# Patient Record
Sex: FEMALE | Race: WHITE | Employment: FULL TIME | URBAN - METROPOLITAN AREA
[De-identification: names, ages, dates, MRNs, and addresses within clinical notes are randomized per-mention and may not be internally consistent; named-entity substitution may affect disease eponyms.]

---
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
## 2021-06-08 ENCOUNTER — HOSPITAL ENCOUNTER (EMERGENCY)
Age: 21
Discharge: HOME | End: 2021-06-08
Attending: STUDENT IN AN ORGANIZED HEALTH CARE EDUCATION/TRAINING PROGRAM

## 2021-06-08 VITALS
SYSTOLIC BLOOD PRESSURE: 99 MMHG | OXYGEN SATURATION: 98 % | RESPIRATION RATE: 16 BRPM | WEIGHT: 165 LBS | HEIGHT: 69 IN | BODY MASS INDEX: 24.44 KG/M2 | HEART RATE: 70 BPM | TEMPERATURE: 98.4 F | DIASTOLIC BLOOD PRESSURE: 66 MMHG

## 2021-06-08 DIAGNOSIS — N76.0 BACTERIAL VAGINOSIS: Primary | ICD-10-CM

## 2021-06-08 DIAGNOSIS — B96.89 BACTERIAL VAGINOSIS: Primary | ICD-10-CM

## 2021-06-08 LAB
BILIRUBIN URINE: NEGATIVE
BLOOD, URINE: NEGATIVE
CLARITY: CLEAR
CLUE CELLS: ABNORMAL
COLOR: YELLOW
GLUCOSE URINE: NEGATIVE MG/DL
HCG, URINE, POC: NEGATIVE
KETONES, URINE: ABNORMAL MG/DL
LEUKOCYTE ESTERASE, URINE: NEGATIVE
Lab: NORMAL
NEGATIVE QC PASS/FAIL: NORMAL
NITRITE, URINE: NEGATIVE
PH UA: 6 (ref 5–9)
POSITIVE QC PASS/FAIL: NORMAL
PROTEIN UA: NEGATIVE MG/DL
SPECIFIC GRAVITY UA: 1.03 (ref 1–1.03)
TRICHOMONAS PREP: ABNORMAL
TRICHOMONAS VAGINALIS SCREEN: NEGATIVE
URINE REFLEX TO CULTURE: ABNORMAL
UROBILINOGEN, URINE: 1 E.U./DL
YEAST WET PREP: ABNORMAL

## 2021-06-08 PROCEDURE — 87591 N.GONORRHOEAE DNA AMP PROB: CPT

## 2021-06-08 PROCEDURE — 6370000000 HC RX 637 (ALT 250 FOR IP): Performed by: STUDENT IN AN ORGANIZED HEALTH CARE EDUCATION/TRAINING PROGRAM

## 2021-06-08 PROCEDURE — 87808 TRICHOMONAS ASSAY W/OPTIC: CPT

## 2021-06-08 PROCEDURE — 99284 EMERGENCY DEPT VISIT MOD MDM: CPT

## 2021-06-08 PROCEDURE — 81003 URINALYSIS AUTO W/O SCOPE: CPT

## 2021-06-08 PROCEDURE — 87491 CHLMYD TRACH DNA AMP PROBE: CPT

## 2021-06-08 PROCEDURE — 87210 SMEAR WET MOUNT SALINE/INK: CPT

## 2021-06-08 RX ORDER — NAPROXEN 500 MG/1
500 TABLET ORAL 2 TIMES DAILY
Qty: 20 TABLET | Refills: 0 | Status: SHIPPED | OUTPATIENT
Start: 2021-06-08 | End: 2021-06-18

## 2021-06-08 RX ORDER — DICYCLOMINE HYDROCHLORIDE 10 MG/1
10 CAPSULE ORAL EVERY 6 HOURS PRN
Qty: 12 CAPSULE | Refills: 0 | Status: SHIPPED | OUTPATIENT
Start: 2021-06-08 | End: 2021-06-11

## 2021-06-08 RX ORDER — METRONIDAZOLE 500 MG/1
2000 TABLET ORAL ONCE
Status: COMPLETED | OUTPATIENT
Start: 2021-06-08 | End: 2021-06-08

## 2021-06-08 RX ADMIN — METRONIDAZOLE 2000 MG: 500 TABLET ORAL at 11:50

## 2021-06-08 ASSESSMENT — ENCOUNTER SYMPTOMS
CHEST TIGHTNESS: 0
TROUBLE SWALLOWING: 0
SHORTNESS OF BREATH: 0
VOMITING: 0
BACK PAIN: 0
SINUS PRESSURE: 0
ABDOMINAL PAIN: 1
DIARRHEA: 0
COUGH: 0

## 2021-06-08 ASSESSMENT — PAIN SCALES - GENERAL: PAINLEVEL_OUTOF10: 6

## 2021-06-08 ASSESSMENT — PAIN DESCRIPTION - LOCATION: LOCATION: ABDOMEN

## 2021-06-08 NOTE — ED TRIAGE NOTES
Pt comes to er with  C/o lower  Abdominal pain x 4 days. Denies  N/v/d.  Pt states she  Has no pain with urination but  She is urinating less

## 2021-06-08 NOTE — ED PROVIDER NOTES
[address] [facility] ED  eMERGENCY dEPARTMENT eNCOUnter      Pt Name: Candelaria Membreno  MRN: [id number]  [place] 2000  Date of evaluation: 6/8/2021  Provider: Mar Gama, [address] [place] [address]       Chief Complaint   Patient presents with    Abdominal Pain     x 4 days. denies n/v/d         HISTORY OF PRESENT ILLNESS   (Location/Symptom, Timing/Onset,Context/Setting, Quality, Duration, Modifying Factors, Severity)  Note limiting factors. Candelaria Membreno is a 23 y.o. female who presents to the emergency department with c/o prepubic abdominal pain. Patient is worried that she may have a urinary tract infection. Patient denies any nausea, vomiting or diarrhea. Patient denies any vaginal discharge or vaginal bleeding. Patient's male fiancé accompanies the patient. I have her permission to interview, examined her, discussed her care with him present. The history is provided by the patient and a significant other. NursingNotes were reviewed. REVIEW OF SYSTEMS    (2-9 systems for level 4, 10 or more for level 5)     Review of Systems   Constitutional: Negative for activity change, appetite change, chills, fever and unexpected weight change. HENT: Negative for drooling, ear pain, nosebleeds, sinus pressure and trouble swallowing. Respiratory: Negative for cough, chest tightness and shortness of breath. Cardiovascular: Negative for chest pain and leg swelling. Gastrointestinal: Positive for abdominal pain. Negative for diarrhea and vomiting. Endocrine: Negative for polydipsia and polyphagia. Genitourinary: Negative for dysuria, flank pain and frequency. Musculoskeletal: Negative for back pain and myalgias. Skin: Negative for pallor and rash. Neurological: Negative for syncope, weakness and headaches. Hematological: Does not bruise/bleed easily. All other systems reviewed and are negative.       Except as noted above the remainder of the review of systems was reviewed and negative. PAST MEDICAL HISTORY   History reviewed. No pertinent past medical history. SURGICALHISTORY     History reviewed. No pertinent surgical history. CURRENT MEDICATIONS       Discharge Medication List as of 6/8/2021 11:22 AM          ALLERGIES     Patient has no known allergies. FAMILY HISTORY     History reviewed. No pertinent family history. SOCIAL HISTORY       Social History     Socioeconomic History    Marital status: Single     Spouse name: None    Number of children: None    Years of education: None    Highest education level: None   Occupational History    None   Tobacco Use    Smoking status: Never Smoker    Smokeless tobacco: Never Used   Substance and Sexual Activity    Alcohol use: No    Drug use: No    Sexual activity: None   Other Topics Concern    None   Social History Narrative    None     Social Determinants of Health     Financial Resource Strain:     Difficulty of Paying Living Expenses:    Food Insecurity:     Worried About Running Out of Food in the Last Year:     Ran Out of Food in the Last Year:    Transportation Needs:     Lack of Transportation (Medical):      Lack of Transportation (Non-Medical):    Physical Activity:     Days of Exercise per Week:     Minutes of Exercise per Session:    Stress:     Feeling of Stress :    Social Connections:     Frequency of Communication with Friends and Family:     Frequency of Social Gatherings with Friends and Family:     Attends Anabaptism Services:     Active Member of Clubs or Organizations:     Attends Club or Organization Meetings:     Marital Status:    Intimate Partner Violence:     Fear of Current or Ex-Partner:     Emotionally Abused:     Physically Abused:     Sexually Abused:        SCREENINGS    [place] Coma Scale  Eye Opening: Spontaneous  Best Verbal Response: Oriented  Best Motor Response: Obeys commands  Christofer Coma Scale Score: 15 @FLOW([id number])@      PHYSICAL EXAM    (up to 7 for level 4, 8 or more for level 5)     ED Triage Vitals [06/08/21 0818]   BP Temp Temp Source Pulse Resp SpO2 Height Weight   115/70 98.4 °F (36.9 °C) Oral 73 16 99 % 5' 9\" (1.753 m) 165 lb (74.8 kg)       Physical Exam  Vitals and nursing note reviewed. Exam conducted with a chaperone present. Constitutional:       General: She is awake. She is not in acute distress. Appearance: Normal appearance. She is well-developed and normal weight. She is not ill-appearing, toxic-appearing or diaphoretic. Comments: No photophobia. No phonophobia. HENT:      Head: Normocephalic and atraumatic. No Henriquez's sign. Right Ear: External ear normal.      Left Ear: External ear normal.      Nose: Nose normal. No congestion or rhinorrhea. Mouth/Throat:      Mouth: Mucous membranes are moist.      Pharynx: Oropharynx is clear. No oropharyngeal exudate or posterior oropharyngeal erythema. Eyes:      General: No scleral icterus. Right eye: No foreign body or discharge. Left eye: No discharge. Extraocular Movements: Extraocular movements intact. Conjunctiva/sclera: Conjunctivae normal.      Left eye: No exudate. Pupils: Pupils are equal, round, and reactive to light. Neck:      Vascular: No JVD. Trachea: No tracheal deviation. Comments: No meningismus. Cardiovascular:      Rate and Rhythm: Normal rate and regular rhythm. Heart sounds: Normal heart sounds. Heart sounds not distant. No murmur heard. No friction rub. No gallop. Pulmonary:      Effort: Pulmonary effort is normal. No respiratory distress. Breath sounds: Normal breath sounds. No stridor. No wheezing, rhonchi or rales. Chest:      Chest wall: No tenderness. Abdominal:      General: Abdomen is flat. Bowel sounds are normal. There is no distension or abdominal bruit. There are no signs of injury. Palpations: Abdomen is soft.  There is no shifting dullness, fluid wave, hepatomegaly, splenomegaly, mass or pulsatile mass. Tenderness: There is abdominal tenderness in the suprapubic area. There is no right CVA tenderness, left CVA tenderness, guarding or rebound. Negative signs include Rucker's sign, Rovsing's sign and McBurney's sign. Hernia: No hernia is present. Genitourinary:     Exam position: Knee-chest position. Vagina: Normal.      Cervix: Discharge present. No erythema or cervical bleeding. Uterus: Normal.       Adnexa: Right adnexa normal and left adnexa normal.   Musculoskeletal:         General: No swelling, tenderness, deformity or signs of injury. Normal range of motion. Cervical back: Normal range of motion and neck supple. No rigidity. Lymphadenopathy:      Head:      Right side of head: No submental adenopathy. Left side of head: No submental adenopathy. Skin:     General: Skin is warm and dry. Capillary Refill: Capillary refill takes less than 2 seconds. Coloration: Skin is not jaundiced or pale. Findings: No bruising, erythema, lesion or rash. Neurological:      General: No focal deficit present. Mental Status: She is alert and oriented to person, place, and time. Mental status is at baseline. Cranial Nerves: No cranial nerve deficit. Sensory: No sensory deficit. Motor: No weakness. Coordination: Coordination normal.      Deep Tendon Reflexes: Reflexes are normal and symmetric. Psychiatric:         Mood and Affect: Mood normal.         Behavior: Behavior normal. Behavior is cooperative. Thought Content:  Thought content normal.         Judgment: Judgment normal.         DIAGNOSTIC RESULTS     EKG: All EKG's are interpreted by the Emergency Department Physician who either signs or Co-signsthis chart in the absence of a cardiologist.        RADIOLOGY:   Non-plain filmimages such as CT, Ultrasound and MRI are read by the radiologist. Plain radiographic images are visualized and preliminarily interpreted by the emergency physician with the below findings:        Interpretation per the Radiologist below, if available at the time ofthis note:    No orders to display         ED BEDSIDE ULTRASOUND:   Performed by ED Physician - none    LABS:  Labs Reviewed   WET PREP, GENITAL - Abnormal; Notable for the following components:       Result Value    Clue Cells, Wet Prep 1+ (*)     All other components within normal limits   URINE RT REFLEX TO CULTURE - Abnormal; Notable for the following components:    Ketones, Urine TRACE (*)     All other components within normal limits   C.TRACHOMATIS N.GONORRHOEAE DNA, URINE   TRICHOMONAS BY EIA   POC PREGNANCY UR-QUAL       All other labs were within normal range or not returned as of this dictation. EMERGENCY DEPARTMENT COURSE and DIFFERENTIAL DIAGNOSIS/MDM:   Vitals:    Vitals:    06/08/21 0818 06/08/21 1154   BP: 115/70 99/66   Pulse: 73 70   Resp: 16 16   Temp: 98.4 °F (36.9 °C)    TempSrc: Oral    SpO2: 99% 98%   Weight: 165 lb (74.8 kg)    Height: 5' 9\" (1.753 m)            MDM  Patient has suprapubic abdominal pain. Urinalysis shows no urinary tract infection. Pelvic exam was done which shows vaginal discharge. Wet prep was positive for bacterial vaginosis. Patient states she follows regularly with OB/GYN. Patient was treated with 2 g of Flagyl. Patient informed that the Flagyl may neutralize her birth control and the need to use another protective method such as condoms in order to avoid pregnancy for the next 1 month. The patient verbalized understanding of care discussed with her she has no further questions. The findings were discussed with the patient. The patient was invited to return  to the ER if worse symptoms. The patient verbalized understanding of the care and they have no further questions. CONSULTS:  None    PROCEDURES:  Unless otherwise noted below, none     Procedures    FINAL IMPRESSION      1.  Bacterial vaginosis          DISPOSITION/PLAN   DISPOSITION Decision To Discharge 06/08/2021 12:03:32 PM      PATIENT REFERRED TO:  [facility]  [phone] [facility] [id number]  [phone]  Call in 1 day        DISCHARGE MEDICATIONS:  Discharge Medication List as of 6/8/2021 11:22 AM      START taking these medications    Details   naproxen (NAPROSYN) 500 MG tablet Take 1 tablet by mouth 2 times daily for 20 doses, Disp-20 tablet, R-0Print      dicyclomine (BENTYL) 10 MG capsule Take 1 capsule by mouth every 6 hours as needed (cramps), Disp-12 capsule, R-0Print                (Please note that portions of this note were completed with a voice recognition program.  Efforts were made to edit the dictations but occasionally words are mis-transcribed.)    Ирина Lopez DO (electronically signed)  Attending Emergency Physician          Ирина Lopez DO  06/08/21 1942

## 2021-06-11 LAB
C. TRACHOMATIS DNA ,URINE: NEGATIVE
N. GONORRHOEAE DNA, URINE: NEGATIVE

## 2025-01-15 ENCOUNTER — TRANSCRIBE ORDERS (OUTPATIENT)
Dept: ADMINISTRATIVE | Age: 25
End: 2025-01-15

## 2025-01-15 DIAGNOSIS — Z34.02 ENCOUNTER FOR SUPERVISION OF NORMAL FIRST PREGNANCY IN SECOND TRIMESTER: Primary | ICD-10-CM

## 2025-01-16 ENCOUNTER — HOSPITAL ENCOUNTER (OUTPATIENT)
Dept: ULTRASOUND IMAGING | Age: 25
Discharge: HOME | End: 2025-01-18
Payer: COMMERCIAL

## 2025-01-16 DIAGNOSIS — Z34.02 ENCOUNTER FOR SUPERVISION OF NORMAL FIRST PREGNANCY IN SECOND TRIMESTER: ICD-10-CM

## 2025-01-16 PROCEDURE — 76817 TRANSVAGINAL US OBSTETRIC: CPT

## 2025-01-16 PROCEDURE — 76805 OB US >/= 14 WKS SNGL FETUS: CPT

## 2025-01-22 ENCOUNTER — CLINICAL SUPPORT (OUTPATIENT)
Dept: GENETICS | Facility: CLINIC | Age: 25
End: 2025-01-22
Payer: COMMERCIAL

## 2025-01-22 VITALS
SYSTOLIC BLOOD PRESSURE: 123 MMHG | BODY MASS INDEX: 25.77 KG/M2 | DIASTOLIC BLOOD PRESSURE: 74 MMHG | HEIGHT: 69 IN | HEART RATE: 91 BPM | WEIGHT: 174 LBS

## 2025-01-22 DIAGNOSIS — O28.3 ABNORMAL FETAL ULTRASOUND: ICD-10-CM

## 2025-01-22 ASSESSMENT — PAIN SCALES - GENERAL: PAINLEVEL_OUTOF10: 0-NO PAIN

## 2025-01-24 ENCOUNTER — APPOINTMENT (OUTPATIENT)
Dept: GENETICS | Facility: CLINIC | Age: 25
End: 2025-01-24
Payer: COMMERCIAL

## 2025-01-24 ENCOUNTER — HOSPITAL ENCOUNTER (OUTPATIENT)
Dept: RADIOLOGY | Facility: HOSPITAL | Age: 25
Discharge: HOME | End: 2025-01-24
Payer: COMMERCIAL

## 2025-01-24 ENCOUNTER — HOSPITAL ENCOUNTER (OUTPATIENT)
Facility: HOSPITAL | Age: 25
Discharge: HOME | End: 2025-01-24
Attending: STUDENT IN AN ORGANIZED HEALTH CARE EDUCATION/TRAINING PROGRAM | Admitting: STUDENT IN AN ORGANIZED HEALTH CARE EDUCATION/TRAINING PROGRAM
Payer: COMMERCIAL

## 2025-01-24 ENCOUNTER — HOSPITAL ENCOUNTER (OUTPATIENT)
Facility: HOSPITAL | Age: 25
End: 2025-01-24
Attending: STUDENT IN AN ORGANIZED HEALTH CARE EDUCATION/TRAINING PROGRAM | Admitting: STUDENT IN AN ORGANIZED HEALTH CARE EDUCATION/TRAINING PROGRAM
Payer: COMMERCIAL

## 2025-01-24 VITALS
HEART RATE: 73 BPM | OXYGEN SATURATION: 98 % | DIASTOLIC BLOOD PRESSURE: 58 MMHG | TEMPERATURE: 97.3 F | SYSTOLIC BLOOD PRESSURE: 123 MMHG | RESPIRATION RATE: 18 BRPM

## 2025-01-24 DIAGNOSIS — O41.02X1: ICD-10-CM

## 2025-01-24 PROBLEM — O41.02X0: Status: ACTIVE | Noted: 2025-01-24

## 2025-01-24 LAB
ABO GROUP (TYPE) IN BLOOD: NORMAL
ALBUMIN SERPL BCP-MCNC: 3.6 G/DL (ref 3.4–5)
ALP SERPL-CCNC: 54 U/L (ref 33–110)
ALT SERPL W P-5'-P-CCNC: 12 U/L (ref 7–45)
ANION GAP SERPL CALC-SCNC: 13 MMOL/L (ref 10–20)
ANTIBODY SCREEN: NORMAL
AST SERPL W P-5'-P-CCNC: 16 U/L (ref 9–39)
BILIRUB SERPL-MCNC: 0.3 MG/DL (ref 0–1.2)
BUN SERPL-MCNC: 8 MG/DL (ref 6–23)
C TRACH RRNA SPEC QL NAA+PROBE: NEGATIVE
CALCIUM SERPL-MCNC: 8.5 MG/DL (ref 8.6–10.6)
CHLORIDE SERPL-SCNC: 106 MMOL/L (ref 98–107)
CLUE CELLS SPEC QL WET PREP: NORMAL
CO2 SERPL-SCNC: 22 MMOL/L (ref 21–32)
CREAT SERPL-MCNC: 0.45 MG/DL (ref 0.5–1.05)
EGFRCR SERPLBLD CKD-EPI 2021: >90 ML/MIN/1.73M*2
ERYTHROCYTE [DISTWIDTH] IN BLOOD BY AUTOMATED COUNT: 12.8 % (ref 11.5–14.5)
GLUCOSE SERPL-MCNC: 78 MG/DL (ref 74–99)
HBV SURFACE AG SERPL QL IA: NONREACTIVE
HCT VFR BLD AUTO: 32.5 % (ref 36–46)
HCV AB SER QL: NONREACTIVE
HGB BLD-MCNC: 11 G/DL (ref 12–16)
HIV 1+2 AB+HIV1 P24 AG SERPL QL IA: NONREACTIVE
MCH RBC QN AUTO: 30.3 PG (ref 26–34)
MCHC RBC AUTO-ENTMCNC: 33.8 G/DL (ref 32–36)
MCV RBC AUTO: 90 FL (ref 80–100)
N GONORRHOEA DNA SPEC QL PROBE+SIG AMP: NEGATIVE
NRBC BLD-RTO: 0 /100 WBCS (ref 0–0)
PLATELET # BLD AUTO: 196 X10*3/UL (ref 150–450)
POC APPEARANCE, URINE: CLEAR
POC BILIRUBIN, URINE: NEGATIVE
POC BLOOD, URINE: NEGATIVE
POC COLOR, URINE: YELLOW
POC GLUCOSE, URINE: NEGATIVE MG/DL
POC KETONES, URINE: NEGATIVE MG/DL
POC LEUKOCYTES, URINE: NEGATIVE
POC NITRITE,URINE: NEGATIVE
POC PH, URINE: 7 PH
POC PROTEIN, URINE: NEGATIVE MG/DL
POC SPECIFIC GRAVITY, URINE: 1.02
POC UROBILINOGEN, URINE: 0.2 EU/DL
POTASSIUM SERPL-SCNC: 3.9 MMOL/L (ref 3.5–5.3)
PROT SERPL-MCNC: 6.1 G/DL (ref 6.4–8.2)
RBC # BLD AUTO: 3.63 X10*6/UL (ref 4–5.2)
REFLEX ADDED, ANEMIA PANEL: NORMAL
RH FACTOR (ANTIGEN D): NORMAL
RUBV IGG SERPL IA-ACNC: 2.4 IA
RUBV IGG SERPL QL IA: POSITIVE
SODIUM SERPL-SCNC: 137 MMOL/L (ref 136–145)
T VAGINALIS RRNA SPEC QL NAA+PROBE: NEGATIVE
T VAGINALIS SPEC QL WET PREP: NORMAL
TREPONEMA PALLIDUM IGG+IGM AB [PRESENCE] IN SERUM OR PLASMA BY IMMUNOASSAY: NONREACTIVE
WBC # BLD AUTO: 10 X10*3/UL (ref 4.4–11.3)
WBC VAG QL WET PREP: >50
YEAST VAG QL WET PREP: NORMAL

## 2025-01-24 PROCEDURE — 87491 CHLMYD TRACH DNA AMP PROBE: CPT | Performed by: NURSE PRACTITIONER

## 2025-01-24 PROCEDURE — 76811 OB US DETAILED SNGL FETUS: CPT

## 2025-01-24 PROCEDURE — 87661 TRICHOMONAS VAGINALIS AMPLIF: CPT | Performed by: NURSE PRACTITIONER

## 2025-01-24 PROCEDURE — 36415 COLL VENOUS BLD VENIPUNCTURE: CPT

## 2025-01-24 PROCEDURE — 80053 COMPREHEN METABOLIC PANEL: CPT | Performed by: NURSE PRACTITIONER

## 2025-01-24 PROCEDURE — 85027 COMPLETE CBC AUTOMATED: CPT | Performed by: NURSE PRACTITIONER

## 2025-01-24 PROCEDURE — 87210 SMEAR WET MOUNT SALINE/INK: CPT | Performed by: NURSE PRACTITIONER

## 2025-01-24 PROCEDURE — 86803 HEPATITIS C AB TEST: CPT | Performed by: NURSE PRACTITIONER

## 2025-01-24 PROCEDURE — 87340 HEPATITIS B SURFACE AG IA: CPT | Performed by: NURSE PRACTITIONER

## 2025-01-24 PROCEDURE — 86780 TREPONEMA PALLIDUM: CPT | Performed by: NURSE PRACTITIONER

## 2025-01-24 PROCEDURE — 86901 BLOOD TYPING SEROLOGIC RH(D): CPT | Performed by: NURSE PRACTITIONER

## 2025-01-24 PROCEDURE — 86317 IMMUNOASSAY INFECTIOUS AGENT: CPT | Performed by: NURSE PRACTITIONER

## 2025-01-24 PROCEDURE — 99214 OFFICE O/P EST MOD 30 MIN: CPT | Mod: 25

## 2025-01-24 PROCEDURE — 87389 HIV-1 AG W/HIV-1&-2 AB AG IA: CPT | Performed by: NURSE PRACTITIONER

## 2025-01-24 PROCEDURE — 99213 OFFICE O/P EST LOW 20 MIN: CPT | Performed by: NURSE PRACTITIONER

## 2025-01-24 PROCEDURE — 36415 COLL VENOUS BLD VENIPUNCTURE: CPT | Performed by: NURSE PRACTITIONER

## 2025-01-24 PROCEDURE — 81002 URINALYSIS NONAUTO W/O SCOPE: CPT | Performed by: NURSE PRACTITIONER

## 2025-01-24 RX ORDER — VITAMIN A, VITAMIN C, VITAMIN D, VITAMIN E, THIAMINE, RIBOFLAVIN, NIACIN, VITAMIN B6, FOLIC ACID, VITAMIN B12, CALCIUM, IRON, ZINC, COPPER 4000; 120; 400; 22; 1.84; 3; 20; 10; 1; 12; 200; 27; 25; 2 [IU]/1; MG/1; [IU]/1; [IU]/1; MG/1; MG/1; MG/1; MG/1; MG/1; UG/1; MG/1; MG/1; MG/1; MG/1
TABLET ORAL
COMMUNITY
Start: 2024-12-31

## 2025-01-24 RX ORDER — ONDANSETRON HYDROCHLORIDE 2 MG/ML
4 INJECTION, SOLUTION INTRAVENOUS EVERY 6 HOURS PRN
Status: DISCONTINUED | OUTPATIENT
Start: 2025-01-24 | End: 2025-01-24 | Stop reason: HOSPADM

## 2025-01-24 RX ORDER — ONDANSETRON 4 MG/1
4 TABLET, FILM COATED ORAL EVERY 6 HOURS PRN
Status: DISCONTINUED | OUTPATIENT
Start: 2025-01-24 | End: 2025-01-24 | Stop reason: HOSPADM

## 2025-01-24 RX ORDER — LIDOCAINE HYDROCHLORIDE 10 MG/ML
0.5 INJECTION, SOLUTION INFILTRATION; PERINEURAL ONCE AS NEEDED
Status: DISCONTINUED | OUTPATIENT
Start: 2025-01-24 | End: 2025-01-24 | Stop reason: HOSPADM

## 2025-01-24 SDOH — SOCIAL STABILITY: SOCIAL INSECURITY: DO YOU FEEL ANYONE HAS EXPLOITED OR TAKEN ADVANTAGE OF YOU FINANCIALLY OR OF YOUR PERSONAL PROPERTY?: NO

## 2025-01-24 SDOH — HEALTH STABILITY: MENTAL HEALTH: SUICIDAL BEHAVIOR (LIFETIME): NO

## 2025-01-24 SDOH — ECONOMIC STABILITY: HOUSING INSECURITY: DO YOU FEEL UNSAFE GOING BACK TO THE PLACE WHERE YOU ARE LIVING?: NO

## 2025-01-24 SDOH — HEALTH STABILITY: MENTAL HEALTH: HAVE YOU USED ANY PRESCRIPTION DRUGS OTHER THAN PRESCRIBED IN THE PAST 12 MONTHS?: NO

## 2025-01-24 SDOH — HEALTH STABILITY: MENTAL HEALTH: HAVE YOU USED ANY SUBSTANCES (CANABIS, COCAINE, HEROIN, HALLUCINOGENS, INHALANTS, ETC.) IN THE PAST 12 MONTHS?: NO

## 2025-01-24 SDOH — HEALTH STABILITY: MENTAL HEALTH: NON-SPECIFIC ACTIVE SUICIDAL THOUGHTS (PAST 1 MONTH): NO

## 2025-01-24 SDOH — SOCIAL STABILITY: SOCIAL INSECURITY: DOES ANYONE TRY TO KEEP YOU FROM HAVING/CONTACTING OTHER FRIENDS OR DOING THINGS OUTSIDE YOUR HOME?: NO

## 2025-01-24 SDOH — SOCIAL STABILITY: SOCIAL INSECURITY: ARE YOU OR HAVE YOU BEEN THREATENED OR ABUSED PHYSICALLY, EMOTIONALLY, OR SEXUALLY BY ANYONE?: NO

## 2025-01-24 SDOH — SOCIAL STABILITY: SOCIAL INSECURITY: ARE THERE ANY APPARENT SIGNS OF INJURIES/BEHAVIORS THAT COULD BE RELATED TO ABUSE/NEGLECT?: NO

## 2025-01-24 SDOH — SOCIAL STABILITY: SOCIAL INSECURITY: HAVE YOU HAD THOUGHTS OF HARMING ANYONE ELSE?: NO

## 2025-01-24 SDOH — SOCIAL STABILITY: SOCIAL INSECURITY: VERBAL ABUSE: DENIES

## 2025-01-24 SDOH — SOCIAL STABILITY: SOCIAL INSECURITY: HAS ANYONE EVER THREATENED TO HURT YOUR FAMILY OR YOUR PETS?: NO

## 2025-01-24 SDOH — SOCIAL STABILITY: SOCIAL INSECURITY: PHYSICAL ABUSE: DENIES

## 2025-01-24 SDOH — HEALTH STABILITY: MENTAL HEALTH: WISH TO BE DEAD (PAST 1 MONTH): NO

## 2025-01-24 SDOH — HEALTH STABILITY: MENTAL HEALTH: WERE YOU ABLE TO COMPLETE ALL THE BEHAVIORAL HEALTH SCREENINGS?: YES

## 2025-01-24 SDOH — SOCIAL STABILITY: SOCIAL INSECURITY: ABUSE SCREEN: ADULT

## 2025-01-24 ASSESSMENT — PATIENT HEALTH QUESTIONNAIRE - PHQ9
1. LITTLE INTEREST OR PLEASURE IN DOING THINGS: NOT AT ALL
SUM OF ALL RESPONSES TO PHQ9 QUESTIONS 1 & 2: 0
2. FEELING DOWN, DEPRESSED OR HOPELESS: NOT AT ALL

## 2025-01-24 ASSESSMENT — PAIN SCALES - GENERAL
PAINLEVEL_OUTOF10: 0 - NO PAIN
PAINLEVEL_OUTOF10: 0 - NO PAIN

## 2025-01-24 ASSESSMENT — LIFESTYLE VARIABLES
AUDIT-C TOTAL SCORE: 0
SKIP TO QUESTIONS 9-10: 1
HOW MANY STANDARD DRINKS CONTAINING ALCOHOL DO YOU HAVE ON A TYPICAL DAY: PATIENT DOES NOT DRINK
HOW OFTEN DO YOU HAVE A DRINK CONTAINING ALCOHOL: NEVER
HOW OFTEN DO YOU HAVE 6 OR MORE DRINKS ON ONE OCCASION: NEVER
AUDIT-C TOTAL SCORE: 0

## 2025-01-24 NOTE — H&P
Triage H&P    Tatianna Mojica is a 24 y.o. year old at Unknown by 19.3 wk US, who presents to triage for:   Chief Complaint   Patient presents with    From [facility] 1200 - r/o PPROM        Assessment/Plan:    Oligohydramnios  - JIM 4 cm today  - SSE neg pooling, neg nitrazine, neg ferning; mod thin gray discharge, + odor  - SVE closed/long  - GC/CT/trich, wet prep sent, will notify if abnormal  - prenatal labs sent  - low concern for PTL/PPROM at this time  - discussed PTL, FMCs, warning signs, when to return for eval, pt verb understanding    Maternal Well Being  - No acute distress  - Vitals stable and WNL  - urine dip neg    Fetal Well-Being  -   - good FM    Dispo  - Safe and stable for d/c to home  - Follow-up with OB provider as scheduled. Pt to re-schedule genetics appt within one week.  Office to call pt to schedule follow up ultrasound in 1-2 wks.     Staffed with Dr. Aviles-Eron Royal, MSN, APRN, WHNP-BC    Medical Problems       Subjective   Tatianna Mojica is a 24 y.o. year old at Unknown who presents to triage from [facility] 1200 with noted oligohydramnios on ultrasound. Pt reports two separate episodes of feeling a trickle of fluid after urinating, but no gush or continued leaking of fluid. Denies vaginal bleeding, cramping, vaginal itching, irritation, odor, concern for STIs. Last intercourse 3 days ago. Reports + fluttering.      OB History          1    Para        Term                AB        Living             SAB        IAB        Ectopic        Multiple        Live Births                      No past surgical history on file.     Social History     Socioeconomic History    Marital status: Single     Spouse name: Not on file    Number of children: Not on file    Years of education: Not on file    Highest education level: Not on file   Occupational History    Not on file   Tobacco Use    Smoking status: Not on file    Smokeless tobacco: Not on file   Substance and Sexual  Activity    Alcohol use: Not on file    Drug use: Not on file    Sexual activity: Not on file   Other Topics Concern    Not on file   Social History Narrative    Not on file     Social Drivers of Health     Financial Resource Strain: Not on file   Food Insecurity: Not on file   Transportation Needs: Not on file   Physical Activity: Not on file   Stress: Not on file   Social Connections: Not on file   Intimate Partner Violence: Not on file        No Known Allergies     Medications Prior to Admission   Medication Sig Dispense Refill Last Dose/Taking    M- Plus 27 mg iron- 1 mg tablet    2025        Objective     Visit Vitals  /62   Pulse 73   Temp 36.7 °C (98.1 °F) (Temporal)   Resp 18      Physical Exam  Chaperone Present: Yes.  Chaperone Name/Title: Avelina Garcia RN  Examination Chaperoned: Gynecological Exam     Physical Exam  Constitutional:       Appearance: Normal appearance.   HENT:      Head: Normocephalic and atraumatic.      Mouth/Throat:      Mouth: Mucous membranes are dry.   Cardiovascular:      Rate and Rhythm: Normal rate.   Pulmonary:      Effort: Pulmonary effort is normal.   Abdominal:      Palpations: Abdomen is soft.      Tenderness: There is no abdominal tenderness.   Genitourinary:     Comments: SSE neg pooling, neg nitrazine, neg ferning; mod thin gray discharge, + odor  SVE closed/long    [place] 140  Musculoskeletal:         General: Normal range of motion.      Cervical back: Normal range of motion.   Skin:     General: Skin is warm and dry.   Neurological:      Mental Status: She is alert and oriented to person, place, and time.   Psychiatric:         Behavior: Behavior normal.        Labs  Labs in chart were reviewed.  Admission on 2025   Component Date Value Ref Range Status    POC Color, Urine 2025 Yellow  Straw, Yellow, Light-Yellow Final    POC Appearance, Urine 2025 Clear  Clear Final    POC Glucose, Urine 2025 NEGATIVE  NEGATIVE mg/dl Final     POC Bilirubin, Urine 01/24/2025 NEGATIVE  NEGATIVE Final    POC Ketones, Urine 01/24/2025 NEGATIVE  NEGATIVE mg/dl Final    POC Specific Gravity, Urine 01/24/2025 1.020  1.005 - 1.035 Final    POC Blood, Urine 01/24/2025 NEGATIVE  NEGATIVE Final    POC PH, Urine 01/24/2025 7.0  No Reference Range Established PH Final    POC Protein, Urine 01/24/2025 NEGATIVE  NEGATIVE mg/dl Final    POC Urobilinogen, Urine 01/24/2025 0.2  0.2, 1.0 EU/DL Final    Poc Nitrite, Urine 01/24/2025 NEGATIVE  NEGATIVE Final    POC Leukocytes, Urine 01/24/2025 NEGATIVE  NEGATIVE Final    WBC 01/24/2025 10.0  4.4 - 11.3 x10*3/uL Final    nRBC 01/24/2025 0.0  0.0 - 0.0 /100 WBCs Final    RBC 01/24/2025 3.63 (L)  4.00 - 5.20 x10*6/uL Final    Hemoglobin 01/24/2025 11.0 (L)  12.0 - 16.0 g/dL Final    Hematocrit 01/24/2025 32.5 (L)  36.0 - 46.0 % Final    MCV 01/24/2025 90  80 - 100 fL Final    MCH 01/24/2025 30.3  26.0 - 34.0 pg Final    MCHC 01/24/2025 33.8  32.0 - 36.0 g/dL Final    RDW 01/24/2025 12.8  11.5 - 14.5 % Final    Platelets 01/24/2025 196  150 - 450 x10*3/uL Final

## 2025-01-27 ENCOUNTER — TELEMEDICINE CLINICAL SUPPORT (OUTPATIENT)
Dept: GENETICS | Facility: CLINIC | Age: 25
End: 2025-01-27
Payer: COMMERCIAL

## 2025-01-27 DIAGNOSIS — O41.00X0: ICD-10-CM

## 2025-01-27 PROCEDURE — 96041 GENETIC COUNSELING SVC EA 30: CPT | Performed by: GENETIC COUNSELOR, MS

## 2025-01-28 DIAGNOSIS — O41.02X0: Primary | ICD-10-CM

## 2025-01-28 NOTE — PROGRESS NOTES
"Thank you for the referral of Tatianna Mojica.  She is a 24 year old, , female who was 21 3/7 weeks pregnant at the time of our appointment with an EDC of 2025.  She was seen for genetic counseling with her partner, Ignacio, due to oligohydramnios. Consultation provided virtually.         PAST HISTORY:  Patient had ultrasound at outside institution at 11 6/7 weeks gestation and at 19 6/7 weeks gestation. Due to concern for decreased amniotic fluid on her prior ultrasound at 19 6/7 weeks gestation (JIM 3.05cm) she was referred to [facility] for further evaluation. Ultrasound performed in our institution at 21 weeks gestation was incomplete in regard to fetal anatomy assessment, and also demonstrated oligohydramnios. Fluid was see in the stomach and bladder, kidneys visualized, with no obvious fetal anomalies noted. Evaluation for premature rupture of membranes was negative, and genetic counseling was recommended. Patient believes she has a maternal Quad screen pending through her provider's office.     FAMILY HISTORY  Medical and family histories were reviewed and the following concerns were reported:    Patient   -born prematurely (3#; uncertain gestation), heart murmur, episodes as an infant where here \"heart would stop\" and she would turn \"Blue\", sent home from hospital with a \"monitor\"  -maternal half brother has a son who had a section of his intestines removed after birth  -mother  at 44 from complications following an exploratory surgery and sepsis; history of drug use  -maternal aunt had a son who was \"Delivered early due to low fluid\"; he has a daughter with autism       Patient's reproductive partner  -Age 21; very limited family history known  -one paternal half brother with autism    The remainder of the family history was negative for birth defects, intellectual disability, recurrent pregnancy loss, or recognized inherited conditions.  Consanguinity denied.          SELF " REPORTED RACE/ANCESTRY:  Patient:  (Faroese, Tajik)  Patient's partner: Black/     COUNSELING:    The following information was discussed with your patient:    1. Oligohydramnios is associated with many conditions/complications of pregnancy and can be characterized as being associated with maternal, fetal, placental, and idiopathic factors. Maternal associations include chronic hypertension, vascular disease, thrombophilia, preeclampsia, diabetes and use of certain drugs (angiotensin-converting enzyme inhibitors, NSAIDs, and cocaine). Fetal assocoations include premature rupture of membranes (37% of cases diagnosed in the second and third trimesters), genitourinary tract abnormalities (renal agenesis, obstructive nephropathy), fetal growth restriction (5% of the second trimester and 20.5% of third-trimester diagnoses), chromosomal abnormalities (10% of second trimester cases), and fetal demise. Placental causes of oligohydramnios include abruption (8.6% of all oligohydramnios cases) and twin-twin transfusion syndrome. The majority of oligohydramnios cases, 50.7% diagnosed in the third trimester, are of unexplained etiology and are typically associated with better outcomes.    2. At this time, premature rupture of membranes and urinary tract abnormality appear to be less likely causes of the oligohydramnios; however these possibilities cannot entirely be ruled out at this time. We discussed that genetic screening/testing may be pursued to evaluate the pregnancy for possible genetic associations (chromosomal and/or single gene disorders) of low amniotic fluid. Frequent follow up ultrasounds will likely be recommended to monitor fetal growth and amniotic fluid volume.     3. We discussed that the Quad screen (which patient reports is pending) is a hormone based screening test designed to assess risk for the pregnancy to have two specific genetic conditions, Down syndrome and Trisomy 18, as well  as neural tube defects. There is more comprehensive, sensitive and specific chromosomal testing that is available via maternal cell free DNA screening for chromosome conditions.     4. The availability, benefits and limitations of non-invasive prenatal screening.  We discussed the methodology for this testing, which includes using cell-free DNA obtained from a mother's blood to screen for the presence of common chromosomal abnormalities (trisomies 21, 13, 18, and sex chromosome aneuploidies). Depending on the laboratory used, there is a >99% sensitivity for Down syndrome, at least 97.4% sensitivity for trisomy 18, and at least 87.5% sensitivity for trisomy 13.  Specificity for these trisomies is >99%. Although sensitivity and specificity rates are high, not all positive results are confirmed to be true positives. False negative results are rare. In addition, anticoagulants, maternal chromosome abnormality, fibroids or malignancy may impact results and/or be associated with inconclusive or other abnormal findings. Therefore, in the event of an abnormal result, prenatal diagnosis through amniocentesis should be considered to confirm the findings.  Results take approximately 7-10 days.      5. The availability, benefits, and limitations of the MaterniT GENOME non invasive prenatal test. This test also utilizes cell free DNA obtained from a pregnant woman's blood to screen for any chromosome abnormality, including deletions and duplications, that are >/= 7 Mb in size, with at least 95.9% sensitivity and 99.9% specificity. It also includes screening for select microdeletions including 22q11 deletion (associated with DiGeorge syndrome), 15q11 deletion (associated with Prader-Willi/Angelman syndromes), 11q23 deletion (associated with Yovani syndrome), 8q24 deletion (associated with Camilo-Giedion syndrome), 5p15 deletion (associated with Cri-du-Chat syndrome), 4p16 deletion (associated with Daley-Hirschhorn syndrome), and  1p36 deletion (associated with 1p36 deletion syndrome). This is the most comprehensive fetal chromosomal test currently clinically available noninvasively. As with standard cfDNA, false positives and false negatives are possible. As there is currently no published data regarding positive predictive value of the test, prenatal diagnosis through amniocentesis should be considered to confirm any abnormal findings.     6. The methods, benefits, and limitations of the Vistara non invasive prenatal testing. We discussed that this test utilizes cell free DNA obtained from a pregnant woman's blood to screen 30 genes (testing for approximately 25 genetic conditions) that are associated with a high new mutation rate.  Common indications for this screening may included advanced paternal age and/or ultrasound abnormalities potentially suggestive of one of the disorders included in this screening panel.     7. The availability of diagnostic fetal genetic testing via amniocentesis. The methods, benefits, limitations and risks of amniocentesis and a 1 in 400 risk of complications, including a 1 in 800 risk of miscarriage.    8. If prenatal diagnostic testing is not elected, we also discussed the option of expanded carrier screening in the context of trying to identify an etiology for the abnormal ultrasound. Current expanded carrier screening can test the couple for up to 600 primarily autosomal recessive, conditions; although many of these conditions may not be associated with the current ultrasound findings. We discussed the pros and cons of expanded carrier screening including the higher likelihood of being identified as a carrier for at least one condition on a larger panel. Approximately 4% of couples are found to be at risk to have a child with a genetic disorder based on this screening. In rare cases, expanded carrier screening results may have health implications for the tested individual.      9. Additional family history  risk assessment:   Due to family history of autism we discussed that most cases of autism are thought to be associated with multifactorial (combination of genetic and environmental) causes; however up to 30-40% of cases of autism may be associated with a specific genetic etiology. Genetic causes are more commonly identified in individuals with more severe cases of autism in which a component of intellectual disability may be present. Genetic evaluation may be considered for the affected individuals to determine if a genetic etiology may be identified which would assist with recurrence risk estimation for this family.                  DISPOSITION:  The patient stated that she understood the above information and elected to proceed with:  -cell free DNA screening via MaterniTGenome screen; to be drawn at [facility] on Wed 11/29 at 11am  -patient declines other non invasive screening options (Vistara screen, carrier screening) which may have a lower yield based on the ultrasound finding  -patient declines invasive diagnostic testing  -follow up ultrasound and MFM consultation recommended in ~2 weeks            Total time Chanel Kirk MS, [facility] spent on day of encounter: 55 minutes (50 minutes with patient, 5 minutes on pre/post patient care activities, including documentation).    Thank you for allowing us to participate in the care of your patient.  Should you or your patient have any questions, please do not hesitate to contact our office at [phone].      Sincerely,      Chanel Kirk MS  Licensed Genetic Counselor

## 2025-01-29 ENCOUNTER — APPOINTMENT (OUTPATIENT)
Dept: MATERNAL FETAL MEDICINE | Facility: CLINIC | Age: 25
End: 2025-01-29
Payer: COMMERCIAL

## 2025-01-29 NOTE — PROGRESS NOTES
Per the Physician at [facility] (Dr. Carolina Bentley), the patient needed an ultrasound prior to genetic counseling.  The patient was provided the option of having partial genetic counseling today (for family history risk assessment) with completion of genetic counseling following her ultrasound (for discussion of genetic screening and diagnostic testing options).  The patient declined genetic counseling today and elected to have full genetic counseling following her ultrasound on 1/24/25.    Poornima Suarez MS  Licensed Genetic Counselor    Reviewed by:

## 2025-01-30 ENCOUNTER — APPOINTMENT (OUTPATIENT)
Dept: MATERNAL FETAL MEDICINE | Facility: CLINIC | Age: 25
End: 2025-01-30
Payer: COMMERCIAL

## 2025-02-04 ENCOUNTER — APPOINTMENT (OUTPATIENT)
Dept: MATERNAL FETAL MEDICINE | Facility: CLINIC | Age: 25
End: 2025-02-04
Payer: COMMERCIAL

## 2025-02-04 ENCOUNTER — HOSPITAL ENCOUNTER (OUTPATIENT)
Age: 25
Discharge: HOME | End: 2025-02-05
Attending: OBSTETRICS & GYNECOLOGY | Admitting: OBSTETRICS & GYNECOLOGY
Payer: COMMERCIAL

## 2025-02-04 PROBLEM — O42.119 PRETERM PREMATURE RUPTURE OF MEMBRANES WITH ONSET OF LABOR MORE THAN 24 HOURS FOLLOWING RUPTURE: Status: ACTIVE | Noted: 2025-02-04

## 2025-02-04 LAB
AMPHET UR QL SCN: ABNORMAL
BARBITURATES UR QL SCN: ABNORMAL
BENZODIAZ UR QL SCN: ABNORMAL
BILIRUB UR QL STRIP: NEGATIVE
CANNABINOIDS UR QL SCN: POSITIVE
CLARITY UR: ABNORMAL
COCAINE UR QL SCN: ABNORMAL
COLOR UR: YELLOW
DRUG SCREEN COMMENT UR-IMP: ABNORMAL
FENTANYL SCREEN, URINE: ABNORMAL
GLUCOSE UR STRIP-MCNC: NEGATIVE MG/DL
HGB UR QL STRIP: NEGATIVE
KETONES UR STRIP-MCNC: NEGATIVE MG/DL
LEUKOCYTE ESTERASE UR QL STRIP: NEGATIVE
METHADONE UR QL SCN: ABNORMAL
NITRITE UR QL STRIP: NEGATIVE
OPIATES UR QL SCN: ABNORMAL
OXYCODONE UR QL SCN: ABNORMAL
PCP UR QL SCN: ABNORMAL
PH UR STRIP: 7 [PH] (ref 5–9)
PLACENTA ALPHA MICROGLOBULIN-1: POSITIVE
PROPOXYPH UR QL SCN: ABNORMAL
PROT UR STRIP-MCNC: NEGATIVE MG/DL
SP GR UR STRIP: 1.02 (ref 1–1.03)
URINE REFLEX TO CULTURE: ABNORMAL
UROBILINOGEN UR STRIP-ACNC: 0.2 E.U./DL

## 2025-02-04 PROCEDURE — 84112 EVAL AMNIOTIC FLUID PROTEIN: CPT

## 2025-02-04 PROCEDURE — 80307 DRUG TEST PRSMV CHEM ANLYZR: CPT

## 2025-02-04 PROCEDURE — 59025 FETAL NON-STRESS TEST: CPT | Performed by: OBSTETRICS & GYNECOLOGY

## 2025-02-04 PROCEDURE — 99283 EMERGENCY DEPT VISIT LOW MDM: CPT | Performed by: OBSTETRICS & GYNECOLOGY

## 2025-02-04 PROCEDURE — 81003 URINALYSIS AUTO W/O SCOPE: CPT

## 2025-02-05 ENCOUNTER — HOSPITAL ENCOUNTER (INPATIENT)
Facility: HOSPITAL | Age: 25
End: 2025-02-05
Attending: STUDENT IN AN ORGANIZED HEALTH CARE EDUCATION/TRAINING PROGRAM | Admitting: OBSTETRICS & GYNECOLOGY
Payer: COMMERCIAL

## 2025-02-05 ENCOUNTER — APPOINTMENT (OUTPATIENT)
Dept: RADIOLOGY | Facility: HOSPITAL | Age: 25
End: 2025-02-05
Payer: COMMERCIAL

## 2025-02-05 VITALS
TEMPERATURE: 98.3 F | OXYGEN SATURATION: 99 % | BODY MASS INDEX: 26.52 KG/M2 | HEIGHT: 68 IN | WEIGHT: 175 LBS | SYSTOLIC BLOOD PRESSURE: 134 MMHG | HEART RATE: 87 BPM | RESPIRATION RATE: 16 BRPM | DIASTOLIC BLOOD PRESSURE: 81 MMHG

## 2025-02-05 DIAGNOSIS — D62 ABLA (ACUTE BLOOD LOSS ANEMIA): ICD-10-CM

## 2025-02-05 PROBLEM — O41.02X0: Status: ACTIVE | Noted: 2025-02-05

## 2025-02-05 LAB
ABO GROUP (TYPE) IN BLOOD: NORMAL
ANTIBODY SCREEN: NORMAL
BILIRUBIN, POC: NEGATIVE
BLOOD URINE, POC: NEGATIVE
C TRACH RRNA SPEC QL NAA+PROBE: NEGATIVE
CLARITY, POC: CLEAR
CLUE CELLS SPEC QL WET PREP: NORMAL
COLOR, POC: YELLOW
GLUCOSE URINE, POC: NEGATIVE
KETONES, POC: NEGATIVE
LEUKOCYTE EST, POC: NEGATIVE
N GONORRHOEA DNA SPEC QL PROBE+SIG AMP: NEGATIVE
NITRITE, POC: NEGATIVE
PH, POC: 7.5
POC APPEARANCE OF BODY FLUID: NORMAL
RH FACTOR (ANTIGEN D): NORMAL
SPECIFIC GRAVITY, POC: 1.02
T VAGINALIS SPEC QL WET PREP: NORMAL
URINE PROTEIN, POC: NEGATIVE
UROBILINOGEN, POC: 0.2
WBC VAG QL WET PREP: NORMAL
YEAST VAG QL WET PREP: NORMAL

## 2025-02-05 PROCEDURE — 76816 OB US FOLLOW-UP PER FETUS: CPT

## 2025-02-05 PROCEDURE — 36415 COLL VENOUS BLD VENIPUNCTURE: CPT

## 2025-02-05 PROCEDURE — 99254 IP/OBS CNSLTJ NEW/EST MOD 60: CPT | Performed by: PEDIATRICS

## 2025-02-05 PROCEDURE — 76816 OB US FOLLOW-UP PER FETUS: CPT | Performed by: OBSTETRICS & GYNECOLOGY

## 2025-02-05 PROCEDURE — 99223 1ST HOSP IP/OBS HIGH 75: CPT

## 2025-02-05 PROCEDURE — 87210 SMEAR WET MOUNT SALINE/INK: CPT

## 2025-02-05 PROCEDURE — 87591 N.GONORRHOEAE DNA AMP PROB: CPT | Performed by: STUDENT IN AN ORGANIZED HEALTH CARE EDUCATION/TRAINING PROGRAM

## 2025-02-05 PROCEDURE — 99285 EMERGENCY DEPT VISIT HI MDM: CPT

## 2025-02-05 PROCEDURE — 99222 1ST HOSP IP/OBS MODERATE 55: CPT

## 2025-02-05 PROCEDURE — 99214 OFFICE O/P EST MOD 30 MIN: CPT

## 2025-02-05 PROCEDURE — 2500000004 HC RX 250 GENERAL PHARMACY W/ HCPCS (ALT 636 FOR OP/ED)

## 2025-02-05 PROCEDURE — 86901 BLOOD TYPING SEROLOGIC RH(D): CPT

## 2025-02-05 PROCEDURE — 2500000001 HC RX 250 WO HCPCS SELF ADMINISTERED DRUGS (ALT 637 FOR MEDICARE OP)

## 2025-02-05 PROCEDURE — 87081 CULTURE SCREEN ONLY: CPT

## 2025-02-05 PROCEDURE — 1220000001 HC OB SEMI-PRIVATE ROOM DAILY

## 2025-02-05 RX ORDER — NIFEDIPINE 10 MG/1
10 CAPSULE ORAL ONCE AS NEEDED
Status: DISCONTINUED | OUTPATIENT
Start: 2025-02-05 | End: 2025-04-23

## 2025-02-05 RX ORDER — POLYETHYLENE GLYCOL 3350 17 G/17G
17 POWDER, FOR SOLUTION ORAL 2 TIMES DAILY PRN
Status: DISCONTINUED | OUTPATIENT
Start: 2025-02-05 | End: 2025-04-23

## 2025-02-05 RX ORDER — HYDRALAZINE HYDROCHLORIDE 20 MG/ML
5 INJECTION INTRAMUSCULAR; INTRAVENOUS ONCE AS NEEDED
Status: DISCONTINUED | OUTPATIENT
Start: 2025-02-05 | End: 2025-04-23

## 2025-02-05 RX ORDER — SIMETHICONE 80 MG
80 TABLET,CHEWABLE ORAL 4 TIMES DAILY PRN
Status: DISCONTINUED | OUTPATIENT
Start: 2025-02-05 | End: 2025-04-23

## 2025-02-05 RX ORDER — ADHESIVE BANDAGE
10 BANDAGE TOPICAL
Status: DISCONTINUED | OUTPATIENT
Start: 2025-02-05 | End: 2025-04-23

## 2025-02-05 RX ORDER — BETAMETHASONE SODIUM PHOSPHATE AND BETAMETHASONE ACETATE 3; 3 MG/ML; MG/ML
12 INJECTION, SUSPENSION INTRA-ARTICULAR; INTRALESIONAL; INTRAMUSCULAR; SOFT TISSUE EVERY 24 HOURS
Status: COMPLETED | OUTPATIENT
Start: 2025-02-05 | End: 2025-02-06

## 2025-02-05 RX ORDER — AZITHROMYCIN 500 MG/1
1000 TABLET, FILM COATED ORAL ONCE
Status: COMPLETED | OUTPATIENT
Start: 2025-02-05 | End: 2025-02-05

## 2025-02-05 RX ORDER — SODIUM CHLORIDE, SODIUM LACTATE, POTASSIUM CHLORIDE, CALCIUM CHLORIDE 600; 310; 30; 20 MG/100ML; MG/100ML; MG/100ML; MG/100ML
75 INJECTION, SOLUTION INTRAVENOUS CONTINUOUS
Status: ACTIVE | OUTPATIENT
Start: 2025-02-05 | End: 2025-02-06

## 2025-02-05 RX ORDER — ONDANSETRON HYDROCHLORIDE 2 MG/ML
4 INJECTION, SOLUTION INTRAVENOUS EVERY 6 HOURS PRN
Status: DISCONTINUED | OUTPATIENT
Start: 2025-02-05 | End: 2025-04-23

## 2025-02-05 RX ORDER — LABETALOL HYDROCHLORIDE 5 MG/ML
20 INJECTION, SOLUTION INTRAVENOUS ONCE AS NEEDED
Status: DISCONTINUED | OUTPATIENT
Start: 2025-02-05 | End: 2025-04-23

## 2025-02-05 RX ORDER — AMOXICILLIN 500 MG/1
500 CAPSULE ORAL EVERY 8 HOURS
Status: COMPLETED | OUTPATIENT
Start: 2025-02-07 | End: 2025-02-12

## 2025-02-05 RX ORDER — ONDANSETRON 4 MG/1
4 TABLET, FILM COATED ORAL EVERY 6 HOURS PRN
Status: DISCONTINUED | OUTPATIENT
Start: 2025-02-05 | End: 2025-04-23

## 2025-02-05 RX ORDER — LIDOCAINE HYDROCHLORIDE 10 MG/ML
0.5 INJECTION, SOLUTION INFILTRATION; PERINEURAL ONCE AS NEEDED
Status: DISCONTINUED | OUTPATIENT
Start: 2025-02-05 | End: 2025-04-23

## 2025-02-05 RX ADMIN — PRENATAL VIT W/ FE FUMARATE-FA TAB 27-0.8 MG 1 TABLET: 27-0.8 TAB at 10:44

## 2025-02-05 RX ADMIN — AZITHROMYCIN DIHYDRATE 1000 MG: 500 TABLET ORAL at 16:37

## 2025-02-05 RX ADMIN — AMPICILLIN SODIUM 2 G: 2 INJECTION, POWDER, FOR SOLUTION INTRAMUSCULAR; INTRAVENOUS at 16:38

## 2025-02-05 RX ADMIN — AMPICILLIN SODIUM 2 G: 2 INJECTION, POWDER, FOR SOLUTION INTRAMUSCULAR; INTRAVENOUS at 22:29

## 2025-02-05 RX ADMIN — BETAMETHASONE SODIUM PHOSPHATE AND BETAMETHASONE ACETATE 12 MG: 3; 3 INJECTION, SUSPENSION INTRA-ARTICULAR; INTRALESIONAL; INTRAMUSCULAR at 16:38

## 2025-02-05 SDOH — SOCIAL STABILITY: SOCIAL INSECURITY: WITHIN THE LAST YEAR, HAVE YOU BEEN AFRAID OF YOUR PARTNER OR EX-PARTNER?: NO

## 2025-02-05 SDOH — SOCIAL STABILITY: SOCIAL INSECURITY: DOES ANYONE TRY TO KEEP YOU FROM HAVING/CONTACTING OTHER FRIENDS OR DOING THINGS OUTSIDE YOUR HOME?: NO

## 2025-02-05 SDOH — HEALTH STABILITY: MENTAL HEALTH: SUICIDAL BEHAVIOR (LIFETIME): NO

## 2025-02-05 SDOH — SOCIAL STABILITY: SOCIAL INSECURITY: WITHIN THE LAST YEAR, HAVE YOU BEEN HUMILIATED OR EMOTIONALLY ABUSED IN OTHER WAYS BY YOUR PARTNER OR EX-PARTNER?: NO

## 2025-02-05 SDOH — SOCIAL STABILITY: SOCIAL INSECURITY
WITHIN THE LAST YEAR, HAVE YOU BEEN KICKED, HIT, SLAPPED, OR OTHERWISE PHYSICALLY HURT BY YOUR PARTNER OR EX-PARTNER?: NO

## 2025-02-05 SDOH — SOCIAL STABILITY: SOCIAL INSECURITY
WITHIN THE LAST YEAR, HAVE YOU BEEN RAPED OR FORCED TO HAVE ANY KIND OF SEXUAL ACTIVITY BY YOUR PARTNER OR EX-PARTNER?: NO

## 2025-02-05 SDOH — SOCIAL STABILITY: SOCIAL INSECURITY: HAVE YOU HAD THOUGHTS OF HARMING ANYONE ELSE?: NO

## 2025-02-05 SDOH — SOCIAL STABILITY: SOCIAL INSECURITY: DO YOU FEEL ANYONE HAS EXPLOITED OR TAKEN ADVANTAGE OF YOU FINANCIALLY OR OF YOUR PERSONAL PROPERTY?: NO

## 2025-02-05 SDOH — SOCIAL STABILITY: SOCIAL INSECURITY: ARE YOU OR HAVE YOU BEEN THREATENED OR ABUSED PHYSICALLY, EMOTIONALLY, OR SEXUALLY BY ANYONE?: NO

## 2025-02-05 SDOH — ECONOMIC STABILITY: FOOD INSECURITY
WITHIN THE PAST 12 MONTHS, YOU WORRIED THAT YOUR FOOD WOULD RUN OUT BEFORE YOU GOT THE MONEY TO BUY MORE.: SOMETIMES TRUE

## 2025-02-05 SDOH — SOCIAL STABILITY: SOCIAL INSECURITY: ARE THERE ANY APPARENT SIGNS OF INJURIES/BEHAVIORS THAT COULD BE RELATED TO ABUSE/NEGLECT?: NO

## 2025-02-05 SDOH — HEALTH STABILITY: MENTAL HEALTH: WISH TO BE DEAD (PAST 1 MONTH): NO

## 2025-02-05 SDOH — SOCIAL STABILITY: SOCIAL INSECURITY: ABUSE SCREEN: ADULT

## 2025-02-05 SDOH — SOCIAL STABILITY: SOCIAL INSECURITY: HAS ANYONE EVER THREATENED TO HURT YOUR FAMILY OR YOUR PETS?: NO

## 2025-02-05 SDOH — HEALTH STABILITY: MENTAL HEALTH: NON-SPECIFIC ACTIVE SUICIDAL THOUGHTS (PAST 1 MONTH): NO

## 2025-02-05 SDOH — ECONOMIC STABILITY: FOOD INSECURITY: WITHIN THE PAST 12 MONTHS, THE FOOD YOU BOUGHT JUST DIDN'T LAST AND YOU DIDN'T HAVE MONEY TO GET MORE.: NEVER TRUE

## 2025-02-05 SDOH — ECONOMIC STABILITY: TRANSPORTATION INSECURITY: IN THE PAST 12 MONTHS, HAS LACK OF TRANSPORTATION KEPT YOU FROM MEDICAL APPOINTMENTS OR FROM GETTING MEDICATIONS?: NO

## 2025-02-05 SDOH — ECONOMIC STABILITY: FOOD INSECURITY: HOW HARD IS IT FOR YOU TO PAY FOR THE VERY BASICS LIKE FOOD, HOUSING, MEDICAL CARE, AND HEATING?: HARD

## 2025-02-05 SDOH — SOCIAL STABILITY: SOCIAL INSECURITY: PHYSICAL ABUSE: DENIES

## 2025-02-05 SDOH — ECONOMIC STABILITY: HOUSING INSECURITY: DO YOU FEEL UNSAFE GOING BACK TO THE PLACE WHERE YOU ARE LIVING?: NO

## 2025-02-05 SDOH — HEALTH STABILITY: MENTAL HEALTH: HAVE YOU USED ANY PRESCRIPTION DRUGS OTHER THAN PRESCRIBED IN THE PAST 12 MONTHS?: NO

## 2025-02-05 SDOH — SOCIAL STABILITY: SOCIAL INSECURITY: VERBAL ABUSE: DENIES

## 2025-02-05 SDOH — SOCIAL STABILITY: SOCIAL INSECURITY: HAVE YOU HAD ANY THOUGHTS OF HARMING ANYONE ELSE?: NO

## 2025-02-05 SDOH — HEALTH STABILITY: MENTAL HEALTH: HAVE YOU USED ANY SUBSTANCES (CANABIS, COCAINE, HEROIN, HALLUCINOGENS, INHALANTS, ETC.) IN THE PAST 12 MONTHS?: NO

## 2025-02-05 SDOH — HEALTH STABILITY: MENTAL HEALTH: WERE YOU ABLE TO COMPLETE ALL THE BEHAVIORAL HEALTH SCREENINGS?: YES

## 2025-02-05 ASSESSMENT — LIFESTYLE VARIABLES
HOW OFTEN DO YOU HAVE 6 OR MORE DRINKS ON ONE OCCASION: NEVER
AUDIT-C TOTAL SCORE: 0
AUDIT-C TOTAL SCORE: 0
SKIP TO QUESTIONS 9-10: 1
HOW OFTEN DO YOU HAVE A DRINK CONTAINING ALCOHOL: NEVER
HOW MANY STANDARD DRINKS CONTAINING ALCOHOL DO YOU HAVE ON A TYPICAL DAY: PATIENT DOES NOT DRINK

## 2025-02-05 ASSESSMENT — ACTIVITIES OF DAILY LIVING (ADL)
LACK_OF_TRANSPORTATION: NO
LACK_OF_TRANSPORTATION: NO

## 2025-02-05 ASSESSMENT — PAIN SCALES - GENERAL
PAINLEVEL_OUTOF10: 0 - NO PAIN

## 2025-02-05 ASSESSMENT — PAIN - FUNCTIONAL ASSESSMENT
PAIN_FUNCTIONAL_ASSESSMENT: 0-10

## 2025-02-05 ASSESSMENT — PATIENT HEALTH QUESTIONNAIRE - PHQ9
2. FEELING DOWN, DEPRESSED OR HOPELESS: NOT AT ALL
1. LITTLE INTEREST OR PLEASURE IN DOING THINGS: NOT AT ALL
SUM OF ALL RESPONSES TO PHQ9 QUESTIONS 1 & 2: 0

## 2025-02-05 NOTE — H&P
" OB Admission H&P    Assessment/Plan    25yo G1 @ 22w2d by 19.3wk US p/f LOF.    Oligohydramnios, r/o PPROM  - Pt with hx of oligo since first formal US on , continues to have oligo on BSUS today with JIM 3.4 and no 2x2cm pocket present  - Now with episode of LOF today however no ongoing LOF over last few hours since transfer from OSH  - At OSH, amniosure positive and pooling negative. Here, speculum exam neg x3. GC/CT, wet prep ordered.  - Unclear whether ROM is present vs oligohydramnios from different source  - Admit to [facility] for further workup. Mac imaging US ordered.    Fetal Status  -Doptones wnl  -Presentation transverse based on ultrasound  -EFW 336g on US 25  -GBS collected, pending    Postpartum   -Contraception Plan:  to be discussed    D/w Dr. Tish Gallardo MD PGY-3      Subjective   25yo G1 @ 22w2d by 19.3wk US p/f LOF. States she noticed leaking of fluid in the afternoon on  while getting ready for work. Noticed ongoing drops of fluid but describes it as \"thicker than urine.\" No VB. No cramping or ctx. Presented to an OSH for evaluation. States the LOF was ongoing during the afternoon but has stopped over the last several hours.    Preg n/f:  - Oligohydramnios seen on first US (done at 19.3), JIM 3 at that time. L&D triage evaluation on  for oligo on US that day as well, neg x3 for ROM on exam. SVE c/l/h    OSH course: No pooling on exam. amniosure positive.    OB History    Para Term  AB Living   1 0 0 0 0 0   SAB IAB Ectopic Multiple Live Births   0 0 0 0 0      # Outcome Date GA Lbr Seamus/2nd Weight Sex Type Anes PTL Lv   1 Current                No past surgical history on file.    Social History     Tobacco Use    Smoking status: Not on file    Smokeless tobacco: Not on file   Substance Use Topics    Alcohol use: Not on file       No Known Allergies    Medications Prior to Admission   Medication Sig Dispense Refill Last Dose/Taking    M-Nicky Plus 27 mg iron- " 1 mg tablet         Objective     Last Vitals  Temp Pulse Resp BP MAP O2 Sat   36.7 °C (98.1 °F) 85 17 111/63 82 98 %     Blood Pressures         2/5/2025  0339             BP: 111/63             Physical Exam  General: NAD, mood appropriate  Cardiopulmonary: warm and well perfused, breathing comfortably on room air  Abdomen: Gravid, non-tender  Extremities: Symmetric  Speculum Exam: no pooling of fluid seen, Nitrazine test is negative, Ferning test is negative, deferred. Cervix visually closed. Scant white discharge on cervix present.     Fetal Monitoring  Doptones wnl.    Bedside ultrasound: Yes. Transverse lie. JIM 3.4, no 2x2cm pocket    Labs in chart were reviewed.          Prenatal labs reviewed, not remarkable.

## 2025-02-05 NOTE — CARE PLAN
VSS t/o shift. No s/sx infection. No ctx, cramping, bleeding, or LOF. FHR WDL during spot checks.        Problem: Antepartum  Goal: Maintain pregnancy as long as maternal and/or fetal condition is stable  Outcome: Progressing  Goal: Avoid/minimize constipation  Outcome: Progressing  Goal: No decrease in circulation/VTE  Outcome: Progressing  Goal: FHR remains reassuring  Outcome: Progressing  Goal: Minimize anxiety/maximize coping  Outcome: Progressing     Problem: Infection  Goal: Fever/diaphoresis will improve to <38.0 C  Outcome: Progressing  Goal: Wound will have less exudate and warmth  Outcome: Progressing  Goal: Improvement in s/sx of infection  Outcome: Progressing     Problem: Pain - Adult  Goal: Verbalizes/displays adequate comfort level or baseline comfort level  Outcome: Progressing     Problem: Safety - Adult  Goal: Free from fall injury  Outcome: Progressing     Problem: Fall/Injury  Goal: Not fall by end of shift  Outcome: Progressing  Goal: Be free from injury by end of the shift  Outcome: Progressing  Goal: Verbalize understanding of personal risk factors for fall in the hospital  Outcome: Progressing  Goal: Verbalize understanding of risk factor reduction measures to prevent injury from fall in the home  Outcome: Progressing  Goal: Use assistive devices by end of the shift  Outcome: Progressing  Goal: Pace activities to prevent fatigue by end of the shift  Outcome: Progressing

## 2025-02-05 NOTE — CONSULTS
"NICU PRENATAL CONSULT NOTE   Tatianna Mojica is a 24 y.o.  with LOIDA 2025, by Ultrasound presenting with oligohydramnios (since ) and leakage of fluid with concern for PPROM.    Requesting Physician/Service:  Dr. Boswell/[facility]  Consulting Physician/Service: Dr. Waldrop, Dr. Krishna/ Neonatology    Reason for Consultation: Oligohydramnios, c/f PPROM at 22 weeks gestation    Pregnancy Complications: oligohydramnios of unclear etiology, c/f PPROM     Prenatal labs:   Lab Results   Component Value Date    ABO O 2025    LABRH POS 2025    ABSCRN NEG 2025    RUBIG Positive 2025     Toxicology:   No results found for: \"AMPHETAMINE\", \"MAMPHBLDS\", \"BARBITURATE\", \"BARBSCRNUR\", \"BENZODIAZ\", \"BENZO\", \"BUPRENBLDS\", \"CANNABBLDS\", \"CANNABINOID\", \"COCBLDS\", \"COCAI\", \"METHABLDS\", \"METH\", \"OXYBLDS\", \"OXYCODONE\", \"PCPBLDS\", \"PCP\", \"OPIATBLDS\", \"OPIATE\", \"FENTANYL\", \"DRBLDCOMM\"  Labs:  Lab Results   Component Value Date    HIV1X2 Nonreactive 2025    HEPBSAG Nonreactive 2025    HEPCAB Nonreactive 2025    NEISSGONOAMP Negative 2025    CHLAMTRACAMP Negative 2025    SYPHT Nonreactive 2025     Fetal Imaging:  No results found for this or any previous visit.    Medical History  She has no past medical history on file.     Family History  No family history on file.     Social History  She reports that she has never smoked. She has never used smokeless tobacco. She reports that she does not currently use alcohol. No history on file for drug use.      Assessment/Recommendations:  Resuscitation, ICN care plans and expectations for hospital course for a 22-23 GA infant were discussed including:    RDS/need for surfactant and intubation with mechanical ventilation at delivery; discussed Betamethasone and how it helps to mature the fetal lungs- mom states her OB team brought this up and she states she plans to receive it   Risk for IVH/Developmental outcomes- per NICHD calculator " there is a 5-11% risk of profound neurodevelopmental impairment at 22 weeks GA  OG feedings and use of breastmilk- mom plans to breast feed and is okay with donor breast milk if needed   Risk of infection in the setting of PPROM and need for IV antibiotics   Possibly blood transfusion for anemia and use of bili lights for hyperbilirubinemia, which are both common in  infants  apnea/monitoring and use of caffeine   limits of viability including the possibility of being unable to intubate the infant due to the size of the equipment available  Per NICD calculator, the average survival for 22 GA (female) infants receiving active treatment is 28%. Discussed with mom that this is simply an estimate and it is very difficult to predict outcomes. Informed mom that comfort care options exist, however will discuss further if this is what parents chose to pursue.    Patient made aware that Neonatology will be present for delivery and that we remain available for any questions/concerns that might arise. Thank you.     Electronically signed by:  Starr Waldrop DO  PGY-4 Neonatology Fellow

## 2025-02-05 NOTE — FLOWSHEET NOTE
Pt c/o leaking of fluid starting around 1515 today. Pt denies any cramping or contractions. Pt denies any vaginal bleeding. Pt states that her dr dx her with low amniotic fluid and she is awaiting to have genetic testing done on 2/7 through .

## 2025-02-05 NOTE — FLOWSHEET NOTE
Transport arranged. Pt to go to [facility]. Transport to arrive between [phone]

## 2025-02-05 NOTE — H&P
Department of Obstetrics and Gynecology   History & Physical    Pt Name: Kasia Bailey  MRN: [id number] Acct #: [id number]  YOB: 2000  Estimated Date of Delivery: 25      HPI: The patient is a 24 y.o.  22w4d female who presents to OB triage for LOF. PT states she was getting ready for work and she noticed a gush of fluid. PT states she was soaking her underwear. Pt previously seen by  for oligohydramnios with testing negative for ROM. PT denies any CTX, VB or pains.   +FM, -VB, +LOF, -CTX    Allergies:    Allergies as of 2025    (No Known Allergies)       Medications:  No current facility-administered medications for this encounter.    OB History:     Gyn History: Denies h/o abnormal pap smear, h/o STDs.     Past Medical History: History reviewed. No pertinent past medical history.    Past Surgical History: History reviewed. No pertinent surgical history.    Social History:   Social History     Tobacco Use   Smoking Status Never   Smokeless Tobacco Never        Family History: Noncontributory; Denies h/o cancer.     ROS:  Negative except as stated in HPI, denies nausea, vomiting, fever, chills, headache or dysuria.     PE:  Vitals:    25 2116   Temp: 98.3 °F (36.8 °C)       General: well nourished, well developed, in no acute distress  CV: Normal heart sounds  Resp: breathing unlabored  Abdomen: Nontender, no rebound, no guarding  FH: 24  Cx: LCP, no pooling or bleeding on exam    NST:  for threatened PML, PPROM  FHR 140s, moderate variability, +accels, -decels, appropriate for GA  Ctx: none  Cat 1, reactive  Time for NST: approx 20-40min, please see NST strip for additional information    Labs:   Amniosure positive    Assessment:   24 y.o.  22w4d female with PPROM    Plan:   Plan of care discussed with  and  agrees to transfer of care  Transportation arranged     Pat Ruggiero MD

## 2025-02-05 NOTE — FLOWSHEET NOTE
0215 Transport arrival  0230 Departed unit with [facility] no s/s of distress, no contractions

## 2025-02-05 NOTE — CONSULTS
"[facility] Consult    Reason for Consult: concern for previable PPROM     HPI: Patient presented as transfer of care from OSH given concern for PPROM. States she noticed leaking of fluid in the afternoon on  while getting ready for work. Noticed ongoing drops of fluid after urination but describes it as \"thicker than urine.\" Also reports subsequent leakage that resulted in her having to change her clothing. Denies VB, LOF and cramping or contractions. Presented to an OSH for evaluation. Per their sign out, exam found negative for pooling. Amniosure found to be positive. On presentation, patient reported cessation of LOF. This morning, she continues to deny VB and cramping/contractions. Reporting good fetal movement.     Preg n/f:  - Oligohydramnios seen at 19.3 wga, JIM 3 at that time. L&D triage evaluation on : BSUS notable for oligohydramnios that day as well, neg x3 for ROM on exam. SVE c/l/h    Obstetrical History   OB History          1    Para        Term                AB        Living             SAB        IAB        Ectopic        Multiple        Live Births                     Past Medical History  No past medical history on file.     Past Surgical History   No past surgical history on file.    Social History  Social History     Socioeconomic History    Marital status: Single     Spouse name: Not on file    Number of children: Not on file    Years of education: Not on file    Highest education level: Not on file   Occupational History    Not on file   Tobacco Use    Smoking status: Not on file    Smokeless tobacco: Not on file   Substance and Sexual Activity    Alcohol use: Not on file    Drug use: Not on file    Sexual activity: Not on file   Other Topics Concern    Not on file   Social History Narrative    Not on file     Social Drivers of Health     Financial Resource Strain: Not on file   Food Insecurity: Not on file   Transportation Needs: Not on file   Physical Activity: Not on file "   Stress: Not on file   Social Connections: Not on file   Intimate Partner Violence: Not on file       Allergies  No Known Allergies    Medications  Medications Prior to Admission   Medication Sig Dispense Refill Last Dose/Taking    M- Plus 27 mg iron- 1 mg tablet           OBJECTIVE:   /63   Pulse 76   Temp 37.1 °C (98.8 °F) (Temporal)   Resp 16   LMP 08/15/2024 (Approximate)   SpO2 98%    Temp  Min: 36.7 °C (98.1 °F)  Max: 37.1 °C (98.8 °F)  Pulse  Min: 76  Max: 85  BP  Min: 99/66  Max: 111/63    Physical exam:  General: AAOx3, No acute distress  Cardiovascular: Warm and well perfused  Respiratory: Normal respiratory effort on RA   Abdominal: Soft, gravid, non-tender to palpation    Labs:   Results for orders placed or performed during the hospital encounter of 25 (from the past 24 hours)   Wet prep, genital   Result Value Ref Range    Trichomonas None Seen None Seen    Clue Cells None Seen None Seen    Yeast None Seen None Seen    WBC 3-8    Type And Screen   Result Value Ref Range    ABO TYPE O     Rh TYPE POS     ANTIBODY SCREEN NEG        ASSESSMENT AND PLAN:     24 y.o.  at 22w6d by 11 week US presenting as transfer from OSH with leakage of fluid.      Oligohydramnios  Pt with hx of oligo since first formal US on , continues to have oligo on BSUS on presentation with JIM 3.4 and no 2x2cm pocket present. Now with new episode of LOF   - At OSH, amniosure positive and pooling negative. Here, speculum exam neg x3. GC/CT, wet prep ordered   - Etiology unclear at this time. Differential includes renal malformation vs PPROM vs genetic condition vs placental insuffiencey. Given gestational age and history, most concerning for PPROM   - SSUS today also notable for oligohydramnios. Offered amnio dye test for evaluation of PPROM, however deferred given fetal presentation to prevent injury to fetus. Will reassess if patient desires   - Discussed options for management. NICU consult to be  placed for further counseling    Fetal Status  - Doptones wnl on presentation  - Presentation transverse based on ultrasound  - EFW 336g on US 1/24/25  - GBS collected and pending     Dispo: pending workup for oligohydramnios     Pt seen and discussed with MFM Attending, Dr. Elbert Zuluaga MD, PGY-3  [facility], pager [phone]

## 2025-02-06 PROBLEM — Z3A.23 23 WEEKS GESTATION OF PREGNANCY (HHS-HCC): Status: ACTIVE | Noted: 2025-02-06

## 2025-02-06 PROBLEM — O41.02X0: Status: RESOLVED | Noted: 2025-01-24 | Resolved: 2025-02-06

## 2025-02-06 PROCEDURE — 1220000001 HC OB SEMI-PRIVATE ROOM DAILY

## 2025-02-06 PROCEDURE — 99232 SBSQ HOSP IP/OBS MODERATE 35: CPT

## 2025-02-06 PROCEDURE — 2500000004 HC RX 250 GENERAL PHARMACY W/ HCPCS (ALT 636 FOR OP/ED)

## 2025-02-06 PROCEDURE — 2500000001 HC RX 250 WO HCPCS SELF ADMINISTERED DRUGS (ALT 637 FOR MEDICARE OP)

## 2025-02-06 RX ADMIN — BETAMETHASONE SODIUM PHOSPHATE AND BETAMETHASONE ACETATE 12 MG: 3; 3 INJECTION, SUSPENSION INTRA-ARTICULAR; INTRALESIONAL; INTRAMUSCULAR at 16:18

## 2025-02-06 RX ADMIN — AMPICILLIN SODIUM 2 G: 2 INJECTION, POWDER, FOR SOLUTION INTRAMUSCULAR; INTRAVENOUS at 16:18

## 2025-02-06 RX ADMIN — PRENATAL VIT W/ FE FUMARATE-FA TAB 27-0.8 MG 1 TABLET: 27-0.8 TAB at 10:31

## 2025-02-06 RX ADMIN — AMPICILLIN SODIUM 2 G: 2 INJECTION, POWDER, FOR SOLUTION INTRAMUSCULAR; INTRAVENOUS at 10:31

## 2025-02-06 RX ADMIN — AMPICILLIN SODIUM 2 G: 2 INJECTION, POWDER, FOR SOLUTION INTRAMUSCULAR; INTRAVENOUS at 04:24

## 2025-02-06 RX ADMIN — AMPICILLIN SODIUM 2 G: 2 INJECTION, POWDER, FOR SOLUTION INTRAMUSCULAR; INTRAVENOUS at 22:25

## 2025-02-06 ASSESSMENT — PAIN SCALES - GENERAL
PAINLEVEL_OUTOF10: 0 - NO PAIN

## 2025-02-06 ASSESSMENT — PAIN - FUNCTIONAL ASSESSMENT
PAIN_FUNCTIONAL_ASSESSMENT: 0-10

## 2025-02-06 NOTE — CARE PLAN
Problem: Antepartum  Goal: Maintain pregnancy as long as maternal and/or fetal condition is stable  Outcome: Progressing  Goal: Avoid/minimize constipation  Outcome: Progressing  Goal: No decrease in circulation/VTE  Outcome: Progressing  Goal: FHR remains reassuring  Outcome: Progressing  Goal: Minimize anxiety/maximize coping  Outcome: Progressing     Problem: Infection  Goal: Fever/diaphoresis will improve to <38.0 C  Outcome: Progressing  Goal: Wound will have less exudate and warmth  Outcome: Progressing  Goal: Improvement in s/sx of infection  Outcome: Progressing    The clinical goals for the shift include no s/sx of infection    Over the shift, the patient did make progress toward the following goals.

## 2025-02-06 NOTE — PROGRESS NOTES
"ANTEPARTUM PROGRESS NOTE   2025, 8:05 AM     SUBJECTIVE:  No acute events overnight. Patient fast asleep this morning.   Per support person, denies painful contractions, VB, LOF. Reports normal fetal movement.      OBJECTIVE:   /67 (BP Location: Left arm, Patient Position: Sitting)   Pulse 82   Temp 36.8 °C (98.2 °F) (Temporal)   Resp 16   Ht 1.727 m (5' 8\")   Wt 80.4 kg (177 lb 3.2 oz)   LMP 2024   SpO2 98%   BMI 26.94 kg/m²    Temp  Min: 36 °C (96.8 °F)  Max: 36.8 °C (98.2 °F)  Pulse  Min: 72  Max: 92  BP  Min: 97/60  Max: 130/66    General:  AAOx3, No acute distress  Cardiovascular: Warm and well perfused  Respiratory: Normal respiratory effort   Abdominal:  Soft, gravid, non-tender, no rebound or guarding  Extremities: Warm, well perfused, no edema, no calf tenderness   Pelvic:    deferred    FHR pos on admission    Labs:   No results found for: \"WBC\", \"HGB\", \"HCT\", \"PLT\"  No results found for: \"GLUCOSE\", \"NA\", \"K\", \"CL\", \"CO2\", \"ANIONGAP\", \"BUN\", \"CREATININE\", \"EGFR\", \"CALCIUM\", \"ALBUMIN\", \"PROT\", \"ALKPHOS\", \"ALT\", \"AST\", \"BILITOT\"    ASSESSMENT AND PLAN:   24 y.o.  at 22w6d by 19.3wk US with concerns for  PPROM vs oligohydramnios.    Oligohydramnios, presumed PPROM  Pt with hx of oligo since first formal US on , continues to have oligo on BSUS on presentation with JIM 3.4 and no 2x2cm pocket present. Now with new episode of LOF. At OSH, amniosure positive and pooling negative. Here, speculum exam neg x3.   - GC/CT, wet prep nml on arrival  - SSUS  also notable for oligohydramnios. Pt was offered amnio dye test for evaluation of PPROM, however deferred given fetal presentation to prevent injury to fetus. Will reassess if patient desires.  - Etiology unclear at this time. Differential includes renal malformation vs PPROM vs genetic condition vs placental insuffiencey. Given gestational age and history, most concerning for PPROM at this time.  - Currently started latency " antibiotics, Day 2  - S/p BMZ #1, fr next dose in 24hrs  - S/p NICU consult     Fetal status: Dopp tones until 23.0  - Daily NSTs while inpatient starting 2/7  - Ultrasound: EFW 336g on US 1/24/25, transverse  - S/p BMZ #1, fr next dose in 24hrs  - S/p NICU consult     Routine:  - GBS collected, pending  - TDAP not yet indicated  - Flu to offer if still available  - 1hr not yet indicated  - BCM: to be discussed    Dispo: Continue inpatient admission until delivery at 34 wks or sooner for fetal or maternal indications    Pt seen and discussed with M Attending, Dr. Boswell.     Laney Huff MD   PGY-2, [facility]  Pager [phone]     Principal Problem:    Oligohydramnios antepartum, second trimester, not applicable or unspecified fetus ([facility])  Active Problems:    23 weeks gestation of pregnancy ([facility])

## 2025-02-06 NOTE — SIGNIFICANT EVENT
"Exam    House officer to bedside for SSE and Actim-PROM test. Denies fevers/chills, fundal tenderness. Reports normal fetal movement. No VB, CTX. Intermittent LOF.    /72   Pulse 95   Temp 36.8 °C (98.2 °F) (Temporal)   Resp 16   Ht 1.727 m (5' 8\")   Wt 80.4 kg (177 lb 3.2 oz)   LMP 09/16/2024   SpO2 98%   BMI 26.94 kg/m²      General: no acute distress  HEENT: normocephalic, atraumatic  Heart: warm and well perfused  Lungs: breathing comfortably on room air  Abdomen: gravid, nontender  Extremities: moving all extremities spontaneously  Neuro: awake and conversant  Psych: appropriate mood and affect     Actim-PROM: NEG    D/w Dr. Boswell.  Laney Huff MD   PGY-2, MFM  "

## 2025-02-06 NOTE — CARE PLAN
VSS t/o shift. No s/sx infection. No ctx, cramping, or bleeding. LOF scant and yellow. FHR WDL during spot checks.        Problem: Antepartum  Goal: Maintain pregnancy as long as maternal and/or fetal condition is stable  Outcome: Progressing  Goal: Avoid/minimize constipation  Outcome: Progressing  Goal: No decrease in circulation/VTE  Outcome: Progressing  Goal: FHR remains reassuring  Outcome: Progressing  Goal: Minimize anxiety/maximize coping  Outcome: Progressing     Problem: Infection  Goal: Fever/diaphoresis will improve to <38.0 C  Outcome: Progressing  Goal: Wound will have less exudate and warmth  Outcome: Progressing  Goal: Improvement in s/sx of infection  Outcome: Progressing     Problem: Pain - Adult  Goal: Verbalizes/displays adequate comfort level or baseline comfort level  Outcome: Progressing     Problem: Safety - Adult  Goal: Free from fall injury  Outcome: Progressing     Problem: Fall/Injury  Goal: Not fall by end of shift  Outcome: Progressing  Goal: Be free from injury by end of the shift  Outcome: Progressing  Goal: Verbalize understanding of personal risk factors for fall in the hospital  Outcome: Progressing  Goal: Verbalize understanding of risk factor reduction measures to prevent injury from fall in the home  Outcome: Progressing  Goal: Use assistive devices by end of the shift  Outcome: Progressing  Goal: Pace activities to prevent fatigue by end of the shift  Outcome: Progressing

## 2025-02-07 ENCOUNTER — APPOINTMENT (OUTPATIENT)
Dept: MATERNAL FETAL MEDICINE | Facility: CLINIC | Age: 25
End: 2025-02-07
Payer: COMMERCIAL

## 2025-02-07 ENCOUNTER — APPOINTMENT (OUTPATIENT)
Dept: RADIOLOGY | Facility: CLINIC | Age: 25
End: 2025-02-07
Payer: COMMERCIAL

## 2025-02-07 PROBLEM — O42.919 PRETERM PREMATURE RUPTURE OF MEMBRANES (PPROM) WITH UNKNOWN ONSET OF LABOR (HHS-HCC): Status: ACTIVE | Noted: 2025-02-07

## 2025-02-07 PROCEDURE — 1220000001 HC OB SEMI-PRIVATE ROOM DAILY

## 2025-02-07 PROCEDURE — 2500000001 HC RX 250 WO HCPCS SELF ADMINISTERED DRUGS (ALT 637 FOR MEDICARE OP)

## 2025-02-07 PROCEDURE — 59025 FETAL NON-STRESS TEST: CPT | Mod: GC

## 2025-02-07 PROCEDURE — 99232 SBSQ HOSP IP/OBS MODERATE 35: CPT

## 2025-02-07 PROCEDURE — 2500000004 HC RX 250 GENERAL PHARMACY W/ HCPCS (ALT 636 FOR OP/ED)

## 2025-02-07 PROCEDURE — 59025 FETAL NON-STRESS TEST: CPT

## 2025-02-07 RX ADMIN — PRENATAL VIT W/ FE FUMARATE-FA TAB 27-0.8 MG 1 TABLET: 27-0.8 TAB at 09:39

## 2025-02-07 RX ADMIN — AMPICILLIN SODIUM 2 G: 2 INJECTION, POWDER, FOR SOLUTION INTRAMUSCULAR; INTRAVENOUS at 04:20

## 2025-02-07 RX ADMIN — AMOXICILLIN 500 MG: 500 CAPSULE ORAL at 15:59

## 2025-02-07 RX ADMIN — AMOXICILLIN 500 MG: 500 CAPSULE ORAL at 23:46

## 2025-02-07 RX ADMIN — AMPICILLIN SODIUM 2 G: 2 INJECTION, POWDER, FOR SOLUTION INTRAMUSCULAR; INTRAVENOUS at 10:07

## 2025-02-07 ASSESSMENT — PAIN - FUNCTIONAL ASSESSMENT
PAIN_FUNCTIONAL_ASSESSMENT: 0-10

## 2025-02-07 ASSESSMENT — PAIN SCALES - GENERAL
PAINLEVEL_OUTOF10: 0 - NO PAIN

## 2025-02-07 NOTE — PROGRESS NOTES
Social Work Assessment       Patient: Tatianna Mojica   Address: [address] [place], [place] [id number]  Phone: [phone]    Referral Reason: Risk Screen - Long Term Stay     Prenatal Care: Yes    [place] Name: Likely Baby Girl (not yet delivered)    : LOIDA 25    Other Children: No     Household Composition: Ms. Mojica and her significant other/FOB, Ignacio Rg reside in home together with a roommate.     IPV/DV or Safety Concerns: No safety concerns identified.     Car-Seat: Not yet  Safe Sleep Space: Yes  - Ms. Mojica stated not yet prepared for child due to only 23 weeks gestation, but is aware of baby supplies needed.  informed her of having time to obtain items.     Transportation Concerns: No concerns.     School/Work/Income: Ms. Mojica stated she is an  at [employer] and that her supervisor is supportive. She stated her current leave is until 25 once submitted. She stated her employer is trying to help financially, but maternity leave will be unpaid.     Insurance: [employer] [place].    also sent referral to Hospital Referral Services (HRS) due to long-term stay and financial changes.     Mental Health Diagnoses: Ms. Mojica denied any history of mental health.  did acknowledge long-term admission can be emotionally challenging and encouraged her to reach out to  with any questions or concerns or needs.     Medication(s): N/A   Counseling: N/A     Supports: Ms. Mojica stated her significant other/FOB/Mr. Rg is of good support. She stated they both have limited family, but that she has an aunt who resides in [place] and Mr. Rg's father who resides in [place] as well as good friend support.     Substance Use History: No     Toxicology Screens: No       Assessment:  received referral due to long-term stay and reviewed chart. Ms. Mojica admitted to hospital on 25 and is  now 23 weeks gestation with first child (per ultrasound, appears to be a baby girl). The plan is for Ms. Mojica to remain admitted until delivery (ideal til 34 weeks gestation).    was able to call/speak with Ms. Mojica to introduce self, obtain additional information, and provide support and assistance as may be needed at this time. Ms. Mojica was open and receptive to talking with .    acknowledged 23 week gestation pregnancy and plan which Ms. Mojica sounded to be understanding of and in agreement with. She is aware of child to be admitted to NICU once delivered and informed of continued social work assistance through her admission and child's admission.   Concern/stressor at this time is financial due to no longer being able to work at this time.  made referral to [facility] to check eligibility for services and assured her of continued follow-up as there are other programs pending need.   Support provided and self-care encouraged.       Plan: Referral to HRS made.   Social work to remain available to provide support and assistance as needed through admission.       Signature: YOSELIN Carter

## 2025-02-07 NOTE — CARE PLAN
The patient's goals for the shift include      The clinical goals for the shift include no s/sx of infection    Patient stable throughout shift. VSS. No s/sx of infection. Completed IV antibiotics and transitioned to PO.

## 2025-02-07 NOTE — PROGRESS NOTES
"ANTEPARTUM PROGRESS NOTE   2025, 7:21 AM     SUBJECTIVE: No acute events overnight. Denies VB, LOF     OBJECTIVE:   BP 96/54 (BP Location: Left arm, Patient Position: Lying)   Pulse 73   Temp 36.6 °C (97.9 °F) (Temporal)   Resp 16   Ht 1.727 m (5' 8\")   Wt 80.4 kg (177 lb 3.2 oz)   LMP 2024   SpO2 98%   BMI 26.94 kg/m²    Temp  Min: 36.3 °C (97.3 °F)  Max: 37 °C (98.6 °F)  Pulse  Min: 73  Max: 95  BP  Min: 94/53  Max: 106/67    General:  AAOx3, No acute distress  Cardiovascular: Warm and well perfused  Respiratory: Normal respiratory effort on RA  Abdominal:  Soft, gravid, non-tender, no rebound or guarding    ASSESSMENT AND PLAN:   24 y.o.  at 23w0d by 19.3wk US with concerns for PPROM vs oligohydramnios.    Oligohydramnios, presumed PPROM  - Hx of oligo since first formal US on  and again on , JIM 4.2 cm. Amniosure positive at OSH prior to transfer. Speculum exam neg x3 on admission to [facility]. Actim Prom negative on   - Amnio dye test for evaluation of PPROM, however deferred given fetal presentation to prevent injury to fetus. Will reassess if patient desires however planning to continue management as presumes PPROM   - GC/CT, wet prep negative  - Latency antibiotics, Day 3  - S/p BMZ -  - S/p NICU consult   - Planning to continue inpatient management until 34.0 wga unless delivery indicated prior for fetal or maternal compromise      Fetal status  - NST AGA this morning, continue daily NSTs while admitted   - Ultrasound: EFW 336g on US 25, BREECH on   - S/p BMZ -  - S/p NICU consult     Routine   - GBS collected , pending  - TDAP not yet indicated  - Flu to offer if still available  - 1hr not yet indicated  - BCM: to be discussed    Dispo: Continue inpatient admission until delivery at 34 wks or sooner for fetal or maternal indications    Pt seen and discussed with ADRIANM Attending, Dr. Arie Zuluaga MD, PGY-3   [facility], pager [phone]     Principal " Problem:     premature rupture of membranes (PPROM) with unknown onset of labor ([facility])  Active Problems:    Oligohydramnios antepartum, second trimester, not applicable or unspecified fetus ([facility])    23 weeks gestation of pregnancy ([facility])

## 2025-02-08 PROCEDURE — 2500000001 HC RX 250 WO HCPCS SELF ADMINISTERED DRUGS (ALT 637 FOR MEDICARE OP)

## 2025-02-08 PROCEDURE — 59025 FETAL NON-STRESS TEST: CPT

## 2025-02-08 PROCEDURE — 59025 FETAL NON-STRESS TEST: CPT | Mod: GC

## 2025-02-08 PROCEDURE — 99232 SBSQ HOSP IP/OBS MODERATE 35: CPT

## 2025-02-08 PROCEDURE — 1220000001 HC OB SEMI-PRIVATE ROOM DAILY

## 2025-02-08 RX ADMIN — PRENATAL VIT W/ FE FUMARATE-FA TAB 27-0.8 MG 1 TABLET: 27-0.8 TAB at 08:40

## 2025-02-08 RX ADMIN — AMOXICILLIN 500 MG: 500 CAPSULE ORAL at 15:41

## 2025-02-08 RX ADMIN — AMOXICILLIN 500 MG: 500 CAPSULE ORAL at 08:40

## 2025-02-08 RX ADMIN — AMOXICILLIN 500 MG: 500 CAPSULE ORAL at 23:51

## 2025-02-08 ASSESSMENT — PAIN SCALES - GENERAL
PAINLEVEL_OUTOF10: 0 - NO PAIN

## 2025-02-08 ASSESSMENT — PAIN - FUNCTIONAL ASSESSMENT
PAIN_FUNCTIONAL_ASSESSMENT: 0-10

## 2025-02-08 NOTE — CARE PLAN
VSS t/o shift. No s/sx infection. No ctx, cramping, or bleeding. Pt had no LOF today. FHR WDL during spot checks.       Problem: Antepartum  Goal: Maintain pregnancy as long as maternal and/or fetal condition is stable  Outcome: Progressing  Goal: Avoid/minimize constipation  Outcome: Progressing  Goal: No decrease in circulation/VTE  Outcome: Progressing  Goal: FHR remains reassuring  Outcome: Progressing  Goal: Minimize anxiety/maximize coping  Outcome: Progressing     Problem: Infection  Goal: Fever/diaphoresis will improve to <38.0 C  Outcome: Progressing  Goal: Wound will have less exudate and warmth  Outcome: Progressing  Goal: Improvement in s/sx of infection  Outcome: Progressing     Problem: Pain - Adult  Goal: Verbalizes/displays adequate comfort level or baseline comfort level  Outcome: Progressing     Problem: Safety - Adult  Goal: Free from fall injury  Outcome: Progressing     Problem: Fall/Injury  Goal: Not fall by end of shift  Outcome: Progressing  Goal: Be free from injury by end of the shift  Outcome: Progressing  Goal: Verbalize understanding of personal risk factors for fall in the hospital  Outcome: Progressing  Goal: Verbalize understanding of risk factor reduction measures to prevent injury from fall in the home  Outcome: Progressing  Goal: Use assistive devices by end of the shift  Outcome: Progressing  Goal: Pace activities to prevent fatigue by end of the shift  Outcome: Progressing

## 2025-02-08 NOTE — PROGRESS NOTES
"ANTEPARTUM PROGRESS NOTE   2025, 5:32 AM     SUBJECTIVE: No acute events overnight. Denies vb, lof, or ctx    OBJECTIVE:   /60 (BP Location: Left arm, Patient Position: Lying)   Pulse 81   Temp 36.5 °C (97.7 °F) (Temporal)   Resp 16   Ht 1.727 m (5' 8\")   Wt 80.4 kg (177 lb 3.2 oz)   LMP 2024   SpO2 98%   BMI 26.94 kg/m²    Temp  Min: 36.3 °C (97.3 °F)  Max: 37.2 °C (99 °F)  Pulse  Min: 70  Max: 90  BP  Min: 99/67  Max: 114/68    General:  AAOx3, No acute distress  Cardiovascular: Warm and well perfused  Respiratory: Normal respiratory effort on RA  Abdominal:  Soft, gravid, non-tender, no rebound or guarding    ASSESSMENT AND PLAN:   24 y.o.  at 23w1d by 19.3wk US with concerns for PPROM vs oligohydramnios.    Oligohydramnios, presumed PPROM  - Hx of oligo since first formal US on  and again on , JIM 4.2 cm. Amniosure positive at OSH prior to transfer. Speculum exam neg x3 on admission to [facility]. Actim Prom negative on   - Amnio dye test for evaluation of PPROM, however deferred given fetal presentation to prevent injury to fetus. Will reassess if patient desires however planning to continue management as presumed PPROM   - GC/CT, wet prep negative  - Latency antibiotics, Day 4  - S/p BMZ -  - S/p NICU consult   - Planning to continue inpatient management until 34.0 wga unless delivery indicated prior for fetal or maternal compromise      Fetal status  - NST AGA this morning, continue daily NSTs while admitted   - Ultrasound: EFW 336g on US 25, BREECH on   - S/p BMZ -  - S/p NICU consult     Routine   - GBS collected , pending  - TDAP not yet indicated  - Flu to offer if still available  - 1hr not yet indicated  - BCM: to be discussed    Dispo: Continue inpatient admission until delivery at 34 wks or sooner for fetal or maternal indications    Pt to be seen & d/w Dr. Arie Tao MD, PGY-1  [facility], pager [phone]     "

## 2025-02-08 NOTE — PROGRESS NOTES
"ANTEPARTUM PROGRESS NOTE   2025, 5:32 AM     SUBJECTIVE: No acute events overnight. Denies vb, lof, or ctx    OBJECTIVE:   /60 (BP Location: Left arm, Patient Position: Lying)   Pulse 81   Temp 36.5 °C (97.7 °F) (Temporal)   Resp 16   Ht 1.727 m (5' 8\")   Wt 80.4 kg (177 lb 3.2 oz)   LMP 2024   SpO2 98%   BMI 26.94 kg/m²    Temp  Min: 36.3 °C (97.3 °F)  Max: 37.2 °C (99 °F)  Pulse  Min: 70  Max: 90  BP  Min: 99/67  Max: 114/68    General:  AAOx3, No acute distress  Cardiovascular: Warm and well perfused  Respiratory: Normal respiratory effort on RA  Abdominal:  Soft, gravid, non-tender, no rebound or guarding    NST: 135/mod/+accel/-decel   [place]: no contractions     ASSESSMENT AND PLAN:   24 y.o.  at 23w1d by 19.3wk US with concerns for PPROM vs oligohydramnios.    Oligohydramnios, presumed PPROM  - Hx of oligo since first formal US on  and again on , JIM 4.2 cm. Amniosure positive at OSH prior to transfer. Speculum exam neg x3 on admission to [facility]. Actim Prom negative on   - Amnio dye test for evaluation of PPROM, however deferred given fetal presentation to prevent injury to fetus. Will reassess if patient desires however planning to continue management as presumed PPROM   - GC/CT, wet prep negative  - Latency antibiotics, Day 4  - S/p BMZ -  - S/p NICU consult   - Planning to continue inpatient management until 34.0 wga unless delivery indicated prior for fetal or maternal compromise      Fetal status  - NST AGA this morning, continue daily NSTs while admitted   - Ultrasound: EFW 336g on US 25, BREECH on   - S/p BMZ -  - S/p NICU consult     Routine   - GBS collected , pending  - TDAP not yet indicated  - Flu to offer if still available  - 1hr not yet indicated  - BCM: to be discussed    Dispo: Continue inpatient admission until delivery at 34 wks or sooner for fetal or maternal indications    Pt to be seen & d/w Dr. Arie Tao, " MD, PGY-1  [facility], pager [phone]     Principal Problem:     premature rupture of membranes (PPROM) with unknown onset of labor ([facility])  Active Problems:    Oligohydramnios antepartum, second trimester, not applicable or unspecified fetus ([facility])    23 weeks gestation of pregnancy ([facility])

## 2025-02-09 VITALS
HEART RATE: 84 BPM | SYSTOLIC BLOOD PRESSURE: 119 MMHG | DIASTOLIC BLOOD PRESSURE: 73 MMHG | TEMPERATURE: 99.3 F | RESPIRATION RATE: 18 BRPM | HEIGHT: 68 IN | WEIGHT: 178.35 LBS | BODY MASS INDEX: 27.03 KG/M2 | OXYGEN SATURATION: 99 %

## 2025-02-09 LAB — GP B STREP GENITAL QL CULT: ABNORMAL

## 2025-02-09 PROCEDURE — 59025 FETAL NON-STRESS TEST: CPT

## 2025-02-09 PROCEDURE — 2500000001 HC RX 250 WO HCPCS SELF ADMINISTERED DRUGS (ALT 637 FOR MEDICARE OP)

## 2025-02-09 PROCEDURE — 99232 SBSQ HOSP IP/OBS MODERATE 35: CPT

## 2025-02-09 PROCEDURE — 1220000001 HC OB SEMI-PRIVATE ROOM DAILY

## 2025-02-09 PROCEDURE — 59025 FETAL NON-STRESS TEST: CPT | Mod: GC

## 2025-02-09 RX ADMIN — AMOXICILLIN 500 MG: 500 CAPSULE ORAL at 16:19

## 2025-02-09 RX ADMIN — AMOXICILLIN 500 MG: 500 CAPSULE ORAL at 09:17

## 2025-02-09 RX ADMIN — PRENATAL VIT W/ FE FUMARATE-FA TAB 27-0.8 MG 1 TABLET: 27-0.8 TAB at 09:17

## 2025-02-09 ASSESSMENT — PAIN SCALES - GENERAL
PAINLEVEL_OUTOF10: 0 - NO PAIN
PAINLEVEL_OUTOF10: 3

## 2025-02-09 ASSESSMENT — PAIN DESCRIPTION - DESCRIPTORS: DESCRIPTORS: CRAMPING;SHARP

## 2025-02-09 ASSESSMENT — PAIN - FUNCTIONAL ASSESSMENT
PAIN_FUNCTIONAL_ASSESSMENT: 0-10

## 2025-02-09 NOTE — CARE PLAN
Problem: Antepartum  Goal: Maintain pregnancy as long as maternal and/or fetal condition is stable  Outcome: Progressing  Goal: Avoid/minimize constipation  Outcome: Progressing  Goal: No decrease in circulation/VTE  Outcome: Progressing  Goal: FHR remains reassuring  Outcome: Progressing  Goal: Minimize anxiety/maximize coping  Outcome: Progressing     Problem: Infection  Goal: Fever/diaphoresis will improve to <38.0 C  Outcome: Progressing  Goal: Wound will have less exudate and warmth  Outcome: Progressing  Goal: Improvement in s/sx of infection  Outcome: Progressing     Problem: Pain - Adult  Goal: Verbalizes/displays adequate comfort level or baseline comfort level  Outcome: Progressing     Problem: Safety - Adult  Goal: Free from fall injury  Outcome: Progressing     Problem: Fall/Injury  Goal: Not fall by end of shift  Outcome: Progressing  Goal: Be free from injury by end of the shift  Outcome: Progressing  Goal: Verbalize understanding of personal risk factors for fall in the hospital  Outcome: Progressing  Goal: Verbalize understanding of risk factor reduction measures to prevent injury from fall in the home  Outcome: Progressing  Goal: Use assistive devices by end of the shift  Outcome: Progressing  Goal: Pace activities to prevent fatigue by end of the shift  Outcome: Progressing   The patient's goals for the shift include stay pregnant.    The clinical goals for the shift include no s/sx of infection today.    VSS and NST this am was AGA.Her abd. Is non-tender.She has had no LOF today.She is now on po ATB.'Ethan steroids are completed.She has no cramps or VB.Stable.

## 2025-02-09 NOTE — PROGRESS NOTES
"ANTEPARTUM PROGRESS NOTE   2025, 5:32 AM     SUBJECTIVE: No acute events overnight. Denies vb, lof, or ctx    OBJECTIVE:   /60 (BP Location: Left arm, Patient Position: Lying)   Pulse 81   Temp 36.5 °C (97.7 °F) (Temporal)   Resp 16   Ht 1.727 m (5' 8\")   Wt 80.4 kg (177 lb 3.2 oz)   LMP 2024   SpO2 98%   BMI 26.94 kg/m²    Temp  Min: 36.3 °C (97.3 °F)  Max: 37.2 °C (99 °F)  Pulse  Min: 70  Max: 90  BP  Min: 99/67  Max: 114/68    General:  AAOx3, No acute distress  Cardiovascular: Warm and well perfused  Respiratory: Normal respiratory effort on RA  Abdominal:  Soft, gravid, non-tender, no rebound or guarding    NST: 135/mod brianna/+accels/ no decels  [place]: no contractions     ASSESSMENT AND PLAN:   24 y.o.  at 23w2d by 19.3wk US with suspected PPROM    Oligohydramnios, presumed PPROM  - Hx of oligo since first formal US on  and again on , JIM 4.2 cm. Amniosure positive at OSH prior to transfer. Speculum exam neg x3 on admission to [facility]. Actim Prom negative on   - Amnio dye test for evaluation of PPROM, however deferred given fetal presentation to prevent injury to fetus. Will reassess if patient desires however planning to continue management as presumed PPROM   - GC/CT, wet prep negative  - Latency antibiotics, Day 5  - S/p BMZ -  - S/p NICU consult   - Planning to continue inpatient management until 34.0 wga unless delivery indicated prior for fetal or maternal compromise      Fetal status  - NST AGA this morning, continue daily NSTs while admitted   - Ultrasound: EFW 336g on US 25, BREECH on   - S/p BMZ -  - S/p NICU consult     Routine   - GBS collected , pending  - TDAP not yet indicated  - Flu to offer if still available  - 1hr not yet indicated  - BCM: to be discussed    Dispo: Continue inpatient admission until delivery at 34 wks or sooner for fetal or maternal indications    Pt to be seen & d/w Dr. Arie Tao MD, PGY-1  [facility], " pager [phone]

## 2025-02-10 LAB
ABO GROUP (TYPE) IN BLOOD: NORMAL
ANTIBODY SCREEN: NORMAL
RH FACTOR (ANTIGEN D): NORMAL

## 2025-02-10 PROCEDURE — 2500000001 HC RX 250 WO HCPCS SELF ADMINISTERED DRUGS (ALT 637 FOR MEDICARE OP)

## 2025-02-10 PROCEDURE — 1220000001 HC OB SEMI-PRIVATE ROOM DAILY

## 2025-02-10 PROCEDURE — 59025 FETAL NON-STRESS TEST: CPT

## 2025-02-10 PROCEDURE — 99232 SBSQ HOSP IP/OBS MODERATE 35: CPT

## 2025-02-10 PROCEDURE — 86923 COMPATIBILITY TEST ELECTRIC: CPT

## 2025-02-10 PROCEDURE — 59025 FETAL NON-STRESS TEST: CPT | Mod: GC

## 2025-02-10 PROCEDURE — 86901 BLOOD TYPING SEROLOGIC RH(D): CPT

## 2025-02-10 PROCEDURE — 36415 COLL VENOUS BLD VENIPUNCTURE: CPT

## 2025-02-10 PROCEDURE — 87081 CULTURE SCREEN ONLY: CPT

## 2025-02-10 RX ORDER — CYCLOBENZAPRINE HCL 10 MG
5 TABLET ORAL 3 TIMES DAILY PRN
Status: DISCONTINUED | OUTPATIENT
Start: 2025-02-10 | End: 2025-04-23

## 2025-02-10 RX ADMIN — CYCLOBENZAPRINE 5 MG: 10 TABLET, FILM COATED ORAL at 01:02

## 2025-02-10 RX ADMIN — PRENATAL VIT W/ FE FUMARATE-FA TAB 27-0.8 MG 1 TABLET: 27-0.8 TAB at 08:13

## 2025-02-10 RX ADMIN — AMOXICILLIN 500 MG: 500 CAPSULE ORAL at 00:26

## 2025-02-10 RX ADMIN — AMOXICILLIN 500 MG: 500 CAPSULE ORAL at 15:39

## 2025-02-10 RX ADMIN — AMOXICILLIN 500 MG: 500 CAPSULE ORAL at 08:13

## 2025-02-10 ASSESSMENT — PAIN - FUNCTIONAL ASSESSMENT
PAIN_FUNCTIONAL_ASSESSMENT: 0-10

## 2025-02-10 ASSESSMENT — PAIN SCALES - GENERAL
PAINLEVEL_OUTOF10: 0 - NO PAIN
PAINLEVEL_OUTOF10: 3

## 2025-02-10 ASSESSMENT — PAIN DESCRIPTION - DESCRIPTORS: DESCRIPTORS: CRAMPING;SHARP;SPASM

## 2025-02-10 NOTE — CARE PLAN
The patient's goals for the shift include Rest    The clinical goals for the shift include No s/sx of infection throughout shift    Patient remained free from falls/injury throughout shift. VSS and no s/sx of infection at this time. Patient denies n/v, fever, or chills. FHR remained reassuring with spot check throughout shift. No changes in amniotic fluid throughout shift, and patient declines abd tenderness, ctx or cramping, and vaginal bleeding. Patient resting comfortably in bed and denies needs at this time.

## 2025-02-10 NOTE — PROGRESS NOTES
"ANTEPARTUM PROGRESS NOTE   2025, 5:32 AM     SUBJECTIVE: No acute events overnight. Denies vb, lof, or ctxs, fevers, chills, sweats, N/V and abdominal pain.     OBJECTIVE:   /60 (BP Location: Left arm, Patient Position: Lying)   Pulse 81   Temp 36.5 °C (97.7 °F) (Temporal)   Resp 16   Ht 1.727 m (5' 8\")   Wt 80.4 kg (177 lb 3.2 oz)   LMP 2024   SpO2 98%   BMI 26.94 kg/m²    Temp  Min: 36.3 °C (97.3 °F)  Max: 37.2 °C (99 °F)  Pulse  Min: 70  Max: 90  BP  Min: 99/67  Max: 114/68    General: AAOx3, No acute distress  Cardiovascular: Warm and well perfused  Respiratory: Normal respiratory effort on RA  Abdominal:  Soft, gravid, non-tender to palpation     NST: 130/mod/+accel/-decel   [place]: no contractions     ASSESSMENT AND PLAN:   24 y.o.  at 23w3d by 19.3wk US with suspected PPROM    Oligohydramnios, presumed PPROM  - Hx of oligo since first formal US on  and again on , JIM 4.2 cm. Amniosure positive at OSH prior to transfer. Speculum exam neg x3 on admission to [facility]. Actim Prom negative on   - Amnio dye test for evaluation of PPROM deferred given absence of pocket of fluid. Will reassess if patient desires however planning to continue management as presumed PPROM  - GC/CT, wet prep negative  - Latency antibiotics, Day 6  - S/p BMZ -  - S/p NICU consult   - Planning to continue inpatient management until 34.0 wga unless delivery indicated prior for fetal or maternal compromise      Fetal status  - NST AGA this morning, continue daily NSTs while admitted   - Ultrasound: EFW 336g on US 25, BREECH on   - S/p BMZ -  - S/p NICU consult     Routine   - GBS collected  but negative for GBS [place], will need to be repeated after latency antibiotics but will plan to treat as positive in the event of PTL given prematurity   - TDAP not yet indicated  - 1hr not yet indicated  - BCM: to be discussed    Dispo: Continue inpatient admission until delivery at 34 wks or " sooner for fetal or maternal indications    Pt to be seen & d/w Dr. Arie Zuluaga MD, PGY-3   [facility], pager [phone]     Principal Problem:     premature rupture of membranes (PPROM) with unknown onset of labor ([facility])  Active Problems:    Oligohydramnios antepartum, second trimester, not applicable or unspecified fetus ([facility])    23 weeks gestation of pregnancy ([facility])

## 2025-02-11 PROCEDURE — 59025 FETAL NON-STRESS TEST: CPT | Mod: GC

## 2025-02-11 PROCEDURE — 59025 FETAL NON-STRESS TEST: CPT

## 2025-02-11 PROCEDURE — 99232 SBSQ HOSP IP/OBS MODERATE 35: CPT

## 2025-02-11 PROCEDURE — 2500000001 HC RX 250 WO HCPCS SELF ADMINISTERED DRUGS (ALT 637 FOR MEDICARE OP)

## 2025-02-11 PROCEDURE — 1220000001 HC OB SEMI-PRIVATE ROOM DAILY

## 2025-02-11 RX ADMIN — AMOXICILLIN 500 MG: 500 CAPSULE ORAL at 08:45

## 2025-02-11 RX ADMIN — PRENATAL VIT W/ FE FUMARATE-FA TAB 27-0.8 MG 1 TABLET: 27-0.8 TAB at 08:45

## 2025-02-11 RX ADMIN — AMOXICILLIN 500 MG: 500 CAPSULE ORAL at 16:22

## 2025-02-11 RX ADMIN — AMOXICILLIN 500 MG: 500 CAPSULE ORAL at 00:06

## 2025-02-11 ASSESSMENT — PAIN - FUNCTIONAL ASSESSMENT
PAIN_FUNCTIONAL_ASSESSMENT: 0-10

## 2025-02-11 ASSESSMENT — PAIN SCALES - GENERAL
PAINLEVEL_OUTOF10: 0 - NO PAIN

## 2025-02-11 NOTE — CARE PLAN
The patient's goals for the shift include to keep my baby and I safe    The clinical goals for the shift include for patient to not have any s/s ptl or infection overnight    Patient did well overnight with no significant changes or complications. Vitals stable and patient is not complaining of any pain. Patient is not complaining of any cramping or kristina and she is not bleeding. Patient has not had any pads to assess overnight. Remains on PO abx. Resting at this time. Will continue to monitor for any changes.

## 2025-02-11 NOTE — PROGRESS NOTES
".ANTEPARTUM PROGRESS NOTE   2025, 12:46 PM     SUBJECTIVE:  No acute events overnight. Patient has no complaints this morning. Pt reports vaginal discharge but no significant leakage of fluid, denies painful contractions, VB, Reports normal fetal movement.     OBJECTIVE:   /66   Pulse 91   Temp 37 °C (98.6 °F) (Temporal)   Resp 18   Ht 1.727 m (5' 8\")   Wt 80.9 kg (178 lb 5.6 oz)   LMP 2024   SpO2 98%   BMI 27.12 kg/m²    Temp  Min: 36.3 °C (97.3 °F)  Max: 37.3 °C (99.1 °F)  Pulse  Min: 69  Max: 95  BP  Min: 108/66  Max: 111/65    General:  AAOx3, No acute distress  Cardiovascular: Warm and well perfused  Respiratory: Normal respiratory effort   Abdominal:  Soft, gravid, non-tender, no rebound or guarding, no palpable contractions   Extremities: Warm, well perfused,  edema, no calf tenderness       NST: Baseline 140/ + accels/ - decels/ moderate brianna  [place]: uterine irritability      Labs:   .No results found for this or any previous visit (from the past 24 hours).      ASSESSMENT AND PLAN:   24 y.o.  at 23w3d by 19.3wk US with suspected PPROM     Oligohydramnios, presumed PPROM  - Hx of oligo since first formal US on  and again on , JIM 4.2 cm. Amniosure positive at OSH prior to transfer. Speculum exam neg x3 on admission to [facility]. Actim Prom negative on   - Amnio dye test for evaluation of PPROM deferred given absence of pocket of fluid.  Discussed with patient to re-evalaute for possible amnio dye test in which she declines at this time and would like to continue management as presumed PPROM  - GC/CT, wet prep negative  - Latency antibiotics, Day 6  - S/p BMZ -  - S/p NICU consult   - Planning to continue inpatient management until 34.0 wga unless delivery indicated prior for fetal or maternal compromise      Fetal status  - NST AGA this morning, continue daily NSTs while admitted   - Ultrasound: EFW 336g on US 25, BREECH on   - S/p BMZ -  - S/p NICU " consult      Routine   - GBS collected  but negative for GBS erwin, recollected 2/10.   - TDAP not yet indicated  - 1hr not yet indicated  - BCM: to be discussed     Dispo: Continue inpatient admission until delivery at 34 wks or sooner for fetal or maternal indications      Concepción Browne MD , PGY-3   [facility]  Pager [phone]   Principal Problem:     premature rupture of membranes (PPROM) with unknown onset of labor ([facility])  Active Problems:    Oligohydramnios antepartum, second trimester, not applicable or unspecified fetus ([facility])    23 weeks gestation of pregnancy ([facility])

## 2025-02-12 LAB — GP B STREP GENITAL QL CULT: NORMAL

## 2025-02-12 PROCEDURE — 1220000001 HC OB SEMI-PRIVATE ROOM DAILY

## 2025-02-12 PROCEDURE — 59025 FETAL NON-STRESS TEST: CPT | Mod: GC

## 2025-02-12 PROCEDURE — 2500000001 HC RX 250 WO HCPCS SELF ADMINISTERED DRUGS (ALT 637 FOR MEDICARE OP)

## 2025-02-12 PROCEDURE — 59025 FETAL NON-STRESS TEST: CPT

## 2025-02-12 PROCEDURE — 99232 SBSQ HOSP IP/OBS MODERATE 35: CPT

## 2025-02-12 RX ADMIN — AMOXICILLIN 500 MG: 500 CAPSULE ORAL at 00:37

## 2025-02-12 RX ADMIN — AMOXICILLIN 500 MG: 500 CAPSULE ORAL at 08:17

## 2025-02-12 RX ADMIN — PRENATAL VIT W/ FE FUMARATE-FA TAB 27-0.8 MG 1 TABLET: 27-0.8 TAB at 08:17

## 2025-02-12 ASSESSMENT — PAIN SCALES - GENERAL
PAINLEVEL_OUTOF10: 0 - NO PAIN

## 2025-02-12 ASSESSMENT — PAIN - FUNCTIONAL ASSESSMENT
PAIN_FUNCTIONAL_ASSESSMENT: 0-10

## 2025-02-12 NOTE — CARE PLAN
The patient's goals for the shift include to maintain pregnancy and for baby to look good on the monitor    The clinical goals for the shift include for patient to not have any s/s ptl or infection overnight    Patient did well overnight with no significant changes or complications. Vitals stable and patient has not complained of any pain. Patient is not complaining of any cramping or kristina and she is not bleeding. Patient has not had any pads for me to assess overnight. Patient endorses positive fetal movement and is on the monitor at this time for her nst. Patient has one more dose of PO abx this morning. Will continue to monitor for any changes.

## 2025-02-12 NOTE — PROGRESS NOTES
Antepartum Progress Note    SUBJECTIVE: No acute events overnight. Patient mentions there was a lot more fetal movement this morning. Felt like the baby was flipping and kicking. Denies painful contractions, vaginal bleeding, or leakage of fluid. No other complaints this morning. Denies fever, chills, SOB.    OBJECTIVE:  Allergies:   Patient has no known allergies.    Last Vitals:  Temp Pulse Resp BP MAP Pulse Ox   36.8 °C (98.2 °F) 84 18 120/68 86 98 %     Vitals Min/Max Last 24 Hours:  Temp  Min: 36.3 °C (97.3 °F)  Max: 37 °C (98.6 °F)  Pulse  Min: 70  Max: 91  Resp  Min: 18  Max: 18  BP  Min: 104/67  Max: 120/68  MAP (mmHg)  Min: 80  Max: 92    Intake/Output:   No intake or output data in the 24 hours ending 25 0649    Physical Exam  General: AAOx3, no acute distress, resting in bed  CV: RRR, no murmurs rubs or gallops, warm and well perfused  Respiratory: Normal work of breathing on room air, no rales, rhonchi, or crackles on auscultation  Abdominal: soft, gravid, non-tender to palpation in all 4 quadrants and in the suprapubic area. No rebound or guarding. No palpable contractions.     NST: 130/mod/+accel/-decel   [place]: no contractions     Assessment/Plan   Tatianna Mojica is a 24 y.o.  at 23w5d by 19.3wk US with suspected PPROM.    Oligohydramnios, presumed PPROM  - Hx of oligo since first formal US on  and again on , JIM 4.2 cm. Amniosure positive at OSH prior to transfer. Speculum exam neg x3 on admission to [facility]. Actim Prom negative on   - Amnio dye test for evaluation of PPROM deferred given absence of pocket of fluid.  Discussed with patient to re-evalaute for possible amnio dye test in which she declines at this time and would like to continue management as presumed PPROM  - Planning for repeat SSUS on  to reevaluate JIM  - To receive final dose of latency antibiotics this morning   - S/p BMZ -  - S/p NICU consult   - Planning to continue inpatient management until  34.0 wga unless delivery indicated prior for fetal or maternal compromise     Fetal status  - NST AGA this morning, continue daily NSTs while admitted   - Ultrasound: EFW 336g on US 25, BREECH on   - S/p BMZ -  - S/p NICU consult      Routine   - GBS collected  but negative for GBS erwin, recollected 2/10, culture is pending  - TDAP not yet indicated  - 1hr not yet indicated  - BCM: to be discussed     Dispo: Continue inpatient admission until delivery at 34.0 wks or sooner for fetal or maternal indications    To be seen and d/w Dr. Arie Zuluaga MD, PGY-3   MFM    Principal Problem:     premature rupture of membranes (PPROM) with unknown onset of labor ([facility])  Active Problems:    Oligohydramnios antepartum, second trimester, not applicable or unspecified fetus ([facility])    23 weeks gestation of pregnancy ([facility])

## 2025-02-12 NOTE — CARE PLAN
The patient's goals for the shift include to maintain pregnancy and for baby to look good on the monitor    The clinical goals for the shift include for patient to not have any s/s ptl or infection overnight    Patient remained free from falls/injury throughout shift. VSS and no s/sx of infection at this time. Patient denies n/v, fever, or chills. FHR remained reassuring with spot check throughout shift. No changes in amniotic fluid throughout shift, and patient declines abd tenderness, ctx or cramping, and vaginal bleeding. Patient resting comfortably in bed and denies needs at this time.

## 2025-02-13 LAB
ABO GROUP (TYPE) IN BLOOD: NORMAL
ANTIBODY SCREEN: NORMAL
BLOOD EXPIRATION DATE: NORMAL
BLOOD EXPIRATION DATE: NORMAL
DISPENSE STATUS: NORMAL
DISPENSE STATUS: NORMAL
PRODUCT BLOOD TYPE: 5100
PRODUCT BLOOD TYPE: 5100
PRODUCT CODE: NORMAL
PRODUCT CODE: NORMAL
RH FACTOR (ANTIGEN D): NORMAL
UNIT ABO: NORMAL
UNIT ABO: NORMAL
UNIT NUMBER: NORMAL
UNIT NUMBER: NORMAL
UNIT RH: NORMAL
UNIT RH: NORMAL
UNIT VOLUME: 350
UNIT VOLUME: 350
XM INTEP: NORMAL
XM INTEP: NORMAL

## 2025-02-13 PROCEDURE — 59025 FETAL NON-STRESS TEST: CPT | Mod: GC

## 2025-02-13 PROCEDURE — 59025 FETAL NON-STRESS TEST: CPT

## 2025-02-13 PROCEDURE — 1220000001 HC OB SEMI-PRIVATE ROOM DAILY

## 2025-02-13 PROCEDURE — 86901 BLOOD TYPING SEROLOGIC RH(D): CPT

## 2025-02-13 PROCEDURE — 86923 COMPATIBILITY TEST ELECTRIC: CPT

## 2025-02-13 PROCEDURE — 36415 COLL VENOUS BLD VENIPUNCTURE: CPT

## 2025-02-13 PROCEDURE — 99232 SBSQ HOSP IP/OBS MODERATE 35: CPT

## 2025-02-13 PROCEDURE — 2500000001 HC RX 250 WO HCPCS SELF ADMINISTERED DRUGS (ALT 637 FOR MEDICARE OP)

## 2025-02-13 RX ADMIN — PRENATAL VIT W/ FE FUMARATE-FA TAB 27-0.8 MG 1 TABLET: 27-0.8 TAB at 07:52

## 2025-02-13 ASSESSMENT — PAIN SCALES - GENERAL
PAINLEVEL_OUTOF10: 0 - NO PAIN

## 2025-02-13 ASSESSMENT — PAIN - FUNCTIONAL ASSESSMENT
PAIN_FUNCTIONAL_ASSESSMENT: 0-10

## 2025-02-13 NOTE — PROGRESS NOTES
Antepartum Progress Note    SUBJECTIVE: No acute events overnight. Denies painful contractions, vaginal bleeding, decreased fetal movement or leakage of fluid. Denies fever, chills, sweats, N/V.     OBJECTIVE:    Last Vitals:  Temp Pulse Resp BP MAP Pulse Ox   36.8 °C (98.2 °F) 84 18 120/68 86 98 %     Vitals Min/Max Last 24 Hours:  Temp  Min: 36.3 °C (97.3 °F)  Max: 37 °C (98.6 °F)  Pulse  Min: 70  Max: 91  Resp  Min: 18  Max: 18  BP  Min: 104/67  Max: 120/68  MAP (mmHg)  Min: 80  Max: 92    Intake/Output:   No intake or output data in the 24 hours ending 25 0649    Physical Exam  General: no acute distress  CV: warm and well perfused  Respiratory: Normal work of breathing on room air  Abdominal: soft, gravid, non-tender to palpation    NST: 130/mod/+accel/-decel   [place]: no contractions     Assessment/Plan   Tatianna Mojica is a 24 y.o.  at 23w6d by 19.3wk US with suspected PPROM.    Oligohydramnios, presumed PPROM  - Hx of oligo since first formal US on  and again on , JIM 4.2 cm. Amniosure positive at OSH prior to transfer. Speculum exam neg x3 on admission to [facility]. Actim Prom negative on . Given high suspicion for PRROM, planning to continue management as such   - Planning for repeat SSUS on  to reevaluate JIM  - S/p latency antibiotics  - S/p BMZ -  - S/p NICU consult   - Planning to continue inpatient management until 34.0 wga unless delivery indicated prior for fetal or maternal compromise     Fetal status  - NST AGA this morning, continue daily NSTs while admitted   - Ultrasound: EFW 336g on US 25, BREECH on   - S/p BMZ -  - S/p NICU consult      Routine   - GBS negative, 2/10  - BCM: POPs      Dispo: Continue inpatient admission until delivery at 34.0 wks or sooner for fetal or maternal indications    To be seen and d/w Dr. Arie Zuluaga MD, PGY-3   MFM    Principal Problem:     premature rupture of membranes (PPROM) with unknown onset  of labor ([facility])  Active Problems:    Oligohydramnios antepartum, second trimester, not applicable or unspecified fetus ([facility])    23 weeks gestation of pregnancy ([facility])

## 2025-02-13 NOTE — CARE PLAN
VSS t/o shift. No s/sx infection. No ctx, cramping, LOF, or bleeding. FHR WDL during spot checks.      Problem: Antepartum  Goal: Maintain pregnancy as long as maternal and/or fetal condition is stable  Outcome: Progressing  Goal: Avoid/minimize constipation  Outcome: Progressing  Goal: No decrease in circulation/VTE  Outcome: Progressing  Goal: FHR remains reassuring  Outcome: Progressing  Goal: Minimize anxiety/maximize coping  Outcome: Progressing     Problem: Infection  Goal: Fever/diaphoresis will improve to <38.0 C  Outcome: Progressing  Goal: Wound will have less exudate and warmth  Outcome: Progressing  Goal: Improvement in s/sx of infection  Outcome: Progressing     Problem: Pain - Adult  Goal: Verbalizes/displays adequate comfort level or baseline comfort level  Outcome: Progressing     Problem: Safety - Adult  Goal: Free from fall injury  Outcome: Progressing     Problem:  Labor/Prolonged Premature Rupture of Membranes  Goal: Fewer then 4-6 ct per hour  Outcome: Progressing  Goal: No s/sx of IAI  Outcome: Progressing     Problem: Fall/Injury  Goal: Not fall by end of shift  Outcome: Progressing  Goal: Be free from injury by end of the shift  Outcome: Progressing  Goal: Verbalize understanding of personal risk factors for fall in the hospital  Outcome: Progressing  Goal: Verbalize understanding of risk factor reduction measures to prevent injury from fall in the home  Outcome: Progressing  Goal: Use assistive devices by end of the shift  Outcome: Progressing  Goal: Pace activities to prevent fatigue by end of the shift  Outcome: Progressing

## 2025-02-14 ENCOUNTER — APPOINTMENT (OUTPATIENT)
Dept: RADIOLOGY | Facility: HOSPITAL | Age: 25
End: 2025-02-14
Payer: COMMERCIAL

## 2025-02-14 PROCEDURE — 2500000001 HC RX 250 WO HCPCS SELF ADMINISTERED DRUGS (ALT 637 FOR MEDICARE OP)

## 2025-02-14 PROCEDURE — 99232 SBSQ HOSP IP/OBS MODERATE 35: CPT

## 2025-02-14 PROCEDURE — 76820 UMBILICAL ARTERY ECHO: CPT

## 2025-02-14 PROCEDURE — 76816 OB US FOLLOW-UP PER FETUS: CPT | Performed by: OBSTETRICS & GYNECOLOGY

## 2025-02-14 PROCEDURE — 76816 OB US FOLLOW-UP PER FETUS: CPT

## 2025-02-14 PROCEDURE — 1220000001 HC OB SEMI-PRIVATE ROOM DAILY

## 2025-02-14 PROCEDURE — 76820 UMBILICAL ARTERY ECHO: CPT | Performed by: OBSTETRICS & GYNECOLOGY

## 2025-02-14 RX ADMIN — PRENATAL VIT W/ FE FUMARATE-FA TAB 27-0.8 MG 1 TABLET: 27-0.8 TAB at 08:50

## 2025-02-14 ASSESSMENT — PAIN - FUNCTIONAL ASSESSMENT
PAIN_FUNCTIONAL_ASSESSMENT: 0-10

## 2025-02-14 ASSESSMENT — PAIN SCALES - GENERAL
PAINLEVEL_OUTOF10: 0 - NO PAIN

## 2025-02-14 NOTE — PROGRESS NOTES
Antepartum Progress Note    SUBJECTIVE:   No acute events overnight. Patient reports doing well this morning and slept well. Denies any painful contractions or cramps, vaginal bleeding, leakage of fluid; endorses good fetal movement. The patient also denies fevers, chills, night sweats, or nausea/vomiting.     OBJECTIVE:    Vitals:    25 0006   BP: 116/63   Pulse: 77   Resp: 18   Temp: 37.3 °C (99.1 °F)   SpO2: 99%        No intake or output data in the 24 hours ending 25 0755     Physical Exam  General: no acute distress  CV: warm and well perfused  Respiratory: Normal work of breathing on room air  Abdominal: soft, gravid, non-tender to palpation    NST: 130/mod/+accel/+variables decel   [place]: no contractions     Assessment/Plan   Tatianna Mojica is a 24 y.o.  at 24w0d by 19.3wk US with suspected PPROM.    Oligohydramnios, presumed PPROM  - Hx of oligo since first formal US on  and again on , JIM 4.2 cm. Amniosure positive at OSH prior to transfer. Speculum exam neg x3 on admission to [facility]. Actim Prom negative on . Given high suspicion for PRROM, planning to continue management as such   - Planning for repeat SSUS to reevaluate JIM   - S/p latency antibiotics  - S/p BMZ -/  - S/p NICU consult   - Planning to continue inpatient management until 34.0 wga unless delivery indicated prior for fetal or maternal compromise     Fetal status  - NST AGA this morning, continue daily NSTs while admitted   - Ultrasound: EFW 336g on US 25  - S/p BMZ -  - S/p NICU consult      Routine   - GBS negative, 2/10  - BCM: POPs   - Breech presentation on   - Will plan for 1 hr gtt next week     Dispo: Continue inpatient admission until delivery at 34.0 wks or sooner for fetal or maternal indications    To be seen and d/w Dr. Elise Burch M3    I saw and evaluated the patient with the medical student. I have made any necessary modifications within the body of  text.  Joanna Zuluaga MD, PGY-3   [facility], [id number]    Principal Problem:     premature rupture of membranes (PPROM) with unknown onset of labor ([facility])  Active Problems:    Oligohydramnios antepartum, second trimester, not applicable or unspecified fetus ([facility])    23 weeks gestation of pregnancy ([facility])

## 2025-02-15 PROCEDURE — 59025 FETAL NON-STRESS TEST: CPT

## 2025-02-15 PROCEDURE — 1220000001 HC OB SEMI-PRIVATE ROOM DAILY

## 2025-02-15 PROCEDURE — 99232 SBSQ HOSP IP/OBS MODERATE 35: CPT

## 2025-02-15 PROCEDURE — 59025 FETAL NON-STRESS TEST: CPT | Mod: GC

## 2025-02-15 PROCEDURE — 2500000001 HC RX 250 WO HCPCS SELF ADMINISTERED DRUGS (ALT 637 FOR MEDICARE OP)

## 2025-02-15 RX ORDER — ACETAMINOPHEN 325 MG/1
650 TABLET ORAL EVERY 6 HOURS PRN
Status: DISCONTINUED | OUTPATIENT
Start: 2025-02-15 | End: 2025-04-23

## 2025-02-15 RX ORDER — CALCIUM CARBONATE 200(500)MG
500 TABLET,CHEWABLE ORAL 4 TIMES DAILY PRN
Status: DISCONTINUED | OUTPATIENT
Start: 2025-02-15 | End: 2025-04-23

## 2025-02-15 RX ADMIN — PRENATAL VIT W/ FE FUMARATE-FA TAB 27-0.8 MG 1 TABLET: 27-0.8 TAB at 09:07

## 2025-02-15 RX ADMIN — CALCIUM CARBONATE (ANTACID) CHEW TAB 500 MG 500 MG: 500 CHEW TAB at 23:50

## 2025-02-15 RX ADMIN — ACETAMINOPHEN 650 MG: 325 TABLET, FILM COATED ORAL at 20:00

## 2025-02-15 ASSESSMENT — PAIN SCALES - GENERAL
PAINLEVEL_OUTOF10: 0 - NO PAIN
PAINLEVEL_OUTOF10: 0 - NO PAIN
PAINLEVEL_OUTOF10: 2
PAINLEVEL_OUTOF10: 1
PAINLEVEL_OUTOF10: 0 - NO PAIN
PAINLEVEL_OUTOF10: 2

## 2025-02-15 ASSESSMENT — PAIN DESCRIPTION - DESCRIPTORS
DESCRIPTORS: ACHING

## 2025-02-15 ASSESSMENT — PAIN - FUNCTIONAL ASSESSMENT
PAIN_FUNCTIONAL_ASSESSMENT: 0-10

## 2025-02-15 ASSESSMENT — PAIN DESCRIPTION - LOCATION: LOCATION: HEAD

## 2025-02-15 NOTE — PROGRESS NOTES
"ANTEPARTUM PROGRESS NOTE   2/15/2025, 1:46 AM     SUBJECTIVE:  No acute events overnight. Patient has no complaints this morning. Denies painful contractions, VB, LOF. Reports normal fetal movement.     OBJECTIVE:   /63   Pulse 90   Temp 36.9 °C (98.4 °F) (Temporal)   Resp 16   Ht 1.727 m (5' 8\")   Wt 80.9 kg (178 lb 5.6 oz)   LMP 2024   SpO2 98%   BMI 27.12 kg/m²    Temp  Min: 36.3 °C (97.3 °F)  Max: 37.1 °C (98.8 °F)  Pulse  Min: 68  Max: 96  BP  Min: 101/63  Max: 134/79    General:  AAOx3, No acute distress  Cardiovascular: Warm and well perfused  Respiratory: Normal respiratory effort   Abdominal:  Soft, gravid, non-tender, no rebound or guarding, no palpable contractions   Extremities: Warm, well perfused,     NST: Baseline 135/ mod variability/ + accels/small variable decels  [place]: quiet     Labs:   No results found for: \"WBC\", \"HGB\", \"HCT\", \"PLT\"    ASSESSMENT AND PLAN:   Tatianna Mojica is a 24 y.o.  at 24w1d by 19.3wk US with suspected PPROM.     Oligohydramnios, presumed PPROM  - Hx of oligo since first formal US on  and again on , JIM 4.2 cm. Amniosure positive at OSH prior to transfer. Speculum exam neg x3 on admission to [facility]. Actim Prom negative on . Given high suspicion for PRROM, planning to continue management as such   - Planning for repeat SSUS to reevaluate JIM   - S/p latency antibiotics  - S/p BMZ -  - S/p NICU consult   - Planning to continue inpatient management until 34.0 wga unless delivery indicated prior for fetal or maternal compromise     Fetal status  - NST AGA this morning, continue daily NSTs while admitted   - Ultrasound: EFW 336g on US 25  - S/p BMZ -  - S/p NICU consult      Routine   - GBS negative, 2/10  - BCM: POPs   - Breech presentation on   - Will plan for 1 hr gtt next week     Dispo: Continue inpatient admission until delivery at 34.0 wks or sooner for fetal or maternal indications       Pt seen and discussed " with [facility] Attending, Dr. Olivares.     Amy Funes MD   [facility]  Pager [phone]     Principal Problem:     premature rupture of membranes (PPROM) with unknown onset of labor ([facility])  Active Problems:    Oligohydramnios antepartum, second trimester, not applicable or unspecified fetus ([facility])    23 weeks gestation of pregnancy ([facility])

## 2025-02-16 VITALS
SYSTOLIC BLOOD PRESSURE: 117 MMHG | WEIGHT: 178.35 LBS | HEIGHT: 68 IN | TEMPERATURE: 98.4 F | DIASTOLIC BLOOD PRESSURE: 65 MMHG | OXYGEN SATURATION: 97 % | BODY MASS INDEX: 27.03 KG/M2 | HEART RATE: 94 BPM | RESPIRATION RATE: 16 BRPM

## 2025-02-16 LAB
ABO GROUP (TYPE) IN BLOOD: NORMAL
ANTIBODY SCREEN: NORMAL
BLOOD EXPIRATION DATE: NORMAL
DISPENSE STATUS: NORMAL
PRODUCT BLOOD TYPE: 5100
PRODUCT CODE: NORMAL
RH FACTOR (ANTIGEN D): NORMAL
UNIT ABO: NORMAL
UNIT NUMBER: NORMAL
UNIT RH: NORMAL
UNIT VOLUME: 350
XM INTEP: NORMAL

## 2025-02-16 PROCEDURE — 86923 COMPATIBILITY TEST ELECTRIC: CPT

## 2025-02-16 PROCEDURE — 2500000001 HC RX 250 WO HCPCS SELF ADMINISTERED DRUGS (ALT 637 FOR MEDICARE OP)

## 2025-02-16 PROCEDURE — 59025 FETAL NON-STRESS TEST: CPT | Mod: GC

## 2025-02-16 PROCEDURE — 59025 FETAL NON-STRESS TEST: CPT

## 2025-02-16 PROCEDURE — 86901 BLOOD TYPING SEROLOGIC RH(D): CPT

## 2025-02-16 PROCEDURE — 99232 SBSQ HOSP IP/OBS MODERATE 35: CPT

## 2025-02-16 PROCEDURE — 36415 COLL VENOUS BLD VENIPUNCTURE: CPT

## 2025-02-16 PROCEDURE — 1220000001 HC OB SEMI-PRIVATE ROOM DAILY

## 2025-02-16 RX ADMIN — PRENATAL VIT W/ FE FUMARATE-FA TAB 27-0.8 MG 1 TABLET: 27-0.8 TAB at 08:58

## 2025-02-16 ASSESSMENT — PAIN - FUNCTIONAL ASSESSMENT
PAIN_FUNCTIONAL_ASSESSMENT: 0-10

## 2025-02-16 ASSESSMENT — PAIN SCALES - GENERAL
PAINLEVEL_OUTOF10: 0 - NO PAIN

## 2025-02-16 NOTE — CARE PLAN
The patient's goals for the shift include remain healthy and stable.    The clinical goals for the shift include no s/sx of infection    Over the shift, the patient continued to make progress toward the following goals. Patient remained free from falls/injury throughout shift. VSS and no s/sx of infection at this time. Patient denies n/v, fever, or chills. FHR remained reassuring with spot check throughout shift. Per patient no amniotic fluid on pad throughout shift, and patient declines abd tenderness, ctx or cramping, and vaginal bleeding. Patient resting comfortably in bed and denies needs at this time.

## 2025-02-16 NOTE — PROGRESS NOTES
"ANTEPARTUM PROGRESS NOTE   2025, 5:52 AM     SUBJECTIVE:  No acute events overnight. Patient has no complaints this morning. Denies fevers/chills, painful contractions, VB, LOF. Reports normal fetal movement.     OBJECTIVE:   /63   Pulse 81   Temp 36.5 °C (97.7 °F) (Temporal)   Resp 16   Ht 1.727 m (5' 8\")   Wt 80.9 kg (178 lb 5.6 oz)   LMP 2024   SpO2 97%   BMI 27.12 kg/m²    Temp  Min: 36.5 °C (97.7 °F)  Max: 36.9 °C (98.4 °F)  Pulse  Min: 81  Max: 96  BP  Min: 100/59  Max: 110/63    General:  AAOx3, No acute distress  Cardiovascular: Warm and well perfused  Respiratory: Normal respiratory effort   Abdominal:  Soft, gravid, non-tender, no rebound or guarding, no palpable contractions   Extremities: Warm, well perfused,     NST: Baseline 130/ mod variability/ + accels/small variable decels  [place]: quiet     Labs:   No results found for: \"WBC\", \"HGB\", \"HCT\", \"PLT\"    ASSESSMENT AND PLAN:   Tatianna Mojica is a 24 y.o.  at 24w2d by 19.3wk US with suspected PPROM.     Oligohydramnios, presumed PPROM  - Hx of oligo since first formal US on  and again on , JIM 4.2 cm. Amniosure positive at OSH prior to transfer. Speculum exam neg x3 on admission to [facility]. Actim Prom negative on . Given high suspicion for PRROM, planning to continue management as such   - Repeat SSUS  with consistent oligohydramnios, last JIM 3.7  - S/p latency antibiotics  - S/p BMZ -  - S/p NICU consult   - Planning to continue inpatient management until 34.0 wga unless delivery indicated prior for fetal or maternal compromise     Fetal status  - NST AGA this morning, continue daily NSTs while admitted   - Last Ultrasound: EFW 557g, 10%, AC 18%, Breech  - S/p BMZ -  - S/p NICU consult      Routine   - GBS negative, 2/10  - BCM: POPs   - Will plan for 1 hr gtt next week     Dispo: Continue inpatient admission until delivery at 34.0 wks or sooner for fetal or maternal indications     Pt seen " and discussed with [facility] Attending, Dr. Olivares.     Laney Huff MD   PGY-2, [facility]  Pager [phone]     Principal Problem:     premature rupture of membranes (PPROM) with unknown onset of labor ([facility])  Active Problems:    Oligohydramnios antepartum, second trimester, not applicable or unspecified fetus ([facility])    23 weeks gestation of pregnancy ([facility])

## 2025-02-17 PROCEDURE — 59025 FETAL NON-STRESS TEST: CPT | Mod: GC

## 2025-02-17 PROCEDURE — 1220000001 HC OB SEMI-PRIVATE ROOM DAILY

## 2025-02-17 PROCEDURE — 59025 FETAL NON-STRESS TEST: CPT

## 2025-02-17 PROCEDURE — 2500000001 HC RX 250 WO HCPCS SELF ADMINISTERED DRUGS (ALT 637 FOR MEDICARE OP)

## 2025-02-17 PROCEDURE — 99232 SBSQ HOSP IP/OBS MODERATE 35: CPT

## 2025-02-17 RX ADMIN — ACETAMINOPHEN 650 MG: 325 TABLET, FILM COATED ORAL at 23:40

## 2025-02-17 RX ADMIN — PRENATAL VIT W/ FE FUMARATE-FA TAB 27-0.8 MG 1 TABLET: 27-0.8 TAB at 08:47

## 2025-02-17 ASSESSMENT — PAIN DESCRIPTION - LOCATION: LOCATION: HEAD

## 2025-02-17 ASSESSMENT — PAIN - FUNCTIONAL ASSESSMENT
PAIN_FUNCTIONAL_ASSESSMENT: 0-10

## 2025-02-17 ASSESSMENT — PAIN SCALES - GENERAL
PAINLEVEL_OUTOF10: 2
PAINLEVEL_OUTOF10: 0 - NO PAIN

## 2025-02-17 ASSESSMENT — PAIN DESCRIPTION - ORIENTATION: ORIENTATION: POSTERIOR

## 2025-02-17 NOTE — CARE PLAN
The patient's goals for the shift include remain healthy and stable.    The clinical goals for the shift include no s/s of infection , reassuring fhr    The clinical goals for the shift include no s/sx of infection      Patient remained free from falls/injury throughout shift. VSS and no s/sx of infection at this time. Patient denies n/v, fever, or chills. FHR remained reassuring with spot check throughout shift,  NST reactive. Per patient no amniotic fluid on pad throughout shift, and patient declines abd  ctx or cramping, and vaginal bleeding. Patient resting comfortably in bed and denies needs at this time.

## 2025-02-17 NOTE — PROGRESS NOTES
"ANTEPARTUM PROGRESS NOTE   2025, 7:42 AM     SUBJECTIVE:  No acute events overnight. Patient has no complaints this morning. Denies fevers/chills, painful contractions, VB, LOF. Reports normal fetal movement.     OBJECTIVE:   /61 (BP Location: Left arm, Patient Position: Sitting)   Pulse 91   Temp 37.6 °C (99.7 °F) (Temporal)   Resp 18   Ht 1.727 m (5' 8\")   Wt 80.9 kg (178 lb 5.6 oz)   LMP 2024   SpO2 98%   BMI 27.12 kg/m²    Temp  Min: 36.6 °C (97.9 °F)  Max: 37.6 °C (99.7 °F)  Pulse  Min: 63  Max: 94  BP  Min: 104/61  Max: 117/65    General:  AAOx3, No acute distress  Cardiovascular: Warm and well perfused  Respiratory: Normal respiratory effort   Abdominal:  Soft, gravid, non-tender, no rebound or guarding, no palpable contractions   Extremities: Warm, well perfused,     NST: Baseline 130/ mod variability/ + accels/small variable decels  [place]: quiet     Labs:   No results found for: \"WBC\", \"HGB\", \"HCT\", \"PLT\"    ASSESSMENT AND PLAN:   Tatianna Mojica is a 24 y.o.  at 24w3d by 19.3wk US with suspected PPROM.     Oligohydramnios, presumed PPROM  - Hx of oligo since first formal US on  and again on , JIM 4.2 cm. Amniosure positive at OSH prior to transfer. Speculum exam neg x3 on admission to [facility]. Actim Prom negative on . Given high suspicion for PRROM, planning to continue management as such   - Repeat SSUS  with consistent oligohydramnios, last JIM 3.7  - S/p latency antibiotics  - S/p BMZ -  - S/p NICU consult   - Planning to continue inpatient management until 34.0 wga unless delivery indicated prior for fetal or maternal compromise     Fetal status  - NST AGA this morning, continue daily NSTs while admitted   - Last Ultrasound: EFW 557g, 10%, AC 18%, Breech  - S/p BMZ -  - S/p NICU consult      Routine   - GBS negative, 2/10  - BCM: POPs   - Will plan for 1 hr gtt next week     Dispo: Continue inpatient admission until delivery at 34.0 wks or " sooner for fetal or maternal indications     Pt seen and discussed with [facility] Attending, Dr. Olivares.     Laney Huff MD   PGY-2, [facility]  Pager [phone]     Principal Problem:     premature rupture of membranes (PPROM) with unknown onset of labor ([facility])  Active Problems:    Oligohydramnios antepartum, second trimester, not applicable or unspecified fetus ([facility])    23 weeks gestation of pregnancy ([facility])

## 2025-02-18 PROBLEM — Z3A.24 24 WEEKS GESTATION OF PREGNANCY (HHS-HCC): Status: ACTIVE | Noted: 2025-02-06

## 2025-02-18 PROCEDURE — 1220000001 HC OB SEMI-PRIVATE ROOM DAILY

## 2025-02-18 PROCEDURE — 59025 FETAL NON-STRESS TEST: CPT | Mod: GC

## 2025-02-18 PROCEDURE — 2500000001 HC RX 250 WO HCPCS SELF ADMINISTERED DRUGS (ALT 637 FOR MEDICARE OP)

## 2025-02-18 PROCEDURE — 87205 SMEAR GRAM STAIN: CPT

## 2025-02-18 PROCEDURE — 59025 FETAL NON-STRESS TEST: CPT

## 2025-02-18 PROCEDURE — 99232 SBSQ HOSP IP/OBS MODERATE 35: CPT

## 2025-02-18 RX ADMIN — PRENATAL VIT W/ FE FUMARATE-FA TAB 27-0.8 MG 1 TABLET: 27-0.8 TAB at 08:39

## 2025-02-18 ASSESSMENT — PAIN SCALES - GENERAL
PAINLEVEL_OUTOF10: 0 - NO PAIN

## 2025-02-18 ASSESSMENT — PAIN - FUNCTIONAL ASSESSMENT
PAIN_FUNCTIONAL_ASSESSMENT: 0-10

## 2025-02-18 NOTE — PROGRESS NOTES
"ANTEPARTUM PROGRESS NOTE   2025, 8:00 AM     SUBJECTIVE:  No acute events overnight. Patient has no complaints this morning. Denies fevers/chills, painful contractions, VB, LOF. Reports normal fetal movement.     OBJECTIVE:   /61   Pulse 82   Temp 36.5 °C (97.7 °F) (Temporal)   Resp 16   Ht 1.727 m (5' 8\")   Wt 80.9 kg (178 lb 5.6 oz)   LMP 2024   SpO2 98%   BMI 27.12 kg/m²    Temp  Min: 36.2 °C (97.2 °F)  Max: 37.1 °C (98.8 °F)  Pulse  Min: 74  Max: 93  BP  Min: 105/61  Max: 108/70    General:  AAOx3, No acute distress  Cardiovascular: Warm and well perfused  Respiratory: Normal respiratory effort on RA   Abdominal:  Soft, gravid, non-tender, no rebound or guarding, no palpable contractions   Extremities: Warm, well perfused,     NST: Baseline 135/ mod variability/ + accels/small variable decels  [place]: quiet     ASSESSMENT AND PLAN:   Tatianna Mojica is a 24 y.o.  at 24w4d by 19.3wk US with suspected PPROM.     Oligohydramnios, presumed PPROM  - Hx of oligo since first formal US on  and again on , JIM 4.2 cm. Amniosure positive at OSH prior to transfer. Speculum exam neg x3 on admission to [facility]. Actim Prom negative on . Given high suspicion for PRROM, planning to continue management as such   - Repeat SSUS  with consistent oligohydramnios, last JIM 3.7  - S/p latency antibiotics  - S/p BMZ -  - S/p NICU consult   - Planning to continue inpatient management until 34.0 wga unless delivery indicated prior for fetal or maternal compromise     Fetal status  - NST AGA this morning, continue daily NSTs while admitted   - Last Ultrasound: EFW 557g, 10%, AC 18%, Breech     Routine   - Vaginal irritation: wet prep collected and pending   - GBS negative, 2/10  - BCM: POPs   - Will plan for 1 hr gtt next week     Dispo: Continue inpatient admission until delivery at 34.0 wks or sooner for fetal or maternal indications     Pt seen and discussed with MFM Attending, Dr." Elise.     Joanna Zuluaga MD   PGY-3, [facility]  Pager [phone]     Principal Problem:     premature rupture of membranes (PPROM) with unknown onset of labor ([facility])  Active Problems:    Oligohydramnios antepartum, second trimester, not applicable or unspecified fetus ([facility])    24 weeks gestation of pregnancy ([facility])

## 2025-02-19 LAB
ABO GROUP (TYPE) IN BLOOD: NORMAL
ANTIBODY SCREEN: NORMAL
BLOOD EXPIRATION DATE: NORMAL
CLUE CELLS VAG LPF-#/AREA: ABNORMAL /[LPF]
DISPENSE STATUS: NORMAL
NUGENT SCORE: 0
PRODUCT BLOOD TYPE: 5100
PRODUCT CODE: NORMAL
RH FACTOR (ANTIGEN D): NORMAL
UNIT ABO: NORMAL
UNIT NUMBER: NORMAL
UNIT RH: NORMAL
UNIT VOLUME: 293
XM INTEP: NORMAL
YEAST VAG WET PREP-#/AREA: PRESENT

## 2025-02-19 PROCEDURE — 99232 SBSQ HOSP IP/OBS MODERATE 35: CPT

## 2025-02-19 PROCEDURE — 59025 FETAL NON-STRESS TEST: CPT

## 2025-02-19 PROCEDURE — 2500000001 HC RX 250 WO HCPCS SELF ADMINISTERED DRUGS (ALT 637 FOR MEDICARE OP)

## 2025-02-19 PROCEDURE — 86901 BLOOD TYPING SEROLOGIC RH(D): CPT

## 2025-02-19 PROCEDURE — 59025 FETAL NON-STRESS TEST: CPT | Mod: GC

## 2025-02-19 PROCEDURE — 1220000001 HC OB SEMI-PRIVATE ROOM DAILY

## 2025-02-19 PROCEDURE — 36415 COLL VENOUS BLD VENIPUNCTURE: CPT

## 2025-02-19 RX ORDER — FLUCONAZOLE 150 MG/1
150 TABLET ORAL ONCE
Status: COMPLETED | OUTPATIENT
Start: 2025-02-19 | End: 2025-02-19

## 2025-02-19 RX ADMIN — PRENATAL VIT W/ FE FUMARATE-FA TAB 27-0.8 MG 1 TABLET: 27-0.8 TAB at 08:20

## 2025-02-19 RX ADMIN — FLUCONAZOLE 150 MG: 150 TABLET ORAL at 15:03

## 2025-02-19 ASSESSMENT — PAIN SCALES - GENERAL
PAINLEVEL_OUTOF10: 0 - NO PAIN

## 2025-02-19 ASSESSMENT — PAIN - FUNCTIONAL ASSESSMENT
PAIN_FUNCTIONAL_ASSESSMENT: 0-10

## 2025-02-19 NOTE — PROGRESS NOTES
"ANTEPARTUM PROGRESS NOTE   2025, 6:46 AM     SUBJECTIVE:  No acute events overnight. Patient has no complaints this morning. Denies fevers/chills, painful contractions, VB, LOF. Reports normal fetal movement.     OBJECTIVE:   /73   Pulse 87   Temp 36.7 °C (98.1 °F) (Temporal)   Resp 18   Ht 1.727 m (5' 8\")   Wt 80.9 kg (178 lb 5.6 oz)   LMP 2024   SpO2 99%   BMI 27.12 kg/m²    Temp  Min: 36.1 °C (97 °F)  Max: 36.9 °C (98.4 °F)  Pulse  Min: 81  Max: 93  BP  Min: 104/70  Max: 121/73    General:  AAOx3, No acute distress  Cardiovascular: Warm and well perfused  Respiratory: Normal respiratory effort on RA   Abdominal:  Soft, gravid, non-tender, no rebound or guarding, no palpable contractions   Extremities: Warm, well perfused,     NST: Baseline 13/ mod variability/ + accels/small variable decel  [place]: quiet     ASSESSMENT AND PLAN:   Tatianna Mojica is a 24 y.o.  at 24w5d by 19.3wk US with suspected PPROM.     Oligohydramnios, presumed PPROM  - Hx of oligo since first formal US on  and again on , JIM 4.2 cm. Amniosure positive at OSH prior to transfer. Speculum exam neg x3 on admission to [facility]. Actim Prom negative on . Given high suspicion for PRROM, planning to continue management as such   - Repeat SSUS  with consistent oligohydramnios, last JIM 3.7  - S/p latency antibiotics  - S/p BMZ -  - S/p NICU consult   - Planning to continue inpatient management until 34.0 wga unless delivery indicated prior for fetal or maternal compromise     Fetal status  - NST AGA this morning, continue daily NSTs while admitted   - Last Ultrasound:  EFW 557g, 10%, AC 18%, Breech     Routine   - Vaginal irritation: wet prep collected and pending   - GBS negative, 2/10  - BCM: POPs   - Will plan for 1 hr gtt next week     Dispo: Continue inpatient admission until delivery at 34.0 wks or sooner for fetal or maternal indications     Pt seen and discussed with MFM Attending, Dr." Elise.     Madelaine Chapin MD PGY-1   PGY-1, [facility]  Pager [phone]     Principal Problem:     premature rupture of membranes (PPROM) with unknown onset of labor ([facility])  Active Problems:    Oligohydramnios antepartum, second trimester, not applicable or unspecified fetus ([facility])    24 weeks gestation of pregnancy ([facility])

## 2025-02-19 NOTE — CARE PLAN
Problem: Antepartum  Goal: Maintain pregnancy as long as maternal and/or fetal condition is stable  Outcome: Progressing     Problem: Antepartum  Goal: FHR remains reassuring  Outcome: Progressing     Problem: Safety - Adult  Goal: Free from fall injury  Outcome: Progressing     Problem: Fall/Injury  Goal: Be free from injury by end of the shift  Outcome: Progressing     Problem:  Labor/Prolonged Premature Rupture of Membranes  Goal: No s/sx of IAI  Outcome: Progressing     The patient's goals for the shift include healthy mom; healthy baby    The clinical goals for the shift include no s/sx of infection

## 2025-02-20 PROCEDURE — 99232 SBSQ HOSP IP/OBS MODERATE 35: CPT

## 2025-02-20 PROCEDURE — 2500000001 HC RX 250 WO HCPCS SELF ADMINISTERED DRUGS (ALT 637 FOR MEDICARE OP)

## 2025-02-20 PROCEDURE — 59025 FETAL NON-STRESS TEST: CPT

## 2025-02-20 PROCEDURE — 59025 FETAL NON-STRESS TEST: CPT | Mod: GC

## 2025-02-20 PROCEDURE — 1220000001 HC OB SEMI-PRIVATE ROOM DAILY

## 2025-02-20 RX ADMIN — PRENATAL VIT W/ FE FUMARATE-FA TAB 27-0.8 MG 1 TABLET: 27-0.8 TAB at 08:18

## 2025-02-20 ASSESSMENT — PAIN - FUNCTIONAL ASSESSMENT
PAIN_FUNCTIONAL_ASSESSMENT: 0-10

## 2025-02-20 ASSESSMENT — PAIN SCALES - GENERAL
PAINLEVEL_OUTOF10: 0 - NO PAIN

## 2025-02-20 NOTE — CARE PLAN
The patient's goals for the shift include healthy mom; healthy baby    The clinical goals for the shift include will remain pregnant, no s/sx of PTL or infection      Problem: Antepartum  Goal: Maintain pregnancy as long as maternal and/or fetal condition is stable  Outcome: Progressing  Goal: Avoid/minimize constipation  Outcome: Progressing  Goal: No decrease in circulation/VTE  Outcome: Progressing  Goal: FHR remains reassuring  Outcome: Progressing  Goal: Minimize anxiety/maximize coping  Outcome: Progressing     Problem: Infection  Goal: Fever/diaphoresis will improve to <38.0 C  Outcome: Progressing  Goal: Wound will have less exudate and warmth  Outcome: Progressing  Goal: Improvement in s/sx of infection  Outcome: Progressing     Problem: Pain - Adult  Goal: Verbalizes/displays adequate comfort level or baseline comfort level  Outcome: Progressing     Problem: Safety - Adult  Goal: Free from fall injury  Outcome: Progressing     Problem:  Labor/Prolonged Premature Rupture of Membranes  Goal: Fewer then 4-6 ct per hour  Outcome: Progressing  Goal: No s/sx of IAI  Outcome: Progressing     Problem: Fall/Injury  Goal: Not fall by end of shift  Outcome: Progressing  Goal: Be free from injury by end of the shift  Outcome: Progressing  Goal: Verbalize understanding of personal risk factors for fall in the hospital  Outcome: Progressing  Goal: Verbalize understanding of risk factor reduction measures to prevent injury from fall in the home  Outcome: Progressing  Goal: Use assistive devices by end of the shift  Outcome: Progressing  Goal: Pace activities to prevent fatigue by end of the shift  Outcome: Progressing

## 2025-02-20 NOTE — PROGRESS NOTES
"ANTEPARTUM PROGRESS NOTE   2025, 7:13 AM     SUBJECTIVE:  No acute events overnight. Patient has no complaints this morning. Denies fevers/chills, painful contractions, VB, LOF. Reports normal fetal movement.     OBJECTIVE:   /68   Pulse 77   Temp 36.8 °C (98.2 °F) (Temporal)   Resp 16   Ht 1.727 m (5' 8\")   Wt 80.9 kg (178 lb 5.6 oz)   LMP 2024   SpO2 98%   BMI 27.12 kg/m²    Temp  Min: 36.2 °C (97.2 °F)  Max: 36.8 °C (98.2 °F)  Pulse  Min: 77  Max: 92  BP  Min: 101/65  Max: 109/68    General:  AAOx3, No acute distress  Cardiovascular: Warm and well perfused  Respiratory: Normal respiratory effort on RA   Abdominal:  Soft, gravid, non-tender, no rebound or guarding, no palpable contractions   Extremities: Warm, well perfused,     NST: Baseline 135/ mod variability/ + accels/small variable decel  [place]: quiet     ASSESSMENT AND PLAN:   Tatianna Mojica is a 24 y.o.  at 24w6d by 19.3wk US with suspected PPROM.     Oligohydramnios, presumed PPROM  - Hx of oligo since first formal US on  and again on , JIM 4.2 cm. Amniosure positive at OSH prior to transfer. Speculum exam neg x3 on admission to [facility]. Actim Prom negative on . Given high suspicion for PRROM, planning to continue management as such   - Repeat SSUS  with consistent oligohydramnios, last JIM 3.7  - S/p latency antibiotics  - S/p BMZ -  - S/p NICU consult   - Will address further genetic testing today  - Planning to continue inpatient management until 34.0 wga unless delivery indicated prior for fetal or maternal compromise     Fetal status  - NST AGA this morning, continue daily NSTs while admitted   - Last Ultrasound:  EFW 557g, 10%, AC 18%, Breech     Routine   - Vaginal irritation: wet prep collected and pending   - GBS negative, 2/10  - BCM: POPs   - Will plan for 1 hr gtt next week     Dispo: Continue inpatient admission until delivery at 34.0 wks or sooner for fetal or maternal indications   "   Pt seen and discussed with [facility] Attending, Dr. Olivares.     Laney Huff MD   PGY-2, [facility]  Pager [phone]     Principal Problem:     premature rupture of membranes (PPROM) with unknown onset of labor ([facility])  Active Problems:    Oligohydramnios antepartum, second trimester, not applicable or unspecified fetus ([facility])    24 weeks gestation of pregnancy ([facility])

## 2025-02-21 PROCEDURE — 2500000001 HC RX 250 WO HCPCS SELF ADMINISTERED DRUGS (ALT 637 FOR MEDICARE OP)

## 2025-02-21 PROCEDURE — 1220000001 HC OB SEMI-PRIVATE ROOM DAILY

## 2025-02-21 PROCEDURE — 59025 FETAL NON-STRESS TEST: CPT

## 2025-02-21 PROCEDURE — 59025 FETAL NON-STRESS TEST: CPT | Mod: GC

## 2025-02-21 PROCEDURE — 99232 SBSQ HOSP IP/OBS MODERATE 35: CPT

## 2025-02-21 RX ADMIN — PRENATAL VIT W/ FE FUMARATE-FA TAB 27-0.8 MG 1 TABLET: 27-0.8 TAB at 09:17

## 2025-02-21 ASSESSMENT — PAIN SCALES - GENERAL
PAINLEVEL_OUTOF10: 0 - NO PAIN

## 2025-02-21 ASSESSMENT — PAIN - FUNCTIONAL ASSESSMENT
PAIN_FUNCTIONAL_ASSESSMENT: 0-10

## 2025-02-21 NOTE — PROGRESS NOTES
"ANTEPARTUM PROGRESS NOTE   2025, 6:30 AM     SUBJECTIVE:  No acute events overnight. Patient has no complaints this morning. Denies fevers/chills, painful contractions, VB, LOF. Reports normal fetal movement.     OBJECTIVE:   /78   Pulse 93   Temp 37.1 °C (98.8 °F) (Temporal)   Resp 18   Ht 1.727 m (5' 8\")   Wt 80.9 kg (178 lb 5.6 oz)   LMP 2024   SpO2 98%   BMI 27.12 kg/m²    Temp  Min: 36.6 °C (97.9 °F)  Max: 37.1 °C (98.8 °F)  Pulse  Min: 76  Max: 93  BP  Min: 108/72  Max: 119/78    General:  AAOx3, No acute distress  Cardiovascular: Warm and well perfused  Respiratory: Normal respiratory effort on RA   Abdominal:  Soft, gravid, non-tender, no rebound or guarding, no palpable contractions   Extremities: Warm, well perfused,     NST: Baseline 125/ mod variability/ + accels/- decel  [place]: quiet     ASSESSMENT AND PLAN:   Tatianna Mojica is a 24 y.o.  at 25w0d by 19.3wk US with suspected PPROM.     Oligohydramnios, presumed PPROM  - Hx of oligo since first formal US on  and again on , JIM 4.2 cm. Amniosure positive at OSH prior to transfer. Speculum exam neg x3 on admission to [facility]. Actim Prom negative on . Given high suspicion for PRROM, planning to continue management as such   - Repeat SSUS  with consistent oligohydramnios, last JIM 3.7  - S/p latency antibiotics completed   - S/p BMZ -  - S/p NICU consult   - Discussed genetic testing on rounds , to order testing today   - Planning to continue inpatient management until 34.0 wga unless delivery indicated prior for fetal or maternal compromise     Fetal status  - NST AGA this morning, continue daily NSTs while admitted   - Last Ultrasound:  EFW 557g, 10%, AC 18%, Breech     Routine   - Vaginal irritation: wet prep collected positive for yeast; treated   - GBS negative, 2/10  - BCM: POPs   - Will plan for 1 hr gtt next week     Dispo: Continue inpatient admission until delivery at 34.0 wks or " sooner for fetal or maternal indications     Pt seen and discussed with [facility] Attending, Dr. Clarke.     Madelaine Chapin MD PGY-1   PGY-1, [facility]  Pager [phone]     Principal Problem:     premature rupture of membranes (PPROM) with unknown onset of labor ([facility])  Active Problems:    Oligohydramnios antepartum, second trimester, not applicable or unspecified fetus ([facility])    24 weeks gestation of pregnancy ([facility])

## 2025-02-21 NOTE — CARE PLAN
The patient's goals for the shift include healthy mom; healthy baby    The clinical goals for the shift include Pt to remain pregnant and absent of s/sx of infection.    Over the shift, the patient did make progress toward the following goals.   VS all WNL. Pt stable.       Problem: Antepartum  Goal: Maintain pregnancy as long as maternal and/or fetal condition is stable  Outcome: Progressing  Goal: Avoid/minimize constipation  Outcome: Progressing  Goal: No decrease in circulation/VTE  Outcome: Progressing  Goal: FHR remains reassuring  Outcome: Progressing  Goal: Minimize anxiety/maximize coping  Outcome: Progressing

## 2025-02-22 LAB
ABO GROUP (TYPE) IN BLOOD: NORMAL
ANTIBODY SCREEN: NORMAL
RH FACTOR (ANTIGEN D): NORMAL

## 2025-02-22 PROCEDURE — 99232 SBSQ HOSP IP/OBS MODERATE 35: CPT

## 2025-02-22 PROCEDURE — 2500000001 HC RX 250 WO HCPCS SELF ADMINISTERED DRUGS (ALT 637 FOR MEDICARE OP)

## 2025-02-22 PROCEDURE — 36415 COLL VENOUS BLD VENIPUNCTURE: CPT

## 2025-02-22 PROCEDURE — 59025 FETAL NON-STRESS TEST: CPT

## 2025-02-22 PROCEDURE — 86901 BLOOD TYPING SEROLOGIC RH(D): CPT

## 2025-02-22 PROCEDURE — 1220000001 HC OB SEMI-PRIVATE ROOM DAILY

## 2025-02-22 PROCEDURE — 59025 FETAL NON-STRESS TEST: CPT | Mod: GC

## 2025-02-22 RX ADMIN — PRENATAL VIT W/ FE FUMARATE-FA TAB 27-0.8 MG 1 TABLET: 27-0.8 TAB at 08:30

## 2025-02-22 ASSESSMENT — PAIN SCALES - GENERAL
PAINLEVEL_OUTOF10: 0 - NO PAIN

## 2025-02-22 ASSESSMENT — PAIN - FUNCTIONAL ASSESSMENT
PAIN_FUNCTIONAL_ASSESSMENT: 0-10

## 2025-02-22 NOTE — PROGRESS NOTES
"ANTEPARTUM PROGRESS NOTE   2025, 3:31 AM     SUBJECTIVE:  No acute events overnight. Denies fevers/chills, painful contractions, VB, LOF. Reports normal fetal movement. Patient has no complaints this morning.     OBJECTIVE:   /72 (BP Location: Left arm, Patient Position: Sitting)   Pulse 83   Temp 36.5 °C (97.7 °F) (Temporal)   Resp 16   Ht 1.727 m (5' 8\")   Wt 80.9 kg (178 lb 5.6 oz)   LMP 2024   SpO2 98%   BMI 27.12 kg/m²    Temp  Min: 36.5 °C (97.7 °F)  Max: 36.9 °C (98.4 °F)  Pulse  Min: 83  Max: 97  BP  Min: 104/68  Max: 112/72    General:  AAOx3, No acute distress  Cardiovascular: Warm and well perfused  Respiratory: Normal respiratory effort on RA   Abdominal:  Soft, gravid, non-tender, no rebound or guarding, no palpable contractions   Extremities: Warm, well perfused,     NST: [id number]/mod/+accel/-decel  [place]: quiet     ASSESSMENT AND PLAN:   Tatianna Mojica is a 24 y.o.  at 25w1d by 19.3wk US with suspected PPROM.     Oligohydramnios, presumed PPROM  - Hx of oligo since first formal US on  and again on , JIM 4.2 cm. Amniosure positive at OSH prior to transfer. Speculum exam neg x3 on admission to [facility]. Actim Prom negative on . Given high suspicion for PRROM, planning to continue management as such   - Repeat SSUS  with consistent oligohydramnios, last JIM 3.7  - S/p latency antibiotics completed   - S/p BMZ -  - S/p NICU consult   - Discussed genetic testing on rounds  and ordered, will follow up results   - Planning to continue inpatient management until 34.0 wga unless delivery indicated prior for fetal or maternal compromise     Fetal status  - NST AGA this morning, continue daily NSTs while admitted   - Last Ultrasound:  EFW 557g, 10%, AC 18%, Breech     Routine   - Vaginal irritation: wet prep collected positive for yeast; treated   - GBS negative, 2/10  - BCM: POPs   - Will plan for 1 hr gtt next week     Dispo: Continue " inpatient admission until delivery at 34.0 wks or sooner for fetal or maternal indications     Pt to be seen and discussed with M Attending, Dr. Clarke.   Amy Funes MD  PGY-1, Obstetrics and Gynecology   Pager [phone]     Principal Problem:     premature rupture of membranes (PPROM) with unknown onset of labor ([facility])  Active Problems:    Oligohydramnios antepartum, second trimester, not applicable or unspecified fetus ([facility])    24 weeks gestation of pregnancy ([facility])

## 2025-02-22 NOTE — CARE PLAN
VSS t/o shift. No s/sx infection. No ctx, cramping, bleeding, or LOF. FHR WDL during spot checks.        Problem: Antepartum  Goal: Maintain pregnancy as long as maternal and/or fetal condition is stable  Outcome: Progressing  Goal: Avoid/minimize constipation  Outcome: Progressing  Goal: No decrease in circulation/VTE  Outcome: Progressing  Goal: FHR remains reassuring  Outcome: Progressing  Goal: Minimize anxiety/maximize coping  Outcome: Progressing     Problem: Infection  Goal: Fever/diaphoresis will improve to <38.0 C  Outcome: Progressing  Goal: Wound will have less exudate and warmth  Outcome: Progressing  Goal: Improvement in s/sx of infection  Outcome: Progressing     Problem: Pain - Adult  Goal: Verbalizes/displays adequate comfort level or baseline comfort level  Outcome: Progressing     Problem: Safety - Adult  Goal: Free from fall injury  Outcome: Progressing     Problem:  Labor/Prolonged Premature Rupture of Membranes  Goal: Fewer then 4-6 ct per hour  Outcome: Progressing  Goal: No s/sx of IAI  Outcome: Progressing     Problem: Fall/Injury  Goal: Not fall by end of shift  Outcome: Progressing  Goal: Be free from injury by end of the shift  Outcome: Progressing  Goal: Verbalize understanding of personal risk factors for fall in the hospital  Outcome: Progressing  Goal: Verbalize understanding of risk factor reduction measures to prevent injury from fall in the home  Outcome: Progressing  Goal: Use assistive devices by end of the shift  Outcome: Progressing  Goal: Pace activities to prevent fatigue by end of the shift  Outcome: Progressing

## 2025-02-22 NOTE — CARE PLAN
Problem: Antepartum  Goal: Maintain pregnancy as long as maternal and/or fetal condition is stable  Outcome: Progressing  Goal: Avoid/minimize constipation  Outcome: Progressing  Goal: No decrease in circulation/VTE  Outcome: Progressing  Goal: FHR remains reassuring  Outcome: Progressing  Goal: Minimize anxiety/maximize coping  Outcome: Progressing     Problem:  Labor/Prolonged Premature Rupture of Membranes  Goal: Fewer then 4-6 ct per hour  Outcome: Progressing  Goal: No s/sx of IAI  Outcome: Progressing    The clinical goals for the shift include no s/sx of infection    Over the shift, the patient did make progress toward the following goals. The patient had stable VS and assessments throughout the shift. The patient remained free from pain, s/sx of PTL and s/sx of infection. FHR remained reassuring.

## 2025-02-23 VITALS
TEMPERATURE: 97.7 F | HEIGHT: 68 IN | DIASTOLIC BLOOD PRESSURE: 64 MMHG | OXYGEN SATURATION: 97 % | SYSTOLIC BLOOD PRESSURE: 109 MMHG | BODY MASS INDEX: 27.03 KG/M2 | RESPIRATION RATE: 16 BRPM | WEIGHT: 178.35 LBS | HEART RATE: 93 BPM

## 2025-02-23 PROCEDURE — 99232 SBSQ HOSP IP/OBS MODERATE 35: CPT

## 2025-02-23 PROCEDURE — 59025 FETAL NON-STRESS TEST: CPT

## 2025-02-23 PROCEDURE — 59025 FETAL NON-STRESS TEST: CPT | Mod: GC

## 2025-02-23 PROCEDURE — 1220000001 HC OB SEMI-PRIVATE ROOM DAILY

## 2025-02-23 PROCEDURE — 2500000001 HC RX 250 WO HCPCS SELF ADMINISTERED DRUGS (ALT 637 FOR MEDICARE OP)

## 2025-02-23 RX ADMIN — PRENATAL VIT W/ FE FUMARATE-FA TAB 27-0.8 MG 1 TABLET: 27-0.8 TAB at 08:40

## 2025-02-23 ASSESSMENT — PAIN SCALES - GENERAL
PAINLEVEL_OUTOF10: 0 - NO PAIN

## 2025-02-23 ASSESSMENT — PAIN - FUNCTIONAL ASSESSMENT
PAIN_FUNCTIONAL_ASSESSMENT: 0-10

## 2025-02-23 NOTE — PROGRESS NOTES
"ANTEPARTUM PROGRESS NOTE   2025, 2:11 AM     SUBJECTIVE:  No acute events overnight. Denies fevers/chills, painful contractions, VB, LOF. Reports normal fetal movement. Patient has no complaints this morning.     OBJECTIVE:   /56   Pulse 85   Temp 37.3 °C (99.1 °F) (Temporal)   Resp 16   Ht 1.727 m (5' 8\")   Wt 80.9 kg (178 lb 5.6 oz)   LMP 2024   SpO2 98%   BMI 27.12 kg/m²    Temp  Min: 36.4 °C (97.5 °F)  Max: 37.3 °C (99.1 °F)  Pulse  Min: 85  Max: 99  BP  Min: 100/56  Max: 116/75    General:  AAOx3, No acute distress  Cardiovascular: Warm and well perfused, RRR  Respiratory: Normal respiratory effort on RA, CTAB  Abdominal:  Soft, gravid, non-tender, no rebound or guarding, no palpable contractions   Extremities: Warm, well perfused,     NST: baseline 130/mod/+accel/-decel  [place]: quiet     ASSESSMENT AND PLAN:   Tatianna Mojica is a 24 y.o.  at 25w2d by 19.3wk US with suspected PPROM.     Oligohydramnios, presumed PPROM  - Hx of oligo since first formal US on  and again on , JIM 4.2 cm. Amniosure positive at OSH prior to transfer. Speculum exam neg x3 on admission to [facility]. Actim Prom negative on . Given high suspicion for PRROM, planning to continue management as such   - Repeat SSUS  with consistent oligohydramnios, last JIM 3.7  - S/p latency antibiotics completed   - S/p BMZ -  - S/p NICU consult   - Discussed genetic testing on rounds  and ordered, will follow up results   - Planning to continue inpatient management until 34.0 wga unless delivery indicated prior for fetal or maternal compromise     Fetal status  - NST AGA this morning, continue daily NSTs while admitted   - Last Ultrasound:  EFW 557g, 10%, AC 18%, Breech     Routine   - Vaginal irritation: wet prep collected positive for yeast; treated   - GBS negative, 2/10  - BCM: POPs   - Will plan for 1 hr gtt next week     Dispo: Continue inpatient admission until delivery at 34.0 wks " or sooner for fetal or maternal indications     Pt to be seen and discussed with MFM Attending, Dr. Clarke.   Amy Funes MD  PGY-1, Obstetrics and Gynecology   Pager [phone]     Principal Problem:     premature rupture of membranes (PPROM) with unknown onset of labor ([facility])  Active Problems:    Oligohydramnios antepartum, second trimester, not applicable or unspecified fetus ([facility])    24 weeks gestation of pregnancy ([facility])

## 2025-02-24 PROBLEM — Z3A.25 25 WEEKS GESTATION OF PREGNANCY (HHS-HCC): Status: ACTIVE | Noted: 2025-02-06

## 2025-02-24 PROCEDURE — 59025 FETAL NON-STRESS TEST: CPT | Mod: GC

## 2025-02-24 PROCEDURE — 2500000001 HC RX 250 WO HCPCS SELF ADMINISTERED DRUGS (ALT 637 FOR MEDICARE OP)

## 2025-02-24 PROCEDURE — 1220000001 HC OB SEMI-PRIVATE ROOM DAILY

## 2025-02-24 PROCEDURE — 59025 FETAL NON-STRESS TEST: CPT

## 2025-02-24 PROCEDURE — 99232 SBSQ HOSP IP/OBS MODERATE 35: CPT

## 2025-02-24 RX ADMIN — PRENATAL VIT W/ FE FUMARATE-FA TAB 27-0.8 MG 1 TABLET: 27-0.8 TAB at 08:53

## 2025-02-24 ASSESSMENT — PAIN SCALES - GENERAL
PAINLEVEL_OUTOF10: 0 - NO PAIN

## 2025-02-24 ASSESSMENT — PAIN - FUNCTIONAL ASSESSMENT
PAIN_FUNCTIONAL_ASSESSMENT: 0-10

## 2025-02-24 NOTE — PROGRESS NOTES
"ANTEPARTUM PROGRESS NOTE   2025, 8:06 AM     SUBJECTIVE: No acute events overnight. Denies fevers/chills, painful contractions, VB, LOF. Reports normal fetal movement. Patient has no complaints this morning.     OBJECTIVE:   /63   Pulse 83   Temp 36.6 °C (97.9 °F) (Temporal)   Resp 16   Ht 1.727 m (5' 8\")   Wt 80.9 kg (178 lb 5.6 oz)   LMP 2024   SpO2 99%   BMI 27.12 kg/m²    Temp  Min: 36.5 °C (97.7 °F)  Max: 37 °C (98.6 °F)  Pulse  Min: 83  Max: 93  BP  Min: 102/63  Max: 109/64    General: AAOx3, No acute distress  Cardiovascular: Warm and well perfused, RRR  Respiratory: Normal respiratory effort on RA  Abdominal: Soft, gravid, non-tender, no rebound or guarding, no palpable contractions     NST: baseline 130/mod/+accel/-decel  [place]: no contractions     ASSESSMENT AND PLAN:   Tatianna Mojica is a 24 y.o.  at 25w3d by 19.3wk US with suspected PPROM.     Oligohydramnios, presumed PPROM  - Hx of oligo since first formal US on  and again on , JIM 4.2 cm. Amniosure positive at OSH prior to transfer. Speculum exam neg x3 on admission to [facility]. Actim Prom negative on . Given high suspicion for PRROM, planning to continue management as such   - Repeat SSUS  with consistent oligohydramnios, last JIM 3.7  - S/p latency antibiotics completed   - S/p BMZ -  - S/p NICU consult   - Genetic testing collected and pending   - Planning to continue inpatient management until 34.0 wga unless delivery indicated prior for fetal or maternal compromise     Fetal status  - NST AGA this morning, continue daily NSTs while admitted   - Last Ultrasound:  EFW 557g, 10%, AC 18%, Breech     Routine   - Vaginal irritation: wet prep collected positive for yeast; treated   - GBS negative, 2/10  - BCM: POPs   - Will plan for 1 hr gtt this week     Dispo: Continue inpatient admission until delivery at 34.0 wks or sooner for fetal or maternal indications     Pt to be seen and discussed " with [facility] Attending, Dr. Clarke.   Joanna Zuluaga MD, PGY-3   [facility], Pager [phone]     Principal Problem:     premature rupture of membranes (PPROM) with unknown onset of labor ([facility])  Active Problems:    Oligohydramnios antepartum, second trimester, not applicable or unspecified fetus ([facility])    25 weeks gestation of pregnancy ([facility])

## 2025-02-24 NOTE — CARE PLAN
The patient's goals for the shift include maintain pregnancy    The clinical goals for the shift include Pt to remain pregnant, no s/sx of infection    Over the shift, the patient did make progress toward the following goals. VS WNL, patient absent of s/sx of infection. Patient stable.

## 2025-02-25 LAB
ABO GROUP (TYPE) IN BLOOD: NORMAL
ANTIBODY SCREEN: NORMAL
BASOPHILS # BLD AUTO: 0.02 X10*3/UL (ref 0–0.1)
BASOPHILS NFR BLD AUTO: 0.2 %
EOSINOPHIL # BLD AUTO: 0.16 X10*3/UL (ref 0–0.7)
EOSINOPHIL NFR BLD AUTO: 1.2 %
ERYTHROCYTE [DISTWIDTH] IN BLOOD BY AUTOMATED COUNT: 13 % (ref 11.5–14.5)
FLUAV RNA RESP QL NAA+PROBE: NOT DETECTED
FLUBV RNA RESP QL NAA+PROBE: NOT DETECTED
HCT VFR BLD AUTO: 32.6 % (ref 36–46)
HGB BLD-MCNC: 11.1 G/DL (ref 12–16)
IMM GRANULOCYTES # BLD AUTO: 0.14 X10*3/UL (ref 0–0.7)
IMM GRANULOCYTES NFR BLD AUTO: 1.1 % (ref 0–0.9)
LYMPHOCYTES # BLD AUTO: 2.4 X10*3/UL (ref 1.2–4.8)
LYMPHOCYTES NFR BLD AUTO: 18.4 %
MCH RBC QN AUTO: 31.4 PG (ref 26–34)
MCHC RBC AUTO-ENTMCNC: 34 G/DL (ref 32–36)
MCV RBC AUTO: 92 FL (ref 80–100)
MONOCYTES # BLD AUTO: 1.01 X10*3/UL (ref 0.1–1)
MONOCYTES NFR BLD AUTO: 7.7 %
NEUTROPHILS # BLD AUTO: 9.31 X10*3/UL (ref 1.2–7.7)
NEUTROPHILS NFR BLD AUTO: 71.4 %
NRBC BLD-RTO: 0 /100 WBCS (ref 0–0)
PLATELET # BLD AUTO: 181 X10*3/UL (ref 150–450)
RBC # BLD AUTO: 3.53 X10*6/UL (ref 4–5.2)
RH FACTOR (ANTIGEN D): NORMAL
SARS-COV-2 RNA RESP QL NAA+PROBE: NOT DETECTED
WBC # BLD AUTO: 13 X10*3/UL (ref 4.4–11.3)

## 2025-02-25 PROCEDURE — 86901 BLOOD TYPING SEROLOGIC RH(D): CPT

## 2025-02-25 PROCEDURE — 87636 SARSCOV2 & INF A&B AMP PRB: CPT

## 2025-02-25 PROCEDURE — 59025 FETAL NON-STRESS TEST: CPT

## 2025-02-25 PROCEDURE — 85025 COMPLETE CBC W/AUTO DIFF WBC: CPT

## 2025-02-25 PROCEDURE — 36415 COLL VENOUS BLD VENIPUNCTURE: CPT

## 2025-02-25 PROCEDURE — 99232 SBSQ HOSP IP/OBS MODERATE 35: CPT

## 2025-02-25 PROCEDURE — 1220000001 HC OB SEMI-PRIVATE ROOM DAILY

## 2025-02-25 PROCEDURE — 2500000001 HC RX 250 WO HCPCS SELF ADMINISTERED DRUGS (ALT 637 FOR MEDICARE OP)

## 2025-02-25 PROCEDURE — 59025 FETAL NON-STRESS TEST: CPT | Mod: GC

## 2025-02-25 RX ADMIN — PRENATAL VIT W/ FE FUMARATE-FA TAB 27-0.8 MG 1 TABLET: 27-0.8 TAB at 08:29

## 2025-02-25 ASSESSMENT — PAIN SCALES - GENERAL
PAINLEVEL_OUTOF10: 0 - NO PAIN
PAINLEVEL_OUTOF10: 0 - NO PAIN

## 2025-02-25 ASSESSMENT — PAIN - FUNCTIONAL ASSESSMENT
PAIN_FUNCTIONAL_ASSESSMENT: 0-10
PAIN_FUNCTIONAL_ASSESSMENT: 0-10

## 2025-02-25 NOTE — PROGRESS NOTES
"ANTEPARTUM PROGRESS NOTE   2025, 7:51 AM     SUBJECTIVE: No acute events overnight. Denies fevers/chills, painful contractions, VB, LOF. Reports normal fetal movement. Patient has no complaints this morning.     OBJECTIVE:   /75   Pulse 85   Temp 36.7 °C (98.1 °F) (Temporal)   Resp 20   Ht 1.727 m (5' 8\")   Wt 80.9 kg (178 lb 5.6 oz)   LMP 2024   SpO2 99%   BMI 27.12 kg/m²    Temp  Min: 36.2 °C (97.2 °F)  Max: 36.7 °C (98.1 °F)  Pulse  Min: 85  Max: 90  BP  Min: 106/66  Max: 119/82    General: AAOx3, No acute distress  Cardiovascular: Warm and well perfused, RRR  Respiratory: Normal respiratory effort on RA  Abdominal: Soft, gravid, non-tender, no rebound or guarding, no palpable contractions     NST: baseline 130/mod/+accel/-decel  [place]: no contractions     ASSESSMENT AND PLAN:   Tatianna Mojica is a 24 y.o.  at 25w4d by 19.3wk US with suspected PPROM.     Oligohydramnios, presumed PPROM  - Hx of oligo since first formal US on  and again on , JIM 4.2 cm. Amniosure positive at OSH prior to transfer. Speculum exam neg x3 on admission to [facility]. Actim Prom negative on . Given high suspicion for PRROM, planning to continue management as such   - Repeat SSUS  with consistent oligohydramnios, last JIM 3.7  - S/p latency antibiotics completed   - S/p BMZ -  - S/p NICU consult   - Genetic testing collected and pending   - Planning to continue inpatient management until 34.0 wga unless delivery indicated prior for fetal or maternal compromise  - This AM reporting episode of hot flash and abdominal pain overnight. Denies further fevers/chills. Will collect Flu/COVID swab and CBC with diff today.     Fetal status  - NST AGA this morning, continue daily NSTs while admitted   - Last Ultrasound:  EFW 557g, 10%, AC 18%, Breech     Routine   - Vaginal irritation: wet prep collected positive for yeast; treated   - GBS negative, 2/10  - BCM: POPs   - Will plan for 1 hr " gtt this week     Dispo: Continue inpatient admission until delivery at 34.0 wks or sooner for fetal or maternal indications     Pt to be seen and discussed with [facility] Attending, Dr. Clarke.   Laney Huff MD  PGY-2  M, Pager [phone]     Principal Problem:     premature rupture of membranes (PPROM) with unknown onset of labor ([facility])  Active Problems:    Oligohydramnios antepartum, second trimester, not applicable or unspecified fetus ([facility])    25 weeks gestation of pregnancy ([facility])

## 2025-02-26 LAB — GLUCOSE 1H P 50 G GLC PO SERPL-MCNC: 99 MG/DL

## 2025-02-26 PROCEDURE — 2500000001 HC RX 250 WO HCPCS SELF ADMINISTERED DRUGS (ALT 637 FOR MEDICARE OP)

## 2025-02-26 PROCEDURE — 99232 SBSQ HOSP IP/OBS MODERATE 35: CPT

## 2025-02-26 PROCEDURE — 1220000001 HC OB SEMI-PRIVATE ROOM DAILY

## 2025-02-26 PROCEDURE — 36415 COLL VENOUS BLD VENIPUNCTURE: CPT

## 2025-02-26 PROCEDURE — 82947 ASSAY GLUCOSE BLOOD QUANT: CPT

## 2025-02-26 PROCEDURE — 59025 FETAL NON-STRESS TEST: CPT | Mod: GC

## 2025-02-26 PROCEDURE — 59025 FETAL NON-STRESS TEST: CPT

## 2025-02-26 RX ADMIN — PRENATAL VIT W/ FE FUMARATE-FA TAB 27-0.8 MG 1 TABLET: 27-0.8 TAB at 08:06

## 2025-02-26 ASSESSMENT — PAIN SCALES - GENERAL
PAINLEVEL_OUTOF10: 0 - NO PAIN

## 2025-02-26 ASSESSMENT — PAIN - FUNCTIONAL ASSESSMENT
PAIN_FUNCTIONAL_ASSESSMENT: 0-10

## 2025-02-26 NOTE — PROGRESS NOTES
"ANTEPARTUM PROGRESS NOTE   2025, 6:48 AM     SUBJECTIVE: No acute events overnight. Denies fevers/chills, painful contractions, VB, LOF. Reports normal fetal movement. Patient has no complaints this morning.     OBJECTIVE:   /67   Pulse 74   Temp 36.7 °C (98.1 °F) (Temporal)   Resp 16   Ht 1.727 m (5' 8\")   Wt 80.9 kg (178 lb 5.6 oz)   LMP 2024   SpO2 98%   BMI 27.12 kg/m²    Temp  Min: 36.7 °C (98.1 °F)  Max: 37 °C (98.6 °F)  Pulse  Min: 74  Max: 93  BP  Min: 102/56  Max: 112/62    General: AAOx3, No acute distress  Cardiovascular: Warm and well perfused, RRR  Respiratory: Normal respiratory effort on RA  Abdominal: Soft, gravid, non-tender, no rebound or guarding, no palpable contractions     NST: baseline 135/mod/+accel/-decel  [place]: no contractions     ASSESSMENT AND PLAN:   Tatianna Mojica is a 24 y.o.  at 25w5d by 19.3wk US with suspected PPROM.     Oligohydramnios, presumed PPROM  - Hx of oligo since first formal US on  and again on , JIM 4.2 cm. Amniosure positive at OSH prior to transfer. Speculum exam neg x3 on admission to [facility]. Actim Prom negative on . Given high suspicion for PRROM, planning to continue management as such   - S/p latency antibiotics completed   - S/p BMZ -  - S/p NICU consult   - Genetic testing collected and pending   - Planning to continue inpatient management until 34.0 wga unless delivery indicated prior for fetal or maternal compromise     Fetal status  - NST AGA this morning, continue daily NSTs while admitted   - Last Ultrasound:  EFW 557g, 10%, AC 18%, Breech     Routine   - Vaginal irritation: wet prep collected positive for yeast; treated   - GBS negative, 2/10  - BCM: POPs   - W1 hr gtt to be complted today      Dispo: Continue inpatient admission until delivery at 34.0 wks or sooner for fetal or maternal indications     Pt to be seen and discussed with [facility] Attending, Dr. Clarke.   Joanna Zuluaga MD  " PGY-3  [facility], Pager [phone]     Principal Problem:     premature rupture of membranes (PPROM) with unknown onset of labor ([facility])  Active Problems:    Oligohydramnios antepartum, second trimester, not applicable or unspecified fetus ([facility])    25 weeks gestation of pregnancy ([facility])

## 2025-02-26 NOTE — CARE PLAN
Problem: Antepartum  Goal: Maintain pregnancy as long as maternal and/or fetal condition is stable  Outcome: Progressing     Problem: Safety - Adult  Goal: Free from fall injury  Outcome: Progressing   The patient's goals for the shift include maintain pregnancy    The clinical goals for the shift include no s/sx of infection    Over the shift, the patient made progress towards her goals by maintaining her pregnancy and not having any s/sx of infection.

## 2025-02-27 PROCEDURE — 2500000001 HC RX 250 WO HCPCS SELF ADMINISTERED DRUGS (ALT 637 FOR MEDICARE OP)

## 2025-02-27 PROCEDURE — 59025 FETAL NON-STRESS TEST: CPT

## 2025-02-27 PROCEDURE — 1220000001 HC OB SEMI-PRIVATE ROOM DAILY

## 2025-02-27 PROCEDURE — 99232 SBSQ HOSP IP/OBS MODERATE 35: CPT

## 2025-02-27 PROCEDURE — 59025 FETAL NON-STRESS TEST: CPT | Mod: GC

## 2025-02-27 RX ADMIN — Medication: at 05:04

## 2025-02-27 RX ADMIN — PRENATAL VIT W/ FE FUMARATE-FA TAB 27-0.8 MG 1 TABLET: 27-0.8 TAB at 08:31

## 2025-02-27 ASSESSMENT — PAIN - FUNCTIONAL ASSESSMENT
PAIN_FUNCTIONAL_ASSESSMENT: 0-10

## 2025-02-27 ASSESSMENT — PAIN SCALES - GENERAL
PAINLEVEL_OUTOF10: 0 - NO PAIN

## 2025-02-27 NOTE — CARE PLAN
VSS t/o shift. No s/sx infection. No ctx, cramping, bleeding, or LOF. FHR WDL during spot checks.        Problem: Antepartum  Goal: Maintain pregnancy as long as maternal and/or fetal condition is stable  Outcome: Progressing  Goal: Avoid/minimize constipation  Outcome: Progressing  Goal: No decrease in circulation/VTE  Outcome: Progressing  Goal: FHR remains reassuring  Outcome: Progressing  Goal: Minimize anxiety/maximize coping  Outcome: Progressing     Problem: Pain - Adult  Goal: Verbalizes/displays adequate comfort level or baseline comfort level  Outcome: Progressing     Problem: Safety - Adult  Goal: Free from fall injury  Outcome: Progressing     Problem:  Labor/Prolonged Premature Rupture of Membranes  Goal: Fewer then 4-6 ct per hour  Outcome: Progressing  Goal: No s/sx of IAI  Outcome: Progressing     Problem: Fall/Injury  Goal: Not fall by end of shift  Outcome: Progressing  Goal: Be free from injury by end of the shift  Outcome: Progressing  Goal: Verbalize understanding of personal risk factors for fall in the hospital  Outcome: Progressing  Goal: Verbalize understanding of risk factor reduction measures to prevent injury from fall in the home  Outcome: Progressing  Goal: Use assistive devices by end of the shift  Outcome: Progressing  Goal: Pace activities to prevent fatigue by end of the shift  Outcome: Progressing

## 2025-02-28 LAB
ABO GROUP (TYPE) IN BLOOD: NORMAL
ANTIBODY SCREEN: NORMAL
RH FACTOR (ANTIGEN D): NORMAL

## 2025-02-28 PROCEDURE — 2500000004 HC RX 250 GENERAL PHARMACY W/ HCPCS (ALT 636 FOR OP/ED)

## 2025-02-28 PROCEDURE — 36415 COLL VENOUS BLD VENIPUNCTURE: CPT

## 2025-02-28 PROCEDURE — 59025 FETAL NON-STRESS TEST: CPT | Mod: GC

## 2025-02-28 PROCEDURE — 99232 SBSQ HOSP IP/OBS MODERATE 35: CPT

## 2025-02-28 PROCEDURE — 90471 IMMUNIZATION ADMIN: CPT

## 2025-02-28 PROCEDURE — 1220000001 HC OB SEMI-PRIVATE ROOM DAILY

## 2025-02-28 PROCEDURE — 59025 FETAL NON-STRESS TEST: CPT

## 2025-02-28 PROCEDURE — 2500000001 HC RX 250 WO HCPCS SELF ADMINISTERED DRUGS (ALT 637 FOR MEDICARE OP)

## 2025-02-28 PROCEDURE — 90715 TDAP VACCINE 7 YRS/> IM: CPT

## 2025-02-28 PROCEDURE — 86901 BLOOD TYPING SEROLOGIC RH(D): CPT

## 2025-02-28 RX ADMIN — PRENATAL VIT W/ FE FUMARATE-FA TAB 27-0.8 MG 1 TABLET: 27-0.8 TAB at 08:37

## 2025-02-28 RX ADMIN — TETANUS TOXOID, REDUCED DIPHTHERIA TOXOID AND ACELLULAR PERTUSSIS VACCINE, ADSORBED 0.5 ML: 5; 2.5; 8; 8; 2.5 SUSPENSION INTRAMUSCULAR at 15:46

## 2025-02-28 ASSESSMENT — PAIN SCALES - GENERAL
PAINLEVEL_OUTOF10: 7
PAINLEVEL_OUTOF10: 0 - NO PAIN
PAINLEVEL_OUTOF10: 0 - NO PAIN
PAINLEVEL_OUTOF10: 6

## 2025-02-28 ASSESSMENT — PAIN DESCRIPTION - DESCRIPTORS
DESCRIPTORS: SORE
DESCRIPTORS: SORE

## 2025-02-28 ASSESSMENT — PAIN - FUNCTIONAL ASSESSMENT
PAIN_FUNCTIONAL_ASSESSMENT: 0-10
PAIN_FUNCTIONAL_ASSESSMENT: 0-10

## 2025-02-28 NOTE — PROGRESS NOTES
"ANTEPARTUM PROGRESS NOTE   2025, 6:17 AM     SUBJECTIVE: No acute events overnight. Patient has no concerns this morning. Denies painful contractions, VB, LOF, feeling feverish or chills. . Reports normal fetal movement. Reports that she has had some continued leaking, unchanged from prior; still reports it is yellow-clear with no change in the smell or texture. Reports ongoing round ligament pain that improves with rest.   Also reports intermittent diarrhea that has been waxing and waning, she believes it may be related to her dietary intake.     OBJECTIVE:   /72 (BP Location: Left arm, Patient Position: Sitting)   Pulse 95   Temp 37.4 °C (99.3 °F) (Temporal)   Resp 16   Ht 1.727 m (5' 8\")   Wt 80.9 kg (178 lb 5.6 oz)   LMP 2024   SpO2 98%   BMI 27.12 kg/m²    Temp  Min: 36.3 °C (97.3 °F)  Max: 37.4 °C (99.3 °F)  Pulse  Min: 72  Max: 95  BP  Min: 100/63  Max: 119/70    General:  AAOx3, No acute distress  Cardiovascular: Warm and well perfused  Respiratory: Normal respiratory effort   Abdominal:  Soft, gravid, non-tender, no rebound or guarding, no palpable contractions   Extremities: Warm, well perfused, no edema, no calf tenderness   Pelvic: deferred    NST: Baseline 130/ moderate variability + accels/ - decels  [place]: acontractile    Labs:   Results from last 7 days   Lab Units 25  0739   WBC AUTO x10*3/uL 13.0*   HEMOGLOBIN g/dL 11.1*   HEMATOCRIT % 32.6*   PLATELETS AUTO x10*3/uL 181        ASSESSMENT AND PLAN:     24 y.o.  at 26w0d by 19.3wk US with suspected PPROM.     Oligohydramnios, presumed PPROM  - Hx of oligo since first formal US on  and again on , JIM 4.2 cm. Amniosure positive at OSH prior to transfer. Speculum exam neg x3 on admission to [facility]. Actim Prom negative on . Given high suspicion for PRROM, planning to continue management as such   - Pt now leaking fluid that is mostly clear with yellow   - S/p latency antibiotics completed   - S/p BMZ " 2/5-2/6  - S/p NICU consult 2/5  - Genetic testing collected and negative   - Pelvic discomfort consistent with round ligament pain on physical exam, discussed tylenol, heat packs, and stretches for relief   - Planning to continue inpatient management until 34.0 wga unless delivery indicated prior for fetal or maternal compromise     Fetal status  - NST AGA this morning, continue daily NSTs while admitted   - Last Ultrasound: 2/14 EFW 557g, 10%, AC 18%, Breech     Routine   - Vaginal irritation: wet prep collected positive for yeast; treated 2/19  - GBS negative, 2/10  - BCM: POPs   - 1 hr gtt 99      Dispo: Continue inpatient admission until delivery at 34.0 wks or sooner for fetal or maternal indications     Pt to be seen and discussed with [facility] Attending, Dr. Schultz.     Roxi Sierra MD, MPH  Obstetrics & Gynecology, PGY-1   [facility]  Phone [phone]  Pager [phone]

## 2025-02-28 NOTE — CARE PLAN
Problem: Antepartum  Goal: Maintain pregnancy as long as maternal and/or fetal condition is stable  Outcome: Progressing  Goal: Avoid/minimize constipation  Outcome: Progressing  Goal: No decrease in circulation/VTE  Outcome: Progressing  Goal: FHR remains reassuring  Outcome: Progressing  Goal: Minimize anxiety/maximize coping  Outcome: Progressing     Problem: Pain - Adult  Goal: Verbalizes/displays adequate comfort level or baseline comfort level  Outcome: Progressing     Problem: Safety - Adult  Goal: Free from fall injury  Outcome: Progressing     Problem:  Labor/Prolonged Premature Rupture of Membranes  Goal: Fewer then 4-6 ct per hour  Outcome: Progressing  Goal: No s/sx of IAI  Outcome: Progressing   The patient's goals for the shift include Maintain pregnancy    The clinical goals for the shift include Maintain reasurring maternal and fetal status    Over the shift, the patient did make progress toward the following goals. Patient denies PTL S/sx and no S/Sx of infection noted. Patient received tdap vaccine as ordered during shift.

## 2025-03-01 PROCEDURE — 1220000001 HC OB SEMI-PRIVATE ROOM DAILY

## 2025-03-01 PROCEDURE — 2500000001 HC RX 250 WO HCPCS SELF ADMINISTERED DRUGS (ALT 637 FOR MEDICARE OP)

## 2025-03-01 PROCEDURE — 59025 FETAL NON-STRESS TEST: CPT

## 2025-03-01 PROCEDURE — 59025 FETAL NON-STRESS TEST: CPT | Mod: GC

## 2025-03-01 PROCEDURE — 99232 SBSQ HOSP IP/OBS MODERATE 35: CPT

## 2025-03-01 RX ADMIN — PRENATAL VIT W/ FE FUMARATE-FA TAB 27-0.8 MG 1 TABLET: 27-0.8 TAB at 08:08

## 2025-03-01 ASSESSMENT — PAIN - FUNCTIONAL ASSESSMENT
PAIN_FUNCTIONAL_ASSESSMENT: 0-10

## 2025-03-01 ASSESSMENT — PAIN SCALES - GENERAL
PAINLEVEL_OUTOF10: 0 - NO PAIN

## 2025-03-01 NOTE — PROGRESS NOTES
"ANTEPARTUM PROGRESS NOTE   3/1/2025, 6:47 AM     SUBJECTIVE: No acute events overnight. Patient has no complaints this morning. Denies painful contractions, VB. Had one gush of fluid yesterday, normal appearing. No abnormal discharge. Reports normal fetal movement. Has stable round ligament pain over the last few days, no other abdominal pain.    OBJECTIVE:   /74   Pulse 87   Temp 36.6 °C (97.9 °F) (Temporal)   Resp 18   Ht 1.727 m (5' 8\")   Wt 80.9 kg (178 lb 5.6 oz)   LMP 2024   SpO2 97%   BMI 27.12 kg/m²    Temp  Min: 36.1 °C (97 °F)  Max: 36.6 °C (97.9 °F)  Pulse  Min: 74  Max: 96  BP  Min: 96/62  Max: 116/66    General:  AAOx3, No acute distress  Cardiovascular: Warm and well perfused  Respiratory: Normal respiratory effort   Abdominal:  Soft, gravid, non-tender, no rebound or guarding  Extremities: Warm, well perfused    NST: Baseline 125/ mod variability /pos accels/ no decels  [place]: acontractile     Labs:   No results found for: \"WBC\", \"HGB\", \"HCT\", \"PLT\"  No results found for: \"GLUCOSE\", \"NA\", \"K\", \"CL\", \"CO2\", \"ANIONGAP\", \"BUN\", \"CREATININE\", \"EGFR\", \"CALCIUM\", \"ALBUMIN\", \"PROT\", \"ALKPHOS\", \"ALT\", \"AST\", \"BILITOT\"    ASSESSMENT AND PLAN:     24 y.o.  at 26w1d by by 19.3wk US with suspected PPROM.     Oligohydramnios, presumed PPROM  - Hx of oligo since first formal US on  and again on , JIM 4.2 cm. Amniosure positive at OSH prior to transfer. Speculum exam neg x3 on admission to [facility]. Actim Prom negative on . Given high suspicion for PRROM, planning to continue management as such   - Pt now leaking fluid that is mostly clear with yellow   - S/p latency antibiotics completed   - S/p BMZ -  - S/p NICU consult   - Genetic testing collected and negative   - Planning to continue inpatient management until 34.0 wga unless delivery indicated prior for fetal or maternal compromise     Fetal status  - NST AGA this morning, continue daily NSTs while admitted   - Last " Ultrasound:  EFW 557g, 10%, AC 18%, Breech     Routine   - Vaginal irritation: wet prep collected positive for yeast; treated   - GBS negative, 2/10  - BCM: POPs   - 1 hr gtt 99      Dispo: Continue inpatient admission until delivery at 34.0 wks or sooner for fetal or maternal indications    To be seen and discussed with [facility] Attending, Dr. Schultz.     Shelby Gallardo MD PGY3  [facility] Pager [phone]     Principal Problem:     premature rupture of membranes (PPROM) with unknown onset of labor ([facility])  Active Problems:    Oligohydramnios antepartum, second trimester, not applicable or unspecified fetus ([facility])    25 weeks gestation of pregnancy ([facility])

## 2025-03-01 NOTE — CARE PLAN
The patient's goals for the shift include vitals and assessment WNL    The clinical goals for the shift include vitals and assessment WNL    Over the shift, the patient met the above goals.  Safety maintained.       Problem: Antepartum  Goal: Maintain pregnancy as long as maternal and/or fetal condition is stable  Outcome: Progressing  Goal: Avoid/minimize constipation  Outcome: Progressing  Goal: No decrease in circulation/VTE  Outcome: Progressing  Goal: FHR remains reassuring  Outcome: Progressing  Goal: Minimize anxiety/maximize coping  Outcome: Progressing     Problem: Safety - Adult  Goal: Free from fall injury  Outcome: Progressing     Problem:  Labor/Prolonged Premature Rupture of Membranes  Goal: Fewer then 4-6 ct per hour  Outcome: Progressing  Goal: No s/sx of IAI  Outcome: Progressing

## 2025-03-01 NOTE — CARE PLAN
The patient's goals for the shift include Maintain pregnancy    The clinical goals for the shift include Pt to maintain reassurring maternal and fetal status.    Over the shift, the patient did make progress toward the following goals.

## 2025-03-02 VITALS
WEIGHT: 190.92 LBS | DIASTOLIC BLOOD PRESSURE: 65 MMHG | HEART RATE: 85 BPM | HEIGHT: 68 IN | OXYGEN SATURATION: 97 % | SYSTOLIC BLOOD PRESSURE: 107 MMHG | RESPIRATION RATE: 16 BRPM | BODY MASS INDEX: 28.94 KG/M2 | TEMPERATURE: 97.5 F

## 2025-03-02 PROCEDURE — 99232 SBSQ HOSP IP/OBS MODERATE 35: CPT

## 2025-03-02 PROCEDURE — 2500000001 HC RX 250 WO HCPCS SELF ADMINISTERED DRUGS (ALT 637 FOR MEDICARE OP)

## 2025-03-02 PROCEDURE — 1220000001 HC OB SEMI-PRIVATE ROOM DAILY

## 2025-03-02 PROCEDURE — 59025 FETAL NON-STRESS TEST: CPT | Mod: GC

## 2025-03-02 PROCEDURE — 59025 FETAL NON-STRESS TEST: CPT

## 2025-03-02 RX ADMIN — PRENATAL VIT W/ FE FUMARATE-FA TAB 27-0.8 MG 1 TABLET: 27-0.8 TAB at 08:50

## 2025-03-02 ASSESSMENT — PAIN SCALES - GENERAL
PAINLEVEL_OUTOF10: 0 - NO PAIN

## 2025-03-02 ASSESSMENT — PAIN - FUNCTIONAL ASSESSMENT
PAIN_FUNCTIONAL_ASSESSMENT: 0-10

## 2025-03-02 NOTE — PROGRESS NOTES
"ANTEPARTUM PROGRESS NOTE   3/2/2025, 7:00 AM     SUBJECTIVE: No acute events overnight. Patient has no complaints this morning. Denies painful contractions, abdominal pain, VB, discharge. Had another gush of fluid overnight that appears clear to yellow which is her baseline. Reports normal fetal movement.     OBJECTIVE:   /68   Pulse 87   Temp 37.3 °C (99.1 °F) (Temporal)   Resp 16   Ht 1.727 m (5' 8\")   Wt 80.9 kg (178 lb 5.6 oz)   LMP 2024   SpO2 97%   BMI 27.12 kg/m²    Temp  Min: 36.7 °C (98.1 °F)  Max: 37.3 °C (99.1 °F)  Pulse  Min: 77  Max: 99  BP  Min: 103/63  Max: 122/68    General:  AAOx3, No acute distress  Cardiovascular: Warm and well perfused  Respiratory: Normal respiratory effort   Abdominal:  Soft, gravid, non-tender, no rebound or guarding  Extremities: Warm, well perfuse    NST: Baseline 125/ mod variability /pos accels/ no decels  [place]: acontractile     Labs:   Labs in chart were reviewed.    ASSESSMENT AND PLAN:     24 y.o.  at 26w2d by 19.3wk US with suspected PPROM.     Oligohydramnios, presumed PPROM  - Hx of oligo since first formal US on  and again on , JIM 4.2 cm. Amniosure positive at OSH prior to transfer. Speculum exam neg x3 on admission to [facility]. Actim Prom negative on . Given high suspicion for PRROM, planning to continue management as such   - Pt now leaking fluid that is mostly clear with yellow   - S/p latency antibiotics completed   - S/p BMZ -  - S/p NICU consult   - Genetic testing collected and negative   - Planning to continue inpatient management until 34.0 wga unless delivery indicated prior for fetal or maternal compromise     Fetal status  - NST AGA this morning, continue daily NSTs while admitted   - Last Ultrasound:  EFW 557g, 10%, AC 18%, Breech     Routine   - Vaginal irritation: wet prep collected positive for yeast; treated   - GBS negative, 2/10  - BCM: POPs   - 1 hr gtt 99      Dispo: Continue inpatient " admission until delivery at 34.0 wks or sooner for fetal or maternal indications     To be seen and discussed with [facility] Attending, Dr. Schultz.      Shelby Gallardo MD PGY3  [facility] Pager [phone]     Principal Problem:     premature rupture of membranes (PPROM) with unknown onset of labor ([facility])  Active Problems:    Oligohydramnios antepartum, second trimester, not applicable or unspecified fetus ([facility])    25 weeks gestation of pregnancy ([facility])

## 2025-03-02 NOTE — CARE PLAN
VSS t/o shift. No s/sx infection. No ctx, cramping, bleeding, or LOF. FHR WDL during spot checks.        Problem: Antepartum  Goal: Maintain pregnancy as long as maternal and/or fetal condition is stable  Outcome: Progressing  Goal: Avoid/minimize constipation  Outcome: Progressing  Goal: No decrease in circulation/VTE  Outcome: Progressing  Goal: FHR remains reassuring  Outcome: Progressing  Goal: Minimize anxiety/maximize coping  Outcome: Progressing    Problem: Safety - Adult  Goal: Free from fall injury  Outcome: Progressing     Problem:  Labor/Prolonged Premature Rupture of Membranes  Goal: Fewer then 4-6 ct per hour  Outcome: Progressing  Goal: No s/sx of IAI  Outcome: Progressing

## 2025-03-02 NOTE — CARE PLAN
Problem: Antepartum  Goal: Maintain pregnancy as long as maternal and/or fetal condition is stable  Outcome: Progressing  Goal: Avoid/minimize constipation  Outcome: Progressing  Goal: No decrease in circulation/VTE  Outcome: Progressing  Goal: FHR remains reassuring  Outcome: Progressing  Goal: Minimize anxiety/maximize coping  Outcome: Progressing     Problem: Safety - Adult  Goal: Free from fall injury  Outcome: Progressing     Problem:  Labor/Prolonged Premature Rupture of Membranes  Goal: Fewer then 4-6 ct per hour  Outcome: Progressing  Goal: No s/sx of IAI  Outcome: Progressing   The clinical goals for the shift include no s/sx of infection    Over the shift, the patient did make progress toward the following goals. The patient had stable VS and assessments. FHR remained reassuring. Patient remained free from s/sx of PTL and infection.

## 2025-03-03 ENCOUNTER — APPOINTMENT (OUTPATIENT)
Dept: RADIOLOGY | Facility: HOSPITAL | Age: 25
End: 2025-03-03
Payer: COMMERCIAL

## 2025-03-03 LAB
ABO GROUP (TYPE) IN BLOOD: NORMAL
ANTIBODY SCREEN: NORMAL
ERYTHROCYTE [DISTWIDTH] IN BLOOD BY AUTOMATED COUNT: 12.8 % (ref 11.5–14.5)
HCT VFR BLD AUTO: 33.6 % (ref 36–46)
HGB BLD-MCNC: 11.1 G/DL (ref 12–16)
MCH RBC QN AUTO: 30.6 PG (ref 26–34)
MCHC RBC AUTO-ENTMCNC: 33 G/DL (ref 32–36)
MCV RBC AUTO: 93 FL (ref 80–100)
NRBC BLD-RTO: 0 /100 WBCS (ref 0–0)
PLATELET # BLD AUTO: 197 X10*3/UL (ref 150–450)
RBC # BLD AUTO: 3.63 X10*6/UL (ref 4–5.2)
RH FACTOR (ANTIGEN D): NORMAL
WBC # BLD AUTO: 13.9 X10*3/UL (ref 4.4–11.3)

## 2025-03-03 PROCEDURE — 86901 BLOOD TYPING SEROLOGIC RH(D): CPT

## 2025-03-03 PROCEDURE — 85027 COMPLETE CBC AUTOMATED: CPT

## 2025-03-03 PROCEDURE — 99232 SBSQ HOSP IP/OBS MODERATE 35: CPT

## 2025-03-03 PROCEDURE — 76820 UMBILICAL ARTERY ECHO: CPT | Performed by: STUDENT IN AN ORGANIZED HEALTH CARE EDUCATION/TRAINING PROGRAM

## 2025-03-03 PROCEDURE — 76820 UMBILICAL ARTERY ECHO: CPT

## 2025-03-03 PROCEDURE — 36415 COLL VENOUS BLD VENIPUNCTURE: CPT

## 2025-03-03 PROCEDURE — 2500000001 HC RX 250 WO HCPCS SELF ADMINISTERED DRUGS (ALT 637 FOR MEDICARE OP)

## 2025-03-03 PROCEDURE — 59025 FETAL NON-STRESS TEST: CPT | Mod: GC

## 2025-03-03 PROCEDURE — 76815 OB US LIMITED FETUS(S): CPT

## 2025-03-03 PROCEDURE — 1220000001 HC OB SEMI-PRIVATE ROOM DAILY

## 2025-03-03 PROCEDURE — 76819 FETAL BIOPHYS PROFIL W/O NST: CPT | Performed by: STUDENT IN AN ORGANIZED HEALTH CARE EDUCATION/TRAINING PROGRAM

## 2025-03-03 PROCEDURE — 76815 OB US LIMITED FETUS(S): CPT | Performed by: STUDENT IN AN ORGANIZED HEALTH CARE EDUCATION/TRAINING PROGRAM

## 2025-03-03 PROCEDURE — 59025 FETAL NON-STRESS TEST: CPT

## 2025-03-03 RX ADMIN — CALCIUM CARBONATE (ANTACID) CHEW TAB 500 MG 500 MG: 500 CHEW TAB at 06:41

## 2025-03-03 RX ADMIN — PRENATAL VIT W/ FE FUMARATE-FA TAB 27-0.8 MG 1 TABLET: 27-0.8 TAB at 09:33

## 2025-03-03 ASSESSMENT — PAIN SCALES - GENERAL
PAINLEVEL_OUTOF10: 0 - NO PAIN
PAINLEVEL_OUTOF10: 0 - NO PAIN

## 2025-03-03 ASSESSMENT — PAIN - FUNCTIONAL ASSESSMENT
PAIN_FUNCTIONAL_ASSESSMENT: 0-10
PAIN_FUNCTIONAL_ASSESSMENT: 0-10

## 2025-03-03 NOTE — PROGRESS NOTES
"ANTEPARTUM PROGRESS NOTE   3/3/2025, 6:57 AM     SUBJECTIVE: No acute events overnight. Patient has no complaints this morning. Denies painful contractions, abdominal pain, VB, discharge. Continues to have gushes of fluid that appears clear to yellow, which is her baseline. Reports normal fetal movement.     OBJECTIVE:   /64 (BP Location: Left arm, Patient Position: Sitting)   Pulse 90   Temp 37.6 °C (99.7 °F) (Temporal)   Resp 16   Ht 1.727 m (5' 8\")   Wt 86.6 kg (190 lb 14.7 oz)   LMP 2024   SpO2 98%   BMI 29.03 kg/m²    Temp  Min: 36.4 °C (97.5 °F)  Max: 37.6 °C (99.7 °F)  Pulse  Min: 85  Max: 96  BP  Min: 101/64  Max: 120/77    General:  AAOx3, No acute distress  Cardiovascular: Warm and well perfused  Respiratory: Normal respiratory effort   Abdominal:  Soft, gravid, non-tender  Extremities: Warm, well perfuse    NST: Baseline 130/ mod variability /pos accels/ no decels  [place]: acontractile     Labs:   Labs in chart were reviewed.    ASSESSMENT AND PLAN:     24 y.o.  at 26w3d by 19.3wk US with suspected PPROM.     Oligohydramnios, presumed PPROM  - Hx of oligo since first formal US on  and again on , JIM 4.2 cm. Amniosure positive at OSH prior to transfer. Speculum exam neg x3 on admission to [facility]. Actim Prom negative on . Given high suspicion for PRROM, planning to continue management as such   - Pt now leaking fluid that is mostly clear with yellow   - S/p latency antibiotics completed   - S/p BMZ -  - S/p NICU consult   - Genetic testing collected and negative   - Planning to continue inpatient management until 34.0 wga unless delivery indicated prior for fetal or maternal compromise     Fetal status  - NST AGA this morning, continue daily NSTs while admitted   - Last Ultrasound:  EFW 557g, 10%, AC 18%, Breech     Routine   - Vaginal irritation: wet prep collected positive for yeast; treated   - GBS negative, 2/10  - BCM: POPs   - 1 hr gtt 99      Dispo: " Continue inpatient admission until delivery at 34.0 wks or sooner for fetal or maternal indications     To be seen and discussed with [facility] Attending, Dr. Schultz.      Akila Quijano MD PGY2  [facility] Pager [phone]     Principal Problem:     premature rupture of membranes (PPROM) with unknown onset of labor ([facility])  Active Problems:    Oligohydramnios antepartum, second trimester, not applicable or unspecified fetus ([facility])    25 weeks gestation of pregnancy ([facility])

## 2025-03-03 NOTE — CARE PLAN
The patient's goals for the shift include STABLE VITAL SIGNS    The clinical goals for the shift include NORMAL WHITE COUNT AND AFEBRILE    Over the shift, the patient did  make progress toward the following goals. Barriers to progression include none. Recommendations to address these barriers include assessing for new signs of infection,watching for fluid changes,or fevers.

## 2025-03-03 NOTE — CARE PLAN
Problem: Antepartum  Goal: Maintain pregnancy as long as maternal and/or fetal condition is stable  Outcome: Progressing  Goal: Avoid/minimize constipation  Outcome: Progressing  Goal: No decrease in circulation/VTE  Outcome: Progressing  Goal: FHR remains reassuring  Outcome: Progressing  Goal: Minimize anxiety/maximize coping  Outcome: Progressing     Problem: Safety - Adult  Goal: Free from fall injury  Outcome: Progressing     Problem:  Labor/Prolonged Premature Rupture of Membranes  Goal: Fewer then 4-6 ct per hour  Outcome: Progressing  Goal: No s/sx of IAI  Outcome: Progressing   The clinical goals for the shift include no s/sx of infection    Over the shift, the patient did make progress toward the following goals. The patient had stable VS and assessments. FHR remained reassuring. The patient remained free from s/sx of PTL and infection.

## 2025-03-04 PROCEDURE — 1220000001 HC OB SEMI-PRIVATE ROOM DAILY

## 2025-03-04 PROCEDURE — 59025 FETAL NON-STRESS TEST: CPT | Mod: GC

## 2025-03-04 PROCEDURE — 59025 FETAL NON-STRESS TEST: CPT

## 2025-03-04 PROCEDURE — 99232 SBSQ HOSP IP/OBS MODERATE 35: CPT

## 2025-03-04 PROCEDURE — 2500000001 HC RX 250 WO HCPCS SELF ADMINISTERED DRUGS (ALT 637 FOR MEDICARE OP)

## 2025-03-04 RX ADMIN — PRENATAL VIT W/ FE FUMARATE-FA TAB 27-0.8 MG 1 TABLET: 27-0.8 TAB at 09:03

## 2025-03-04 ASSESSMENT — PAIN - FUNCTIONAL ASSESSMENT
PAIN_FUNCTIONAL_ASSESSMENT: 0-10

## 2025-03-04 ASSESSMENT — PAIN SCALES - GENERAL
PAINLEVEL_OUTOF10: 0 - NO PAIN

## 2025-03-04 NOTE — PROGRESS NOTES
"ANTEPARTUM PROGRESS NOTE   3/4/2025, 7:06 AM     SUBJECTIVE: No acute events overnight. Patient has no complaints this morning. Denies painful contractions, VB, vaginal discharge. Continues to have intermittent gushes of clear/yellow fluid which has been unchanged since admission. Reports normal fetal movement.     OBJECTIVE:   /67   Pulse 97   Temp 36.9 °C (98.4 °F) (Temporal)   Resp 18   Ht 1.727 m (5' 8\")   Wt 86.6 kg (190 lb 14.7 oz)   LMP 2024   SpO2 97%   BMI 29.03 kg/m²    Temp  Min: 36.3 °C (97.3 °F)  Max: 36.9 °C (98.4 °F)  Pulse  Min: 93  Max: 98  BP  Min: 98/62  Max: 110/64    General:  AAOx3, No acute distress  Cardiovascular: Warm and well perfused  Respiratory: Normal respiratory effort   Abdominal:  Soft, gravid, non-tender, no rebound or guarding  Extremities: Warm, well perfused    NST: Baseline 125/ mod variability /pos accels/ neg decels  [place]: acontractile     Labs:   Lab Results   Component Value Date    WBC 13.9 (H) 2025    HGB 11.1 (L) 2025    HCT 33.6 (L) 2025     2025       ASSESSMENT AND PLAN:     24 y.o.  at 26w4d by 19.3wk US with PPROM.     Oligohydramnios, PPROM  - Hx of oligo since first formal US on  and again on , JIM 4.2 cm. Amniosure positive at OSH prior to transfer. Speculum exam neg x3 on admission to [facility]. Actim Prom negative on . Given high suspicion for PRROM, planning to continue management as such   - Pt continues to have leaking fluid that is mostly clear with yellow   - S/p latency antibiotics completed   - S/p BMZ -  - S/p NICU consult   - Genetic testing collected and negative   - Planning to continue inpatient management until 34.0 wga unless delivery indicated prior for fetal or maternal compromise     Fetal status  - NST AGA this morning, continue daily NSTs while admitted   - Last growth Ultrasound: 2/14 EFW 557g, 10%, AC 18%, Breech     Routine   - Vaginal irritation: wet prep collected " positive for yeast; treated 2/19  - GBS negative, 2/10  - BCM: POPs   - 1 hr gtt 99      Dispo: Continue inpatient admission until delivery at 34.0 wks or sooner for fetal or maternal indications     To be seen and discussed with M Attending, Dr. Schultz.     Shelby Gallardo MD PGY3  [facility] Pager [phone]

## 2025-03-05 PROCEDURE — 2500000001 HC RX 250 WO HCPCS SELF ADMINISTERED DRUGS (ALT 637 FOR MEDICARE OP)

## 2025-03-05 PROCEDURE — 99232 SBSQ HOSP IP/OBS MODERATE 35: CPT

## 2025-03-05 PROCEDURE — 1220000001 HC OB SEMI-PRIVATE ROOM DAILY

## 2025-03-05 PROCEDURE — 59025 FETAL NON-STRESS TEST: CPT | Mod: GC

## 2025-03-05 PROCEDURE — 59025 FETAL NON-STRESS TEST: CPT

## 2025-03-05 RX ADMIN — PRENATAL VIT W/ FE FUMARATE-FA TAB 27-0.8 MG 1 TABLET: 27-0.8 TAB at 07:39

## 2025-03-05 ASSESSMENT — PAIN SCALES - GENERAL
PAINLEVEL_OUTOF10: 0 - NO PAIN

## 2025-03-05 ASSESSMENT — PAIN - FUNCTIONAL ASSESSMENT
PAIN_FUNCTIONAL_ASSESSMENT: 0-10

## 2025-03-05 NOTE — CARE PLAN
VSS t/o shift. No s/sx infection. No ctx, cramping, or bleeding. LOF went from small, nonfoul, clear/yellow to none. FHR WDL during spot checks.        Problem: Antepartum  Goal: Maintain pregnancy as long as maternal and/or fetal condition is stable  Outcome: Progressing  Goal: Avoid/minimize constipation  Outcome: Progressing  Goal: No decrease in circulation/VTE  Outcome: Progressing  Goal: FHR remains reassuring  Outcome: Progressing  Goal: Minimize anxiety/maximize coping  Outcome: Progressing     Problem: Safety - Adult  Goal: Free from fall injury  Outcome: Progressing     Problem:  Labor/Prolonged Premature Rupture of Membranes  Goal: Fewer then 4-6 ct per hour  Outcome: Progressing  Goal: No s/sx of IAI  Outcome: Progressing

## 2025-03-05 NOTE — PROGRESS NOTES
"ANTEPARTUM PROGRESS NOTE   3/5/2025, 5:57 AM     SUBJECTIVE: No acute events overnight. Patient has no complaints this morning. Denies painful contractions or VB. Continues to have intermittent gushes of clear/yellow fluid which has been unchanged since admission. Reports normal fetal movement.     OBJECTIVE:   /76   Pulse 94   Temp 36.4 °C (97.5 °F) (Temporal)   Resp 16   Ht 1.727 m (5' 8\")   Wt 86.6 kg (190 lb 14.7 oz)   LMP 2024   SpO2 96%   BMI 29.03 kg/m²    Temp  Min: 36.3 °C (97.3 °F)  Max: 36.8 °C (98.2 °F)  Pulse  Min: 78  Max: 100  BP  Min: 106/68  Max: 128/76    General:  AAOx3, No acute distress  Cardiovascular: Warm and well perfused, RRR  Respiratory: Normal respiratory effort, CTAB  Abdominal:  Soft, gravid, non-tender, no rebound or guarding  Extremities: Warm, well perfused    NST: Baseline 130/mod/+accel/-decel  [place]: quiet    Labs:   Lab Results   Component Value Date    WBC 13.9 (H) 2025    HGB 11.1 (L) 2025    HCT 33.6 (L) 2025     2025       ASSESSMENT AND PLAN:     24 y.o.  at 26w5d by 19.3wk US with PPROM.     Oligohydramnios, PPROM  - Hx of oligo since first formal US on  and again on , JIM 4.2 cm. Amniosure positive at OSH prior to transfer. Speculum exam neg x3 on admission to [facility]. Actim Prom negative on . Given high suspicion for PRROM, planning to continue management as such   - Pt continues to have leaking fluid that is mostly clear with yellow   - S/p latency antibiotics completed   - S/p BMZ -  - S/p NICU consult   - Genetic testing collected and negative   - Planning to continue inpatient management until 34.0 wga unless delivery indicated prior for fetal or maternal compromise     Fetal status  - NST AGA this morning, continue daily NSTs while admitted   - Last growth Ultrasound:  EFW 557g, 10%, AC 18%, Breech , next growth for 3/10  - BPP 8/8 on 3/3, JIM 4.8 cm, MVP 2.1, breech     Routine   - " Vaginal irritation: wet prep collected positive for yeast; treated 2/19  - GBS negative, 2/10  - BCM: POPs   - 1 hr gtt 99      Dispo: Continue inpatient admission until delivery at 34.0 wks or sooner for fetal or maternal indications     To be seen and discussed with [facility] Attending, Dr. Schultz.     Amy Funes MD PGY1  [facility] Pager [phone]

## 2025-03-06 LAB
ABO GROUP (TYPE) IN BLOOD: NORMAL
ANTIBODY SCREEN: NORMAL
RH FACTOR (ANTIGEN D): NORMAL

## 2025-03-06 PROCEDURE — 2500000001 HC RX 250 WO HCPCS SELF ADMINISTERED DRUGS (ALT 637 FOR MEDICARE OP)

## 2025-03-06 PROCEDURE — 59025 FETAL NON-STRESS TEST: CPT | Mod: GC

## 2025-03-06 PROCEDURE — 59025 FETAL NON-STRESS TEST: CPT

## 2025-03-06 PROCEDURE — 1220000001 HC OB SEMI-PRIVATE ROOM DAILY

## 2025-03-06 PROCEDURE — 36415 COLL VENOUS BLD VENIPUNCTURE: CPT

## 2025-03-06 PROCEDURE — 86901 BLOOD TYPING SEROLOGIC RH(D): CPT

## 2025-03-06 PROCEDURE — 99232 SBSQ HOSP IP/OBS MODERATE 35: CPT

## 2025-03-06 RX ADMIN — PRENATAL VIT W/ FE FUMARATE-FA TAB 27-0.8 MG 1 TABLET: 27-0.8 TAB at 08:15

## 2025-03-06 ASSESSMENT — PAIN SCALES - GENERAL
PAINLEVEL_OUTOF10: 0 - NO PAIN

## 2025-03-06 ASSESSMENT — PAIN - FUNCTIONAL ASSESSMENT
PAIN_FUNCTIONAL_ASSESSMENT: 0-10

## 2025-03-06 NOTE — PROGRESS NOTES
"ANTEPARTUM PROGRESS NOTE   3/6/2025, 6:00 AM     SUBJECTIVE: No acute events overnight. Patient has no complaints this morning. Denies painful contractions or VB. Continues to have intermittent gushes of clear/yellow fluid which has been stable since admission. Reports normal fetal movement.     OBJECTIVE:   /77 (BP Location: Left arm, Patient Position: Sitting)   Pulse 96   Temp 36.4 °C (97.5 °F) (Temporal)   Resp 16   Ht 1.727 m (5' 8\")   Wt 86.6 kg (190 lb 14.7 oz)   LMP 2024   SpO2 98%   BMI 29.03 kg/m²    Temp  Min: 36.2 °C (97.2 °F)  Max: 36.6 °C (97.9 °F)  Pulse  Min: 93  Max: 101  BP  Min: 99/66  Max: 116/77    General:  AAOx3, No acute distress  Cardiovascular: Warm and well perfused, RRR  Respiratory: Normal respiratory effort, CTAB  Abdominal:  Soft, gravid, non-tender, no rebound or guarding  Extremities: Warm, well perfused    NST: Baseline 125/mod/+accel/-decel  [place]: quiet    Labs:   Lab Results   Component Value Date    WBC 13.9 (H) 2025    HGB 11.1 (L) 2025    HCT 33.6 (L) 2025     2025       ASSESSMENT AND PLAN:     24 y.o.  at 26w6d by 19.3wk US with PPROM.     Oligohydramnios, PPROM  - Hx of oligo since first formal US on  and again on , JIM 4.2 cm. Amniosure positive at OSH prior to transfer. Speculum exam neg x3 on admission to [facility]. Actim Prom negative on . Given high suspicion for PRROM, planning to continue management as such   - Pt continues to have leaking fluid that is mostly clear with yellow   - S/p latency antibiotics completed   - S/p BMZ -  - S/p NICU consult   - Genetic testing collected and negative   - Planning to continue inpatient management until 34.0 wga unless delivery indicated prior for fetal or maternal compromise     Fetal status  - NST AGA this morning, continue daily NSTs while admitted   - Last growth Ultrasound:  EFW 557g, 10%, AC 18%, Breech , next growth for 3/10  - BPP  on 3/3, " JIM 4.8 cm, MVP 2.1, breech     Routine   - Vaginal irritation: wet prep collected positive for yeast; treated 2/19  - GBS negative, 2/10  - BCM: POPs   - 1 hr gtt 99      Dispo: Continue inpatient admission until delivery at 34.0 wks or sooner for fetal or maternal indications     To be seen and discussed with M Attending, Dr. Schultz.     Amy Funes MD PGY1  [facility] Pager [phone]

## 2025-03-07 PROCEDURE — 59025 FETAL NON-STRESS TEST: CPT

## 2025-03-07 PROCEDURE — 2500000001 HC RX 250 WO HCPCS SELF ADMINISTERED DRUGS (ALT 637 FOR MEDICARE OP)

## 2025-03-07 PROCEDURE — 99232 SBSQ HOSP IP/OBS MODERATE 35: CPT

## 2025-03-07 PROCEDURE — 1220000001 HC OB SEMI-PRIVATE ROOM DAILY

## 2025-03-07 PROCEDURE — 59025 FETAL NON-STRESS TEST: CPT | Mod: GC

## 2025-03-07 RX ADMIN — PRENATAL VIT W/ FE FUMARATE-FA TAB 27-0.8 MG 1 TABLET: 27-0.8 TAB at 08:25

## 2025-03-07 ASSESSMENT — PAIN - FUNCTIONAL ASSESSMENT
PAIN_FUNCTIONAL_ASSESSMENT: 0-10

## 2025-03-07 ASSESSMENT — PAIN SCALES - GENERAL
PAINLEVEL_OUTOF10: 0 - NO PAIN

## 2025-03-07 NOTE — CARE PLAN
The patient's goals for the shift include rest    The clinical goals for the shift include no s/s of infection this shift   No s/s of PTL or infection noted this shift. Patient was able to rest. No pads to assess. Patient is currently stable.    Cecy Kamara RN

## 2025-03-07 NOTE — PROGRESS NOTES
"ANTEPARTUM PROGRESS NOTE   3/7/2025, 6:13 AM     SUBJECTIVE: No acute events overnight. Patient has no complaints this morning. Denies painful contractions or VB. Continues to have intermittent gushes of clear/yellow fluid which has been stable since admission. Reports normal fetal movement.     OBJECTIVE:   /68 (BP Location: Left arm, Patient Position: Sitting)   Pulse 76   Temp 36.7 °C (98.1 °F) (Temporal)   Resp 18   Ht 1.727 m (5' 8\")   Wt 86.6 kg (190 lb 14.7 oz)   LMP 2024   SpO2 98%   BMI 29.03 kg/m²    Temp  Min: 36.4 °C (97.5 °F)  Max: 36.7 °C (98.1 °F)  Pulse  Min: 76  Max: 88  BP  Min: 103/66  Max: 109/68    General:  AAOx3, No acute distress  Cardiovascular: Warm and well perfused, RRR  Respiratory: Normal respiratory effort, CTAB  Abdominal:  Soft, gravid, non-tender, no rebound or guarding  Extremities: Warm, well perfused    NST: Baseline 125/mod/+accel/-decel  [place]: quiet    Labs:   No results found for: \"WBC\", \"HGB\", \"HCT\", \"PLT\"      ASSESSMENT AND PLAN:     24 y.o.  at 27w0d by 19.3wk US with PPROM.     Oligohydramnios, PPROM  - Hx of oligo since first formal US on  and again on , JIM 4.2 cm. Amniosure positive at OSH prior to transfer. Speculum exam neg x3 on admission to [facility]. Actim Prom negative on . Given high suspicion for PRROM, planning to continue management as such   - Pt continues to have leaking fluid that is mostly clear with yellow   - S/p latency antibiotics completed   - S/p BMZ -  - S/p NICU consult   - Genetic testing collected and negative   - Planning to continue inpatient management until 34.0 wga unless delivery indicated prior for fetal or maternal compromise     Fetal status  - NST AGA this morning, continue daily NSTs while admitted   - Last growth Ultrasound:  EFW 557g, 10%, AC 18%, Breech , next growth for 3/10  - BPP 8/8 on 3/3, JIM 4.8 cm, MVP 2.1, breech, weekly BPPs     Routine   - Vaginal irritation: wet prep " collected positive for yeast; treated 2/19  - GBS negative, 2/10  - BCM: POPs   - 1 hr gtt 99      Dispo: Continue inpatient admission until delivery at 34.0 wks or sooner for fetal or maternal indications     To be seen and discussed with M Attending, Dr. Radha Funes MD PGY1  [facility] Pager [phone]

## 2025-03-08 PROCEDURE — 1220000001 HC OB SEMI-PRIVATE ROOM DAILY

## 2025-03-08 PROCEDURE — 2500000001 HC RX 250 WO HCPCS SELF ADMINISTERED DRUGS (ALT 637 FOR MEDICARE OP)

## 2025-03-08 PROCEDURE — 99232 SBSQ HOSP IP/OBS MODERATE 35: CPT | Performed by: STUDENT IN AN ORGANIZED HEALTH CARE EDUCATION/TRAINING PROGRAM

## 2025-03-08 PROCEDURE — 59025 FETAL NON-STRESS TEST: CPT | Performed by: STUDENT IN AN ORGANIZED HEALTH CARE EDUCATION/TRAINING PROGRAM

## 2025-03-08 PROCEDURE — 59025 FETAL NON-STRESS TEST: CPT | Mod: GC | Performed by: STUDENT IN AN ORGANIZED HEALTH CARE EDUCATION/TRAINING PROGRAM

## 2025-03-08 RX ADMIN — PRENATAL VIT W/ FE FUMARATE-FA TAB 27-0.8 MG 1 TABLET: 27-0.8 TAB at 08:30

## 2025-03-08 ASSESSMENT — PAIN SCALES - GENERAL
PAINLEVEL_OUTOF10: 0 - NO PAIN

## 2025-03-08 ASSESSMENT — PAIN - FUNCTIONAL ASSESSMENT
PAIN_FUNCTIONAL_ASSESSMENT: 0-10

## 2025-03-08 NOTE — PROGRESS NOTES
"ANTEPARTUM PROGRESS NOTE   3/8/2025, 7:00 AM     SUBJECTIVE: Patient doing well with no change in vaginal discharge, no bleeding. She has no f/c. She has no vb,  no ctx, and feels good FM.      OBJECTIVE:   /66   Pulse 77   Temp 36.5 °C (97.7 °F) (Temporal)   Resp 18   Ht 1.727 m (5' 8\")   Wt 86.6 kg (190 lb 14.7 oz)   LMP 2024   SpO2 99%   BMI 29.03 kg/m²    Temp  Min: 36.4 °C (97.5 °F)  Max: 36.6 °C (97.9 °F)  Pulse  Min: 71  Max: 92  BP  Min: 105/59  Max: 115/71    General:  AAOx3, No acute distress  Cardiovascular: Warm and well perfused, RRR  Respiratory: Normal respiratory effort, CTAB  Abdominal:  Soft, gravid, non-tender, no rebound or guarding  Extremities: Warm, well perfused    NST: Baseline 125/mod/+accel/-decel  [place]: quiet    Labs:   No results found for: \"WBC\", \"HGB\", \"HCT\", \"PLT\"      ASSESSMENT AND PLAN:     24 y.o.  at 27w1d by 19.3wk US with PPROM.     PPROM  - Hx of oligo since first formal US on  and again on , JIM 4.2 cm. Amniosure positive at OSH prior to transfer. Speculum exam neg x3 on admission to [facility]. Actim Prom negative on . Given high suspicion for PRROM, planning to continue management as such   - S/p latency antibiotics completed   - S/p BMZ -  - S/p NICU consult   - Genetic testing collected and negative   - Planning to continue inpatient management until 34.0 wga unless delivery indicated prior for fetal or maternal compromise     Fetal status  - NST AGA this morning, continue daily NSTs while admitted   - Last growth Ultrasound:  EFW 557g, 10%, AC 18%, Breech , next growth for 3/10  - BPP 8/8 on 3/3, JIM 4.8 cm, MVP 2.1, breech, weekly BPPs     Routine   - Vaginal irritation: wet prep collected positive for yeast; treated   - GBS negative, 2/10  - BCM: POPs   - 1 hr gtt 99      Dispo: Continue inpatient admission until delivery at 34.0 wks or sooner for fetal or maternal indications     To be seen and discussed with MFM " Attending, Dr. Radha Leger MD PGY4  [facility] Pager [phone]

## 2025-03-08 NOTE — CARE PLAN
The patient's goals for the shift include Rest    The clinical goals for the shift include no s/sx of infection    Patient remained free from falls/injury throughout shift. VSS and no s/sx of infection at this time. Patient denies n/v, fever, or chills. FHR remained reassuring with spot check throughout shift. No changes in amniotic fluid throughout shift, and patient declines abd tenderness, ctx or cramping, and vaginal bleeding. Patient resting comfortably in bed and denies needs at this time.

## 2025-03-09 VITALS
HEART RATE: 99 BPM | WEIGHT: 190.92 LBS | OXYGEN SATURATION: 99 % | TEMPERATURE: 96.8 F | SYSTOLIC BLOOD PRESSURE: 113 MMHG | HEIGHT: 68 IN | RESPIRATION RATE: 18 BRPM | DIASTOLIC BLOOD PRESSURE: 70 MMHG | BODY MASS INDEX: 28.94 KG/M2

## 2025-03-09 LAB
ABO GROUP (TYPE) IN BLOOD: NORMAL
ANTIBODY SCREEN: NORMAL
RH FACTOR (ANTIGEN D): NORMAL

## 2025-03-09 PROCEDURE — 86901 BLOOD TYPING SEROLOGIC RH(D): CPT

## 2025-03-09 PROCEDURE — 36415 COLL VENOUS BLD VENIPUNCTURE: CPT

## 2025-03-09 PROCEDURE — 99232 SBSQ HOSP IP/OBS MODERATE 35: CPT

## 2025-03-09 PROCEDURE — 1220000001 HC OB SEMI-PRIVATE ROOM DAILY

## 2025-03-09 PROCEDURE — 59025 FETAL NON-STRESS TEST: CPT

## 2025-03-09 PROCEDURE — 2500000001 HC RX 250 WO HCPCS SELF ADMINISTERED DRUGS (ALT 637 FOR MEDICARE OP)

## 2025-03-09 PROCEDURE — 59025 FETAL NON-STRESS TEST: CPT | Mod: GC

## 2025-03-09 RX ADMIN — PRENATAL VIT W/ FE FUMARATE-FA TAB 27-0.8 MG 1 TABLET: 27-0.8 TAB at 09:01

## 2025-03-09 ASSESSMENT — PAIN - FUNCTIONAL ASSESSMENT
PAIN_FUNCTIONAL_ASSESSMENT: 0-10

## 2025-03-09 ASSESSMENT — PAIN SCALES - GENERAL
PAINLEVEL_OUTOF10: 0 - NO PAIN

## 2025-03-09 NOTE — PROGRESS NOTES
"ANTEPARTUM PROGRESS NOTE   3/9/2025, 6:37 AM     SUBJECTIVE: Patient doing well with no change in vaginal discharge, no bleeding. She has no f/c. She has no VB, no ctx, and feels good fetal movement.       OBJECTIVE:   /72   Pulse 73   Temp 36.5 °C (97.7 °F) (Temporal)   Resp 18   Ht 1.727 m (5' 8\")   Wt 86.6 kg (190 lb 14.7 oz)   LMP 2024   SpO2 97%   BMI 29.03 kg/m²    Temp  Min: 36.5 °C (97.7 °F)  Max: 37 °C (98.6 °F)  Pulse  Min: 73  Max: 90  BP  Min: 115/64  Max: 124/72    General:  AAOx3, No acute distress  Cardiovascular: Warm and well perfused, RRR  Respiratory: Normal respiratory effort, CTAB  Abdominal:  Soft, gravid, non-tender, no rebound or guarding  Extremities: Warm, well perfused    NST: Baseline 135 mod/+accel/-decel  [place]: quiet    Labs:   No results found for: \"WBC\", \"HGB\", \"HCT\", \"PLT\"      ASSESSMENT AND PLAN:     24 y.o.  at 27w2d by 19.3wk US with PPROM.     PPROM  - Hx of oligo since first formal US on  and again on , JIM 4.2 cm. Amniosure positive at OSH prior to transfer. Speculum exam neg x3 on admission to [facility]. Actim Prom negative on . Given high suspicion for PRROM, planning to continue management as such   - S/p latency antibiotics completed   - S/p BMZ -  - S/p NICU consult   - Genetic testing collected and negative   - Planning to continue inpatient management until 34.0 wga unless delivery indicated prior for fetal or maternal compromise     Fetal status  - NST AGA this morning, continue daily NSTs while admitted   - Last growth Ultrasound:  EFW 557g, 10%, AC 18%, Breech , next growth for 3/10  - BPP 8/8 on 3/3, JIM 4.8 cm, MVP 2.1, breech, weekly BPPs     Routine   - Vaginal irritation: wet prep collected positive for yeast; treated 2/19  - GBS negative, 2/10  - BCM: POPs   - 1 hr gtt 99      Dispo: Continue inpatient admission until delivery at 34.0 wks or sooner for fetal or maternal indications     To be seen and discussed with " M Attending, Dr. Radha Funes MD PGY4  [facility] Pager [phone]

## 2025-03-09 NOTE — CARE PLAN
The patient's goals for the shift include Rest    The clinical goals for the shift include no s/sx of infection

## 2025-03-09 NOTE — CARE PLAN
VSS t/o shift. No s/sx infection. No ctx, cramping, or bleeding. LOF changed from small amount/nonfoul/clear-yellow to none. FHR WDL during spot checks.       Problem: Antepartum  Goal: Maintain pregnancy as long as maternal and/or fetal condition is stable  Outcome: Progressing  Goal: Avoid/minimize constipation  Outcome: Progressing  Goal: No decrease in circulation/VTE  Outcome: Progressing  Goal: FHR remains reassuring  Outcome: Progressing  Goal: Minimize anxiety/maximize coping  Outcome: Progressing     Problem: Safety - Adult  Goal: Free from fall injury  Outcome: Progressing     Problem:  Labor/Prolonged Premature Rupture of Membranes  Goal: Fewer then 4-6 ct per hour  Outcome: Progressing  Goal: No s/sx of IAI  Outcome: Progressing

## 2025-03-09 NOTE — CARE PLAN
The patient's goals for the shift include rest    The clinical goals for the shift include no s/sx of infection    Pt is doing well throughout the night and vss. Pt denies of bleeding or contractions. Pt has had scant leaking throughout the night and no pads to assess. Pt endorses good fetal movement. Pt is resting comfortably and has no other complaints at this time.

## 2025-03-10 ENCOUNTER — APPOINTMENT (OUTPATIENT)
Dept: RADIOLOGY | Facility: HOSPITAL | Age: 25
End: 2025-03-10
Payer: COMMERCIAL

## 2025-03-10 LAB
BASOPHILS # BLD AUTO: 0.04 X10*3/UL (ref 0–0.1)
BASOPHILS NFR BLD AUTO: 0.3 %
EOSINOPHIL # BLD AUTO: 0.14 X10*3/UL (ref 0–0.7)
EOSINOPHIL NFR BLD AUTO: 1 %
ERYTHROCYTE [DISTWIDTH] IN BLOOD BY AUTOMATED COUNT: 12.5 % (ref 11.5–14.5)
HCT VFR BLD AUTO: 34.3 % (ref 36–46)
HGB BLD-MCNC: 11.3 G/DL (ref 12–16)
IMM GRANULOCYTES # BLD AUTO: 0.23 X10*3/UL (ref 0–0.7)
IMM GRANULOCYTES NFR BLD AUTO: 1.6 % (ref 0–0.9)
LYMPHOCYTES # BLD AUTO: 2.09 X10*3/UL (ref 1.2–4.8)
LYMPHOCYTES NFR BLD AUTO: 14.4 %
MCH RBC QN AUTO: 30.8 PG (ref 26–34)
MCHC RBC AUTO-ENTMCNC: 32.9 G/DL (ref 32–36)
MCV RBC AUTO: 94 FL (ref 80–100)
MONOCYTES # BLD AUTO: 1.1 X10*3/UL (ref 0.1–1)
MONOCYTES NFR BLD AUTO: 7.6 %
NEUTROPHILS # BLD AUTO: 10.91 X10*3/UL (ref 1.2–7.7)
NEUTROPHILS NFR BLD AUTO: 75.1 %
NRBC BLD-RTO: 0 /100 WBCS (ref 0–0)
PLATELET # BLD AUTO: 193 X10*3/UL (ref 150–450)
RBC # BLD AUTO: 3.67 X10*6/UL (ref 4–5.2)
WBC # BLD AUTO: 14.5 X10*3/UL (ref 4.4–11.3)

## 2025-03-10 PROCEDURE — 59025 FETAL NON-STRESS TEST: CPT | Mod: GC

## 2025-03-10 PROCEDURE — 76816 OB US FOLLOW-UP PER FETUS: CPT | Performed by: OBSTETRICS & GYNECOLOGY

## 2025-03-10 PROCEDURE — 1220000001 HC OB SEMI-PRIVATE ROOM DAILY

## 2025-03-10 PROCEDURE — 99232 SBSQ HOSP IP/OBS MODERATE 35: CPT

## 2025-03-10 PROCEDURE — 59025 FETAL NON-STRESS TEST: CPT

## 2025-03-10 PROCEDURE — 76819 FETAL BIOPHYS PROFIL W/O NST: CPT | Performed by: OBSTETRICS & GYNECOLOGY

## 2025-03-10 PROCEDURE — 76816 OB US FOLLOW-UP PER FETUS: CPT

## 2025-03-10 PROCEDURE — 76820 UMBILICAL ARTERY ECHO: CPT | Performed by: OBSTETRICS & GYNECOLOGY

## 2025-03-10 PROCEDURE — 76820 UMBILICAL ARTERY ECHO: CPT

## 2025-03-10 PROCEDURE — 85025 COMPLETE CBC W/AUTO DIFF WBC: CPT

## 2025-03-10 PROCEDURE — 2500000001 HC RX 250 WO HCPCS SELF ADMINISTERED DRUGS (ALT 637 FOR MEDICARE OP)

## 2025-03-10 PROCEDURE — 36415 COLL VENOUS BLD VENIPUNCTURE: CPT

## 2025-03-10 RX ADMIN — PRENATAL VIT W/ FE FUMARATE-FA TAB 27-0.8 MG 1 TABLET: 27-0.8 TAB at 08:15

## 2025-03-10 ASSESSMENT — PAIN SCALES - GENERAL
PAINLEVEL_OUTOF10: 0 - NO PAIN

## 2025-03-10 ASSESSMENT — PAIN - FUNCTIONAL ASSESSMENT
PAIN_FUNCTIONAL_ASSESSMENT: 0-10

## 2025-03-10 NOTE — PROGRESS NOTES
"ANTEPARTUM PROGRESS NOTE   3/10/2025, 7:10 AM     SUBJECTIVE: Patient doing well with no change in vaginal discharge, no bleeding. She has no f/c. She has no VB, no ctx, and feels good fetal movement.       OBJECTIVE:   /70   Pulse 99   Temp 36 °C (96.8 °F) (Temporal)   Resp 18   Ht 1.727 m (5' 8\")   Wt 86.6 kg (190 lb 14.7 oz)   LMP 2024   SpO2 99%   BMI 29.03 kg/m²    Temp  Min: 36 °C (96.8 °F)  Max: 36.7 °C (98.1 °F)  Pulse  Min: 78  Max: 99  BP  Min: 100/67  Max: 128/71    General:  AAOx3, No acute distress  Cardiovascular: Warm and well perfused, RRR  Respiratory: Normal respiratory effort, CTAB  Abdominal:  Soft, gravid, non-tender, no rebound or guarding  Extremities: Warm, well perfused    NST: Baseline 135 mod/+accel/-decel  [place]: quiet    Labs:   No results found for: \"WBC\", \"HGB\", \"HCT\", \"PLT\"      ASSESSMENT AND PLAN:     24 y.o.  at 27w3d by 19.3wk US with PPROM.     PPROM  - Hx of oligo since first formal US on  and again on , JIM 4.2 cm. Amniosure positive at OSH prior to transfer. Speculum exam neg x3 on admission to [facility]. Actim Prom negative on . Given high suspicion for PRROM, planning to continue management as such   - S/p latency antibiotics completed   - S/p BMZ -  - S/p NICU consult   - Genetic testing collected and negative   - Planning to continue inpatient management until 34.0 wga unless delivery indicated prior for fetal or maternal compromise     Fetal status  - NST AGA this morning, continue daily NSTs while admitted   - Last growth Ultrasound:  EFW 557g, 10%, AC 18%, Breech , next growth for 3/10  - BPP 8/8 on 3/3, JIM 4.8 cm, MVP 2.1, breech, weekly BPPs     Routine   - Vaginal irritation: wet prep collected positive for yeast; treated   - GBS negative, 2/10  - BCM: POPs   - 1 hr gtt 99      Dispo: Continue inpatient admission until delivery at 34.0 wks or sooner for fetal or maternal indications     To be seen and discussed with " M Attending, Dr. Radha Quijano MD PGY2  [facility] Pager [phone]

## 2025-03-10 NOTE — CARE PLAN
VSS t/o shift. No s/sx infection. No ctx, cramping, or bleeding. LOF remained the same - clear/yellow, small amount, nonfoul. FHR WDL during spot checks.       Problem: Antepartum  Goal: Maintain pregnancy as long as maternal and/or fetal condition is stable  Outcome: Progressing  Goal: Avoid/minimize constipation  Outcome: Progressing  Goal: No decrease in circulation/VTE  Outcome: Progressing  Goal: FHR remains reassuring  Outcome: Progressing  Goal: Minimize anxiety/maximize coping  Outcome: Progressing     Problem: Safety - Adult  Goal: Free from fall injury  Outcome: Progressing     Problem:  Labor/Prolonged Premature Rupture of Membranes  Goal: Fewer then 4-6 ct per hour  Outcome: Progressing  Goal: No s/sx of IAI  Outcome: Progressing

## 2025-03-11 PROCEDURE — 1220000001 HC OB SEMI-PRIVATE ROOM DAILY

## 2025-03-11 PROCEDURE — 59025 FETAL NON-STRESS TEST: CPT | Mod: GC

## 2025-03-11 PROCEDURE — 2500000001 HC RX 250 WO HCPCS SELF ADMINISTERED DRUGS (ALT 637 FOR MEDICARE OP)

## 2025-03-11 PROCEDURE — 99232 SBSQ HOSP IP/OBS MODERATE 35: CPT

## 2025-03-11 PROCEDURE — 59025 FETAL NON-STRESS TEST: CPT

## 2025-03-11 RX ADMIN — PRENATAL VIT W/ FE FUMARATE-FA TAB 27-0.8 MG 1 TABLET: 27-0.8 TAB at 08:17

## 2025-03-11 ASSESSMENT — PAIN SCALES - GENERAL
PAINLEVEL_OUTOF10: 0 - NO PAIN

## 2025-03-11 ASSESSMENT — PAIN - FUNCTIONAL ASSESSMENT
PAIN_FUNCTIONAL_ASSESSMENT: 0-10

## 2025-03-11 NOTE — PROGRESS NOTES
"ANTEPARTUM PROGRESS NOTE   3/11/2025, 6:56 AM     SUBJECTIVE:  No acute events overnight. Patient has no complaints this morning. Denies painful contractions, VB, discharge, abdominal pain. Reports normal fetal movement.     OBJECTIVE:   /76   Pulse 95   Temp 37 °C (98.6 °F) (Temporal)   Resp 18   Ht 1.727 m (5' 8\")   Wt 86.6 kg (190 lb 14.7 oz)   LMP 2024   SpO2 96%   BMI 29.03 kg/m²    Temp  Min: 36.4 °C (97.5 °F)  Max: 37 °C (98.6 °F)  Pulse  Min: 92  Max: 95  BP  Min: 103/63  Max: 112/71    General:  AAOx3, No acute distress  Cardiovascular: Warm and well perfused  Respiratory: Normal respiratory effort   Abdominal:  Soft, gravid, non-tender, no rebound or guarding  Extremities: Warm, well perfused    NST: Baseline 130/ mod variability /pos accels/ neg decels  [place]: acontractile     Labs:   Labs in chart were reviewed.    ASSESSMENT AND PLAN:     24 y.o.  at 27w4d by 19.3wk US with PPROM.     PPROM  - Hx of oligo since first formal US on  and again on , JIM 4.2 cm. Amniosure positive at OSH prior to transfer. Speculum exam neg x3 on admission to [facility]. Actim Prom negative on . Given high suspicion for PRROM, planning to continue management as such   - S/p latency antibiotics completed   - S/p BMZ -  - S/p NICU consult   - Genetic testing collected and negative   - Planning to continue inpatient management until 34.0 wga unless delivery indicated prior for fetal or maternal compromise     Fetal status  - NST AGA this morning, continue daily NSTs while admitted   - Last growth Ultrasound:  EFW 557g, 10%, AC 18%, Breech , next growth for 3/10  - BPP 8/8 on 3/3, JIM 4.8 cm, MVP 2.1, breech, weekly BPPs     Routine   - Vaginal irritation: wet prep collected positive for yeast; treated   - GBS negative, 2/10  - BCM: POPs   - 1 hr gtt 99      Dispo: Continue inpatient admission until delivery at 34.0 wks or sooner for fetal or maternal indications     To be seen " and discussed with MFM Attending, Dr. Radha Gallardo MD PGY3  M Pager [phone]

## 2025-03-12 LAB
ABO GROUP (TYPE) IN BLOOD: NORMAL
ANTIBODY SCREEN: NORMAL
APTT PPP: 24 SECONDS (ref 26–36)
FIBRINOGEN PPP-MCNC: 408 MG/DL (ref 200–400)
INR PPP: 0.9 (ref 0.9–1.1)
PROTHROMBIN TIME: 9.7 SECONDS (ref 9.8–12.4)
RH FACTOR (ANTIGEN D): NORMAL

## 2025-03-12 PROCEDURE — 85610 PROTHROMBIN TIME: CPT

## 2025-03-12 PROCEDURE — 59025 FETAL NON-STRESS TEST: CPT

## 2025-03-12 PROCEDURE — 36415 COLL VENOUS BLD VENIPUNCTURE: CPT

## 2025-03-12 PROCEDURE — 2500000001 HC RX 250 WO HCPCS SELF ADMINISTERED DRUGS (ALT 637 FOR MEDICARE OP)

## 2025-03-12 PROCEDURE — 59025 FETAL NON-STRESS TEST: CPT | Mod: GC

## 2025-03-12 PROCEDURE — 1220000001 HC OB SEMI-PRIVATE ROOM DAILY

## 2025-03-12 PROCEDURE — 85384 FIBRINOGEN ACTIVITY: CPT

## 2025-03-12 PROCEDURE — 86901 BLOOD TYPING SEROLOGIC RH(D): CPT

## 2025-03-12 PROCEDURE — 99232 SBSQ HOSP IP/OBS MODERATE 35: CPT

## 2025-03-12 RX ADMIN — PRENATAL VIT W/ FE FUMARATE-FA TAB 27-0.8 MG 1 TABLET: 27-0.8 TAB at 08:45

## 2025-03-12 ASSESSMENT — PAIN SCALES - GENERAL
PAINLEVEL_OUTOF10: 0 - NO PAIN

## 2025-03-12 ASSESSMENT — PAIN - FUNCTIONAL ASSESSMENT
PAIN_FUNCTIONAL_ASSESSMENT: 0-10

## 2025-03-12 NOTE — PROCEDURES
Afternoon Nonstress Test:   Baseline 120 bpm  Variability: moderate  Accelerations: yes, to 155 bpm  Decelerations: none  [place]: acontractile    Reactive and reassuring tracing this afternoon. Continue Daily NSTs.     Vaelria Odonnell MD

## 2025-03-12 NOTE — PROGRESS NOTES
"ANTEPARTUM PROGRESS NOTE   3/12/2025, 6:13 AM     SUBJECTIVE:  No acute events overnight. Patient has no complaints this morning. Denies painful contractions, VB, discharge, abdominal pain. Reports normal fetal movement. Reports continued leakage of yellow fluid, without odor. Reports sometimes when the monitor is placed on her lower abdomen feels tenderness, but it is intermittent. Denies fever or chills.     OBJECTIVE:   /74   Pulse 96   Temp 37.4 °C (99.3 °F) (Temporal)   Resp 16   Ht 1.727 m (5' 8\")   Wt 86.6 kg (190 lb 14.7 oz)   LMP 2024   SpO2 97%   BMI 29.03 kg/m²    Temp  Min: 37 °C (98.6 °F)  Max: 37.4 °C (99.3 °F)  Pulse  Min: 87  Max: 96  BP  Min: 102/74  Max: 112/72    General:  AAOx3, No acute distress  Cardiovascular: Warm and well perfused, RRR  Respiratory: Normal respiratory effort, CTAB  Abdominal:  Soft, gravid, non-tender, no rebound or guarding  Extremities: Warm, well perfused    NST: Baseline 130/ mod variability /pos accels/ 1 decels @525, but kept on the monitor afterwards and overall reactive and reassuring  [place]: acontractile     Labs:   Labs in chart were reviewed.    ASSESSMENT AND PLAN:     24 y.o.  at 27w4d by 19.3wk US with PPROM.     PPROM  - Hx of oligo since first formal US on  and again on , JIM 4.2 cm. Amniosure positive at OSH prior to transfer. Speculum exam neg x3 on admission to [facility]. Actim Prom negative on . Given high suspicion for PRROM, planning to continue management as such   - S/p latency antibiotics completed   - S/p BMZ -  - S/p NICU consult   - fu AM abruption labs  - Genetic testing collected and negative   - Planning to continue inpatient management until 34.0 wga unless delivery indicated prior for fetal or maternal compromise     Fetal status  Fetal Growth Restriction  - NST with one decel but reactive this morning, can plan for PM NST   - continue daily NSTs while admitted   - Last growth Ultrasound: 3/10 EFW " 912g 8%, AC 10% w/normal dopplers  - breech 3/10, weekly BPPs     Routine   - Vaginal irritation: wet prep collected positive for yeast; treated 2/19  - GBS negative, 2/10  - BCM: POPs   - 1 hr gtt 99      Dispo: Continue inpatient admission until delivery at 34.0 wks or sooner for fetal or maternal indications     To be seen and discussed with [facility] Attending, Dr. Radha Funes MD PGY1  [facility] Pager [phone]

## 2025-03-13 PROCEDURE — 1220000001 HC OB SEMI-PRIVATE ROOM DAILY

## 2025-03-13 PROCEDURE — 59025 FETAL NON-STRESS TEST: CPT

## 2025-03-13 PROCEDURE — 2500000001 HC RX 250 WO HCPCS SELF ADMINISTERED DRUGS (ALT 637 FOR MEDICARE OP)

## 2025-03-13 PROCEDURE — 59025 FETAL NON-STRESS TEST: CPT | Mod: GC

## 2025-03-13 PROCEDURE — 99232 SBSQ HOSP IP/OBS MODERATE 35: CPT

## 2025-03-13 RX ADMIN — PRENATAL VIT W/ FE FUMARATE-FA TAB 27-0.8 MG 1 TABLET: 27-0.8 TAB at 09:28

## 2025-03-13 ASSESSMENT — PAIN SCALES - GENERAL
PAINLEVEL_OUTOF10: 0 - NO PAIN

## 2025-03-13 ASSESSMENT — PAIN - FUNCTIONAL ASSESSMENT
PAIN_FUNCTIONAL_ASSESSMENT: 0-10
PAIN_FUNCTIONAL_ASSESSMENT: 0-10

## 2025-03-13 NOTE — CARE PLAN
Problem: Antepartum  Goal: Maintain pregnancy as long as maternal and/or fetal condition is stable  Outcome: Progressing  Goal: Avoid/minimize constipation  Outcome: Progressing  Goal: No decrease in circulation/VTE  Outcome: Progressing  Goal: FHR remains reassuring  Outcome: Progressing  Goal: Minimize anxiety/maximize coping  Outcome: Progressing     Problem: Safety - Adult  Goal: Free from fall injury  Outcome: Progressing     Problem:  Labor/Prolonged Premature Rupture of Membranes  Goal: Fewer then 4-6 ct per hour  Outcome: Progressing  Goal: No s/sx of IAI  Outcome: Progressing     The clinical goals for the shift include no s/sx of infection    Over the shift, the patient did make progress toward the following goals.

## 2025-03-13 NOTE — PROGRESS NOTES
"ANTEPARTUM PROGRESS NOTE   3/13/2025, 6:39 AM     SUBJECTIVE:  No acute events overnight. Patient has no complaints this morning. Denies painful contractions, VB, vaginal discharge. Some LOF that is still clear/yellow. Yesterday had new abdominal pain but states it is much better now, rarely present and is very mild. Reports normal fetal movement.     OBJECTIVE:   /71 (BP Location: Left arm, Patient Position: Lying)   Pulse 92   Temp 36.6 °C (97.9 °F) (Temporal)   Resp 16   Ht 1.727 m (5' 8\")   Wt 86.6 kg (190 lb 14.7 oz)   LMP 2024   SpO2 98%   BMI 29.03 kg/m²    Temp  Min: 36.4 °C (97.5 °F)  Max: 36.9 °C (98.4 °F)  Pulse  Min: 85  Max: 104  BP  Min: 101/71  Max: 112/73    General:  AAOx3, No acute distress  Cardiovascular: Warm and well perfused  Respiratory: Normal respiratory effort   Abdominal:  Soft, gravid, non-tender, no rebound or guarding  Extremities: Warm, well perfused    NST: Baseline 125/ mod variability /pos accels/ neg decels  [place]: acontractile     Labs:   No results found for: \"WBC\", \"HGB\", \"HCT\", \"PLT\"  No results found for: \"GLUCOSE\", \"NA\", \"K\", \"CL\", \"CO2\", \"ANIONGAP\", \"BUN\", \"CREATININE\", \"EGFR\", \"CALCIUM\", \"ALBUMIN\", \"PROT\", \"ALKPHOS\", \"ALT\", \"AST\", \"BILITOT\"    ASSESSMENT AND PLAN:     24 y.o.  at 27w6d by 19.3wk US with PPROM.     PPROM  - Hx of oligo since first formal US on  and again on , JIM 4.2 cm. Amniosure positive at OSH prior to transfer. Speculum exam neg x3 on admission to [facility]. Actim Prom negative on . Given high suspicion for PRROM, planning to continue management as such   - S/p latency antibiotics completed   - S/p BMZ -  - S/p NICU consult   - Abruption labs obtained 3/12 due to new abdominal pain, were wnl. Now abdominal pain significantly improved. No VB. No evidence of abruption at this time. Suspect pain is round ligament pain.  - Genetic testing collected and negative   - Planning to continue inpatient management until " 34.0 wga unless delivery indicated prior for fetal or maternal compromise     Fetal status  Fetal Growth Restriction  - NST reactive this AM  - continue daily NSTs while admitted   - Last growth Ultrasound: 3/10 EFW 912g 8%, AC 10% w/normal dopplers  - breech 3/10, weekly BPPs     Routine   - Vaginal irritation: wet prep collected positive for yeast; treated 2/19  - GBS negative, 2/10  - BCM: POPs   - 1 hr gtt 99      Dispo: Continue inpatient admission until delivery at 34.0 wks or sooner for fetal or maternal indications     To be seen and discussed with M Attending, Dr. Radha Gallardo MD PGY3  [facility] Pager [phone]

## 2025-03-14 PROCEDURE — 59025 FETAL NON-STRESS TEST: CPT | Mod: GC

## 2025-03-14 PROCEDURE — 1220000001 HC OB SEMI-PRIVATE ROOM DAILY

## 2025-03-14 PROCEDURE — 99232 SBSQ HOSP IP/OBS MODERATE 35: CPT

## 2025-03-14 PROCEDURE — 59025 FETAL NON-STRESS TEST: CPT

## 2025-03-14 PROCEDURE — 2500000001 HC RX 250 WO HCPCS SELF ADMINISTERED DRUGS (ALT 637 FOR MEDICARE OP)

## 2025-03-14 RX ADMIN — PRENATAL VIT W/ FE FUMARATE-FA TAB 27-0.8 MG 1 TABLET: 27-0.8 TAB at 09:40

## 2025-03-14 ASSESSMENT — PAIN SCALES - GENERAL
PAINLEVEL_OUTOF10: 0 - NO PAIN

## 2025-03-14 ASSESSMENT — PAIN - FUNCTIONAL ASSESSMENT
PAIN_FUNCTIONAL_ASSESSMENT: 0-10

## 2025-03-14 NOTE — PROGRESS NOTES
"ANTEPARTUM PROGRESS NOTE   3/14/2025, 6:10 AM     SUBJECTIVE:  No acute events overnight. Patient has no complaints this morning. Denies painful contractions, VB, vaginal discharge. Some LOF that is still clear/yellow. Previously had new abdominal pain but states it is much better now, rarely present and mild in nature. Reports normal fetal movement.     OBJECTIVE:   /68   Pulse 94   Temp 36.7 °C (98.1 °F) (Temporal)   Resp 18   Ht 1.727 m (5' 8\")   Wt 86.6 kg (190 lb 14.7 oz)   LMP 2024   SpO2 96%   BMI 29.03 kg/m²    Temp  Min: 36.7 °C (98.1 °F)  Max: 36.8 °C (98.2 °F)  Pulse  Min: 80  Max: 94  BP  Min: 103/68  Max: 110/72    General:  AAOx3, No acute distress  Cardiovascular: Warm and well perfused, RRR  Respiratory: Normal respiratory effort, CTAB  Abdominal:  Soft, gravid, non-tender, no rebound or guarding  Extremities: Warm, well perfused    NST: Baseline 125/ mod variability /pos accels/ neg decels  [place]: acontractile     Labs:   No results found for: \"WBC\", \"HGB\", \"HCT\", \"PLT\"  No results found for: \"GLUCOSE\", \"NA\", \"K\", \"CL\", \"CO2\", \"ANIONGAP\", \"BUN\", \"CREATININE\", \"EGFR\", \"CALCIUM\", \"ALBUMIN\", \"PROT\", \"ALKPHOS\", \"ALT\", \"AST\", \"BILITOT\"    ASSESSMENT AND PLAN:     24 y.o.  at 28w0d by 19.3wk US with PPROM.     PPROM  - Hx of oligo since first formal US on  and again on , JIM 4.2 cm. Amniosure positive at OSH prior to transfer. Speculum exam neg x3 on admission to [facility]. Actim Prom negative on . Given high suspicion for PRROM, planning to continue management as such   - S/p latency antibiotics completed   - S/p BMZ -  - S/p NICU consult   - Abruption labs obtained 3/12 due to new abdominal pain, were wnl. Now abdominal pain significantly improved. No VB. No evidence of abruption at this time. Suspect pain is round ligament pain.  - Genetic testing collected and negative   - Planning to continue inpatient management until 34.0 wga unless delivery indicated prior " for fetal or maternal compromise     Fetal status  Fetal Growth Restriction  - NST reactive this AM  - continue daily NSTs while admitted   - Last growth Ultrasound: 3/10 EFW 912g 8%, AC 10% w/normal dopplers  - breech 3/10, weekly BPPs     Routine   - Vaginal irritation: wet prep collected positive for yeast; treated 2/19  - GBS negative, 2/10  - BCM: POPs   - 1 hr gtt 99      Dispo: Continue inpatient admission until delivery at 34.0 wks or sooner for fetal or maternal indications     To be seen and discussed with M Attending, Dr. Elbert Funes MD PGY1  [facility] Pager [phone]

## 2025-03-14 NOTE — CARE PLAN
The patient's goals for the shift include remain pregnant; rest    The clinical goals for the shift include remain free of s/sx PTL

## 2025-03-15 LAB
ABO GROUP (TYPE) IN BLOOD: NORMAL
ANTIBODY SCREEN: NORMAL
RH FACTOR (ANTIGEN D): NORMAL

## 2025-03-15 PROCEDURE — 86901 BLOOD TYPING SEROLOGIC RH(D): CPT

## 2025-03-15 PROCEDURE — 36415 COLL VENOUS BLD VENIPUNCTURE: CPT

## 2025-03-15 PROCEDURE — 2500000001 HC RX 250 WO HCPCS SELF ADMINISTERED DRUGS (ALT 637 FOR MEDICARE OP)

## 2025-03-15 PROCEDURE — 99233 SBSQ HOSP IP/OBS HIGH 50: CPT

## 2025-03-15 PROCEDURE — 1220000001 HC OB SEMI-PRIVATE ROOM DAILY

## 2025-03-15 PROCEDURE — 59025 FETAL NON-STRESS TEST: CPT | Mod: GC

## 2025-03-15 PROCEDURE — 59025 FETAL NON-STRESS TEST: CPT

## 2025-03-15 RX ADMIN — CALCIUM CARBONATE (ANTACID) CHEW TAB 500 MG 500 MG: 500 CHEW TAB at 16:23

## 2025-03-15 RX ADMIN — PRENATAL VIT W/ FE FUMARATE-FA TAB 27-0.8 MG 1 TABLET: 27-0.8 TAB at 08:47

## 2025-03-15 ASSESSMENT — PAIN - FUNCTIONAL ASSESSMENT
PAIN_FUNCTIONAL_ASSESSMENT: 0-10

## 2025-03-15 ASSESSMENT — PAIN SCALES - GENERAL
PAINLEVEL_OUTOF10: 0 - NO PAIN

## 2025-03-15 NOTE — CARE PLAN
The patient's goals for the shift include remain pregnant    The clinical goals for the shift include no s/sx of infection    Over the shift, the patient did make progress toward the following goals. Barriers to progression include none. Recommendations to address these barriers include continue jeanie care and pad changes if leaking.

## 2025-03-15 NOTE — PROGRESS NOTES
"ANTEPARTUM PROGRESS NOTE   3/15/2025, 6:29 AM     SUBJECTIVE:  No acute events overnight. Patient has no complaints this morning. Denies painful contractions, VB, vaginal discharge. Some LOF that is still clear/yellow. Previously had new abdominal pain but states it is much better now, rarely present and mild in nature. Denies any abdominal pain this AM. Reports normal fetal movement.     OBJECTIVE:   /73 (BP Location: Left arm, Patient Position: Lying)   Pulse 99   Temp 36.9 °C (98.4 °F) (Temporal)   Resp 18   Ht 1.727 m (5' 8\")   Wt 86.6 kg (190 lb 14.7 oz)   LMP 2024   SpO2 99%   BMI 29.03 kg/m²    Temp  Min: 36.3 °C (97.3 °F)  Max: 36.9 °C (98.4 °F)  Pulse  Min: 78  Max: 99  BP  Min: 111/73  Max: 114/74    General:  AAOx3, No acute distress  Cardiovascular: Warm and well perfused, RRR  Respiratory: Normal respiratory effort, CTAB  Abdominal:  Soft, gravid, non-tender, no rebound or guarding  Extremities: Warm, well perfused    NST: Baseline 130/ mod variability /pos accels/ neg decels  [employer]: quiet     Labs:   Lab Results   Component Value Date    HGB 11.3 (L) 03/10/2025    WBC 14.5 (H) 03/10/2025    FIBRINOGEN 408 (H) 2025    INR 0.9 2025      ASSESSMENT AND PLAN:     24 y.o.  at 28w1d by 19.3wk US with PPROM.     PPROM  - Hx of oligo since first formal US on  and again on , JIM 4.2 cm. Amniosure positive at OSH prior to transfer. Speculum exam neg x3 on admission to [facility]. Actim Prom negative on . Given high suspicion for PRROM, planning to continue management as such   - S/p latency antibiotics completed   - S/p BMZ -  - S/p NICU consult   - Abruption labs obtained 3/12 due to new abdominal pain, were wnl. Now abdominal pain significantly improved. No VB. No evidence of abruption at this time. Likely round ligament pain.  - Genetic testing collected and negative   - Planning to continue inpatient management until 34.0 wga unless delivery indicated " prior for fetal or maternal compromise     Fetal status  Fetal Growth Restriction  - NST reactive this AM  - continue daily NSTs while admitted   - Last growth Ultrasound: 3/10 EFW 912g 8%, AC 10% w/normal dopplers  - breech 3/10, weekly BPPs     Routine   - Vaginal irritation: wet prep collected positive for yeast; treated 2/19  - GBS negative, 2/10  - BCM: POPs   - 1 hr gtt 99      Dispo: Continue inpatient admission until delivery at 34.0 wks or sooner for fetal or maternal indications     To be seen and discussed with M Attending, Dr. Drew Chapin MD PGY-1   [facility] Pager [phone]

## 2025-03-16 VITALS
DIASTOLIC BLOOD PRESSURE: 73 MMHG | WEIGHT: 190.92 LBS | RESPIRATION RATE: 16 BRPM | TEMPERATURE: 97.5 F | HEART RATE: 82 BPM | BODY MASS INDEX: 28.94 KG/M2 | SYSTOLIC BLOOD PRESSURE: 111 MMHG | HEIGHT: 68 IN | OXYGEN SATURATION: 98 %

## 2025-03-16 LAB
APTT PPP: 24 SECONDS (ref 26–36)
BASOPHILS # BLD AUTO: 0.05 X10*3/UL (ref 0–0.1)
BASOPHILS NFR BLD AUTO: 0.4 %
EOSINOPHIL # BLD AUTO: 0.13 X10*3/UL (ref 0–0.7)
EOSINOPHIL NFR BLD AUTO: 0.9 %
ERYTHROCYTE [DISTWIDTH] IN BLOOD BY AUTOMATED COUNT: 12.4 % (ref 11.5–14.5)
FIBRINOGEN PPP-MCNC: 377 MG/DL (ref 200–400)
HCT VFR BLD AUTO: 34.3 % (ref 36–46)
HGB BLD-MCNC: 11.6 G/DL (ref 12–16)
IMM GRANULOCYTES # BLD AUTO: 0.23 X10*3/UL (ref 0–0.7)
IMM GRANULOCYTES NFR BLD AUTO: 1.6 % (ref 0–0.9)
INR PPP: 0.9 (ref 0.9–1.1)
LYMPHOCYTES # BLD AUTO: 2.22 X10*3/UL (ref 1.2–4.8)
LYMPHOCYTES NFR BLD AUTO: 15.9 %
MCH RBC QN AUTO: 31.4 PG (ref 26–34)
MCHC RBC AUTO-ENTMCNC: 33.8 G/DL (ref 32–36)
MCV RBC AUTO: 93 FL (ref 80–100)
MONOCYTES # BLD AUTO: 1.02 X10*3/UL (ref 0.1–1)
MONOCYTES NFR BLD AUTO: 7.3 %
NEUTROPHILS # BLD AUTO: 10.31 X10*3/UL (ref 1.2–7.7)
NEUTROPHILS NFR BLD AUTO: 73.9 %
NRBC BLD-RTO: 0 /100 WBCS (ref 0–0)
PLATELET # BLD AUTO: 187 X10*3/UL (ref 150–450)
PROTHROMBIN TIME: 9.9 SECONDS (ref 9.8–12.4)
RBC # BLD AUTO: 3.7 X10*6/UL (ref 4–5.2)
WBC # BLD AUTO: 14 X10*3/UL (ref 4.4–11.3)

## 2025-03-16 PROCEDURE — 85384 FIBRINOGEN ACTIVITY: CPT

## 2025-03-16 PROCEDURE — 59025 FETAL NON-STRESS TEST: CPT | Mod: GC

## 2025-03-16 PROCEDURE — 2500000004 HC RX 250 GENERAL PHARMACY W/ HCPCS (ALT 636 FOR OP/ED)

## 2025-03-16 PROCEDURE — 2500000001 HC RX 250 WO HCPCS SELF ADMINISTERED DRUGS (ALT 637 FOR MEDICARE OP)

## 2025-03-16 PROCEDURE — 85025 COMPLETE CBC W/AUTO DIFF WBC: CPT

## 2025-03-16 PROCEDURE — 85610 PROTHROMBIN TIME: CPT

## 2025-03-16 PROCEDURE — 7210000002 HC LABOR PER HOUR

## 2025-03-16 PROCEDURE — 99233 SBSQ HOSP IP/OBS HIGH 50: CPT

## 2025-03-16 PROCEDURE — 59025 FETAL NON-STRESS TEST: CPT

## 2025-03-16 PROCEDURE — 87081 CULTURE SCREEN ONLY: CPT

## 2025-03-16 PROCEDURE — 36415 COLL VENOUS BLD VENIPUNCTURE: CPT

## 2025-03-16 PROCEDURE — 1220000001 HC OB SEMI-PRIVATE ROOM DAILY

## 2025-03-16 RX ORDER — CYCLOBENZAPRINE HCL 10 MG
5 TABLET ORAL ONCE
Status: DISCONTINUED | OUTPATIENT
Start: 2025-03-16 | End: 2025-03-18 | Stop reason: ALTCHOICE

## 2025-03-16 RX ORDER — BETAMETHASONE SODIUM PHOSPHATE AND BETAMETHASONE ACETATE 3; 3 MG/ML; MG/ML
12 INJECTION, SUSPENSION INTRA-ARTICULAR; INTRALESIONAL; INTRAMUSCULAR; SOFT TISSUE EVERY 24 HOURS
Status: COMPLETED | OUTPATIENT
Start: 2025-03-16 | End: 2025-03-17

## 2025-03-16 RX ORDER — CYCLOBENZAPRINE HCL 10 MG
10 TABLET ORAL ONCE
Status: DISCONTINUED | OUTPATIENT
Start: 2025-03-16 | End: 2025-03-16

## 2025-03-16 RX ORDER — ACETAMINOPHEN 325 MG/1
325 TABLET ORAL ONCE
Status: DISCONTINUED | OUTPATIENT
Start: 2025-03-16 | End: 2025-03-18 | Stop reason: ALTCHOICE

## 2025-03-16 RX ORDER — ACETAMINOPHEN 325 MG/1
975 TABLET ORAL ONCE
Status: DISCONTINUED | OUTPATIENT
Start: 2025-03-16 | End: 2025-03-16

## 2025-03-16 RX ORDER — CALCIUM GLUCONATE 98 MG/ML
1 INJECTION, SOLUTION INTRAVENOUS ONCE AS NEEDED
Status: DISCONTINUED | OUTPATIENT
Start: 2025-03-16 | End: 2025-03-18 | Stop reason: ALTCHOICE

## 2025-03-16 RX ORDER — MAGNESIUM SULFATE HEPTAHYDRATE 40 MG/ML
2 INJECTION, SOLUTION INTRAVENOUS CONTINUOUS
Status: DISCONTINUED | OUTPATIENT
Start: 2025-03-16 | End: 2025-03-18 | Stop reason: ALTCHOICE

## 2025-03-16 RX ADMIN — BETAMETHASONE SODIUM PHOSPHATE AND BETAMETHASONE ACETATE 12 MG: 3; 3 INJECTION, SUSPENSION INTRA-ARTICULAR; INTRALESIONAL; INTRAMUSCULAR at 18:20

## 2025-03-16 RX ADMIN — PRENATAL VIT W/ FE FUMARATE-FA TAB 27-0.8 MG 1 TABLET: 27-0.8 TAB at 09:27

## 2025-03-16 RX ADMIN — CYCLOBENZAPRINE 5 MG: 10 TABLET, FILM COATED ORAL at 16:31

## 2025-03-16 RX ADMIN — ACETAMINOPHEN 650 MG: 325 TABLET, FILM COATED ORAL at 16:30

## 2025-03-16 ASSESSMENT — PAIN SCALES - GENERAL
PAINLEVEL_OUTOF10: 0 - NO PAIN

## 2025-03-16 ASSESSMENT — PAIN - FUNCTIONAL ASSESSMENT
PAIN_FUNCTIONAL_ASSESSMENT: 0-10

## 2025-03-16 NOTE — SIGNIFICANT EVENT
"Notified by RN of pt cramping     S: Pt reports that she recently started feeling cramping low in her pelvis. It has been coming and going and is not constant. Is not strong to the point where she can't talk through them but is stronger than cramping she has had before. Does not feel like round ligament pain that she has previously been having. Pain is not worse when she presses on it and hasn't noticed any vaginal bleeding. Feels consistent gushes since cramping has started.     O: /71   Pulse 90   Temp 36.6 °C (97.9 °F) (Temporal)   Resp 16   Ht 1.727 m (5' 8\")   Wt 86.6 kg (190 lb 14.7 oz)   LMP 09/16/2024   SpO2 96%   BMI 29.03 kg/m²      FHR: 130/mod/+accel/-decel  [place]: quiet     Cervical exam cl/50/-3     A/P: Patient with cramping in the s/o PPROM. Cervical exam closed. Will put on NST and evaluate and continue to watch toco. Will administer tylenol and flexeril. If pain is continuing in 2 hours will consider recheck for possible labor. Will continue to monitor closely.     Madelaine Chapin MD PGY-1   "

## 2025-03-16 NOTE — PROGRESS NOTES
"ANTEPARTUM PROGRESS NOTE   3/16/2025, 6:07 AM     SUBJECTIVE:  No acute events overnight. Patient has no complaints this morning. Denies painful contractions, VB, vaginal discharge. Some LOF that is still clear/yellow. Repots mild cramping around 11pm but resolved. Reports normal fetal movement.     OBJECTIVE:   /71   Pulse 87   Temp 36.9 °C (98.4 °F) (Temporal)   Resp 16   Ht 1.727 m (5' 8\")   Wt 86.6 kg (190 lb 14.7 oz)   LMP 2024   SpO2 97%   BMI 29.03 kg/m²    Temp  Min: 36.4 °C (97.5 °F)  Max: 37.2 °C (99 °F)  Pulse  Min: 87  Max: 89  BP  Min: 99/66  Max: 109/71    General:  AAOx3, No acute distress  Cardiovascular: Warm and well perfused  Respiratory: Normal respiratory effort  Abdominal:  Soft, gravid, non-tender, no rebound or guarding  Extremities: Warm, well perfused    NST: Baseline 135/ mod variability /pos accels/ neg decels  [place]: quiet     Labs:   Lab Results   Component Value Date    HGB 11.3 (L) 03/10/2025    WBC 14.5 (H) 03/10/2025    FIBRINOGEN 408 (H) 2025    INR 0.9 2025      ASSESSMENT AND PLAN:     24 y.o.  at 28w2d by 19.3wk US with PPROM.     PPROM  - Hx of oligo since first formal US on  and again on , JIM 4.2 cm. Amniosure positive at OSH prior to transfer. Speculum exam neg x3 on admission to [facility]. Actim Prom negative on . Given high suspicion for PRROM, planning to continue management as such   - S/p latency antibiotics completed   - S/p BMZ -  - S/p NICU consult   - Abruption labs obtained 3/12 due to new abdominal pain, were wnl. Now abdominal pain significantly improved. No VB. No evidence of abruption at this time. Likely round ligament pain.  - Genetic testing collected and negative   - Planning to continue inpatient management until 34.0 wga unless delivery indicated prior for fetal or maternal compromise     Fetal status  Fetal Growth Restriction  - NST reactive this AM  - continue daily NSTs while admitted   - Last " growth Ultrasound: 3/10 EFW 912g 8%, AC 10% w/normal dopplers  - breech 3/10, weekly BPPs     Routine   - Vaginal irritation: wet prep collected positive for yeast; treated 2/19  - GBS negative, 2/10  - BCM: POPs   - 1 hr gtt 99      Dispo: Continue inpatient admission until delivery at 34.0 wks or sooner for fetal or maternal indications     To be seen and discussed with [facility] Attending, Dr. Drew Brooks MD, PGY-1  [facility] Pager [phone]

## 2025-03-17 ENCOUNTER — APPOINTMENT (OUTPATIENT)
Dept: RADIOLOGY | Facility: HOSPITAL | Age: 25
End: 2025-03-17
Payer: COMMERCIAL

## 2025-03-17 LAB
BASOPHILS # BLD AUTO: 0.05 X10*3/UL (ref 0–0.1)
BASOPHILS NFR BLD AUTO: 0.2 %
EOSINOPHIL # BLD AUTO: 0 X10*3/UL (ref 0–0.7)
EOSINOPHIL NFR BLD AUTO: 0 %
ERYTHROCYTE [DISTWIDTH] IN BLOOD BY AUTOMATED COUNT: 12.1 % (ref 11.5–14.5)
HCT VFR BLD AUTO: 34.9 % (ref 36–46)
HGB BLD-MCNC: 11.7 G/DL (ref 12–16)
IMM GRANULOCYTES # BLD AUTO: 0.37 X10*3/UL (ref 0–0.7)
IMM GRANULOCYTES NFR BLD AUTO: 1.8 % (ref 0–0.9)
LYMPHOCYTES # BLD AUTO: 0.91 X10*3/UL (ref 1.2–4.8)
LYMPHOCYTES NFR BLD AUTO: 4.4 %
MCH RBC QN AUTO: 31.4 PG (ref 26–34)
MCHC RBC AUTO-ENTMCNC: 33.5 G/DL (ref 32–36)
MCV RBC AUTO: 94 FL (ref 80–100)
MONOCYTES # BLD AUTO: 0.46 X10*3/UL (ref 0.1–1)
MONOCYTES NFR BLD AUTO: 2.2 %
NEUTROPHILS # BLD AUTO: 18.89 X10*3/UL (ref 1.2–7.7)
NEUTROPHILS NFR BLD AUTO: 91.4 %
NRBC BLD-RTO: 0 /100 WBCS (ref 0–0)
PLATELET # BLD AUTO: 219 X10*3/UL (ref 150–450)
RBC # BLD AUTO: 3.73 X10*6/UL (ref 4–5.2)
WBC # BLD AUTO: 20.7 X10*3/UL (ref 4.4–11.3)

## 2025-03-17 PROCEDURE — 76820 UMBILICAL ARTERY ECHO: CPT

## 2025-03-17 PROCEDURE — 59025 FETAL NON-STRESS TEST: CPT | Mod: GC

## 2025-03-17 PROCEDURE — 2500000001 HC RX 250 WO HCPCS SELF ADMINISTERED DRUGS (ALT 637 FOR MEDICARE OP)

## 2025-03-17 PROCEDURE — 36415 COLL VENOUS BLD VENIPUNCTURE: CPT

## 2025-03-17 PROCEDURE — 59025 FETAL NON-STRESS TEST: CPT

## 2025-03-17 PROCEDURE — 76815 OB US LIMITED FETUS(S): CPT

## 2025-03-17 PROCEDURE — 99232 SBSQ HOSP IP/OBS MODERATE 35: CPT

## 2025-03-17 PROCEDURE — 85025 COMPLETE CBC W/AUTO DIFF WBC: CPT

## 2025-03-17 PROCEDURE — 2500000004 HC RX 250 GENERAL PHARMACY W/ HCPCS (ALT 636 FOR OP/ED)

## 2025-03-17 PROCEDURE — 1220000001 HC OB SEMI-PRIVATE ROOM DAILY

## 2025-03-17 RX ADMIN — ACETAMINOPHEN 650 MG: 325 TABLET, FILM COATED ORAL at 08:57

## 2025-03-17 RX ADMIN — PRENATAL VIT W/ FE FUMARATE-FA TAB 27-0.8 MG 1 TABLET: 27-0.8 TAB at 08:57

## 2025-03-17 RX ADMIN — BETAMETHASONE SODIUM PHOSPHATE AND BETAMETHASONE ACETATE 12 MG: 3; 3 INJECTION, SUSPENSION INTRA-ARTICULAR; INTRALESIONAL; INTRAMUSCULAR at 18:48

## 2025-03-17 ASSESSMENT — PAIN SCALES - GENERAL
PAINLEVEL_OUTOF10: 0 - NO PAIN
PAINLEVEL_OUTOF10: 2
PAINLEVEL_OUTOF10: 0 - NO PAIN

## 2025-03-17 ASSESSMENT — PAIN DESCRIPTION - DESCRIPTORS: DESCRIPTORS: TENDER

## 2025-03-17 ASSESSMENT — PAIN - FUNCTIONAL ASSESSMENT
PAIN_FUNCTIONAL_ASSESSMENT: 0-10
PAIN_FUNCTIONAL_ASSESSMENT: 0-10

## 2025-03-17 NOTE — SIGNIFICANT EVENT
"Transfer to [facility]     Subjective   Pt reports improving abdominal cramping and feelings of contractions after Tylenol and Flexeril. Prior to transfer to L&D she had increased LOF, which has decreased since prolonged monitoring, denies VB, and feels good fetal movement. Denies fever, chills.     Objective    ./61   Pulse 77   Temp 36.9 °C (98.4 °F)   Resp 16   Ht 1.727 m (5' 8\")   Wt 86.6 kg (190 lb 14.7 oz)   LMP 2024   SpO2 98%   BMI 29.03 kg/m²     General: no acute distress   HEENT: normocephalic, atraumatic   Cards: RRR   Pulm: breathing well on room air   Abdomen: soft, gravid, mild abdomen soreness to palpation (consistent with prior exams), no palpable contractions  Last cervical exam by Dr. Chapin cl/50%/-3   Extremities: no lesions, skin discoloration or calf tenderness   Neuro: no focal deficits     FHT: 130, moderate variability, +accel, -decel   [place]: uterine irritability     A/P    PPROM  - Hx of oligo since first formal US on  and again on , JIM 4.2 cm. Amniosure positive at OSH prior to transfer. Speculum exam neg x3 on admission to [facility]. Actim Prom negative on . Given high suspicion for PRROM, planning to continue management as such , most recently large gush of fluid   - S/p latency antibiotics completed   - S/p BMZ -  - S/p NICU consult   - Abruption labs obtained 3/12 due to new abdominal pain, were wnl. Transferred to L&D for painful ctx, repeat abruption labs notable for fibrinogen 408 > 377, overall wnl. No VB, lower suspicion for abruption at this time given improving symptoms after Tylenol and Flexeril given.   - Genetic testing collected and negative   - Planning to continue inpatient management until 34.0 wga unless delivery indicated prior for fetal or maternal compromise    Fetal Status  FGR  -Cat I tracing while on L&D   - Last growth Ultrasound: 3/10 EFW 912g 8%, AC 10% w/normal dopplers  - breech 3/10  - GBS neg (), recollected 3/16 "     Dispo: patient stable for transfer to [facility] for inpatient expectant management     Seen and d/w Dr. Cazares,   Concepción Browne MD, PGY-3

## 2025-03-17 NOTE — PROGRESS NOTES
"ANTEPARTUM PROGRESS NOTE   3/17/2025, 7:15 AM     SUBJECTIVE:  No acute events overnight. Denies painful contractions, VB, vaginal discharge. Some LOF that is still clear/yellow. Repots some sharp abdominal pains overnight, but resolved quickly. Some LLQ pain that started right before her NST, worse with movement. Reports normal fetal movement.     OBJECTIVE:   /73   Pulse 82   Temp 36.4 °C (97.5 °F) (Temporal)   Resp 16   Ht 1.727 m (5' 8\")   Wt 86.6 kg (190 lb 14.7 oz)   LMP 2024   SpO2 98%   BMI 29.03 kg/m²    Temp  Min: 36.4 °C (97.5 °F)  Max: 36.9 °C (98.4 °F)  Pulse  Min: 77  Max: 93  BP  Min: 95/56  Max: 123/68    General:  AAOx3, No acute distress  Cardiovascular: Warm and well perfused  Respiratory: Normal respiratory effort  Abdominal:  Soft, gravid, mildly tender in LLQ, no rebound or guarding  Extremities: Warm, well perfused    NST: Baseline 135/ mod variability /pos accels/ neg decels  [place]: quiet     Labs:   Lab Results   Component Value Date    HGB 11.6 (L) 2025    WBC 14.0 (H) 2025    FIBRINOGEN 377 2025    INR 0.9 2025      ASSESSMENT AND PLAN:     24 y.o.  at 28w3d by 19.3wk US with PPROM.     PPROM  - Hx of oligo since first formal US on  and again on , JIM 4.2 cm. Amniosure positive at OSH prior to transfer. Speculum exam neg x3 on admission to [facility]. Actim Prom negative on . Given high suspicion for PRROM, planning to continue management as such   - S/p latency antibiotics completed   - S/p BMZ -  - S/p NICU consult   - Abruption labs obtained 3/12 due to new abdominal pain, were wnl. Intermittent abdominal pain. No VB. No evidence of abruption at this time. Likely round ligament pain. CBC pending this AM. Will trial Tylenol  - Genetic testing collected and negative   - Planning to continue inpatient management until 34.0 wga unless delivery indicated prior for fetal or maternal compromise     Fetal status  Fetal Growth " Restriction  - NST reactive this AM  - continue daily NSTs while admitted   - Last growth Ultrasound: 3/10 EFW 912g 8%, AC 10% w/normal dopplers  - breech 3/10, weekly BPPs     Routine   - Vaginal irritation: wet prep collected positive for yeast; treated 2/19  - GBS negative, 2/10. Recollected 3/16  - BCM: POPs   - 1 hr gtt 99      Dispo: Continue inpatient admission until delivery at 34.0 wks or sooner for fetal or maternal indications     To be seen and discussed with M Attending, Dr. Elbert Quijano MD PGY2  [facility] Pager [phone]

## 2025-03-18 LAB
ABO GROUP (TYPE) IN BLOOD: NORMAL
ANTIBODY SCREEN: NORMAL
ERYTHROCYTE [DISTWIDTH] IN BLOOD BY AUTOMATED COUNT: 12.5 % (ref 11.5–14.5)
HCT VFR BLD AUTO: 33.2 % (ref 36–46)
HGB BLD-MCNC: 11.1 G/DL (ref 12–16)
MCH RBC QN AUTO: 31.5 PG (ref 26–34)
MCHC RBC AUTO-ENTMCNC: 33.4 G/DL (ref 32–36)
MCV RBC AUTO: 94 FL (ref 80–100)
NRBC BLD-RTO: 0 /100 WBCS (ref 0–0)
PLATELET # BLD AUTO: 216 X10*3/UL (ref 150–450)
RBC # BLD AUTO: 3.52 X10*6/UL (ref 4–5.2)
RH FACTOR (ANTIGEN D): NORMAL
WBC # BLD AUTO: 22 X10*3/UL (ref 4.4–11.3)

## 2025-03-18 PROCEDURE — 1220000001 HC OB SEMI-PRIVATE ROOM DAILY

## 2025-03-18 PROCEDURE — 2500000001 HC RX 250 WO HCPCS SELF ADMINISTERED DRUGS (ALT 637 FOR MEDICARE OP)

## 2025-03-18 PROCEDURE — 86901 BLOOD TYPING SEROLOGIC RH(D): CPT

## 2025-03-18 PROCEDURE — 36415 COLL VENOUS BLD VENIPUNCTURE: CPT

## 2025-03-18 PROCEDURE — 99233 SBSQ HOSP IP/OBS HIGH 50: CPT

## 2025-03-18 PROCEDURE — 59025 FETAL NON-STRESS TEST: CPT

## 2025-03-18 PROCEDURE — 85027 COMPLETE CBC AUTOMATED: CPT

## 2025-03-18 PROCEDURE — 59025 FETAL NON-STRESS TEST: CPT | Mod: GC

## 2025-03-18 RX ADMIN — CALCIUM CARBONATE (ANTACID) CHEW TAB 500 MG 500 MG: 500 CHEW TAB at 20:10

## 2025-03-18 RX ADMIN — PRENATAL VIT W/ FE FUMARATE-FA TAB 27-0.8 MG 1 TABLET: 27-0.8 TAB at 08:07

## 2025-03-18 ASSESSMENT — PAIN - FUNCTIONAL ASSESSMENT
PAIN_FUNCTIONAL_ASSESSMENT: 0-10

## 2025-03-18 ASSESSMENT — PAIN SCALES - GENERAL
PAINLEVEL_OUTOF10: 0 - NO PAIN

## 2025-03-18 NOTE — PROGRESS NOTES
"ANTEPARTUM PROGRESS NOTE   3/18/2025, 7:18 AM     SUBJECTIVE: No acute events overnight. Patient has no complaints this morning. Denies painful contractions, VB, vaginal discharge, changes in her LOF. Reports normal fetal movement. Having some crampy pain in her abdomen which is intermittently on each side which patient describes as round ligament pain.     OBJECTIVE:   /68 (BP Location: Left arm, Patient Position: Sitting)   Pulse 91   Temp 36.8 °C (98.2 °F) (Temporal)   Resp 18   Ht 1.727 m (5' 8\")   Wt 86.6 kg (190 lb 14.7 oz)   LMP 2024   SpO2 97%   BMI 29.03 kg/m²    Temp  Min: 36.2 °C (97.2 °F)  Max: 37.4 °C (99.3 °F)  Pulse  Min: 81  Max: 95  BP  Min: 104/68  Max: 123/74    General:  AAOx3, No acute distress  Cardiovascular: Warm and well perfused  Respiratory: Normal respiratory effort   Abdominal:  Soft, gravid, non-tender, no rebound or guarding  Extremities: Warm, well perfused    NST: Baseline 130/ mod variability /pos accels/ no decels  [place]: no ctx     Labs:   Lab Results   Component Value Date    WBC 22.0 (H) 2025    HGB 11.1 (L) 2025    HCT 33.2 (L) 2025     2025       ASSESSMENT AND PLAN:     24 y.o.  at 28w4d by 19.3wk US with PPROM.     PPROM  - Hx of oligo since first formal US on  and again on , JIM 4.2 cm. Amniosure positive at OSH prior to transfer. Speculum exam neg x3 on admission to [facility]. Actim Prom negative on . Given high suspicion for PRROM, planning to continue management as such   - S/p latency antibiotics completed   - S/p BMZ -  - S/p NICU consult   - Abruption labs obtained 3/12 due to new abdominal pain, were wnl. Intermittent abdominal pain. No VB. No evidence of abruption at this time. Likely round ligament pain. CBC 3/18 with WBC 22 however in s/o rBMZ. Will cont to monitor closely.  - S/p genetic counseling due to early oligohydramnios - MaterniT Genome screening performed and negative for tested " conditions  - Planning to continue inpatient management until 34.0 wga unless delivery indicated prior for fetal or maternal compromise     Fetal status  Fetal Growth Restriction  - NST reactive this AM  - continue daily NSTs while admitted   - Last growth Ultrasound: 3/10 EFW 912g 8%, AC 10% w/normal dopplers  - breech 3/10, weekly BPPs     Routine   - Vaginal irritation: wet prep collected positive for yeast; treated   - GBS negative, 2/10. Recollected 3/16  - BCM: POPs   - 1 hr gtt 99      Dispo: Continue inpatient admission until delivery at 34.0 wks or sooner for fetal or maternal indications     To be seen and discussed with [facility] Attending, Dr. Russell.     Shelby Gallardo MD PGY3  [facility] Pager [phone]     Principal Problem:     premature rupture of membranes (PPROM) with unknown onset of labor ([facility])  Active Problems:    Oligohydramnios antepartum, second trimester, not applicable or unspecified fetus ([facility])    25 weeks gestation of pregnancy ([facility])

## 2025-03-18 NOTE — CARE PLAN
The patient's goals for the shift include maintain pregnancy    The clinical goals for the shift include no s/sx of infection, FHR remains reassuring    Patient remained free from falls/injury throughout shift. VSS and no s/sx of infection at this time. Patient denies n/v, fever, or chills. FHR remained reassuring with spot check throughout shift. No changes in amniotic fluid throughout shift, and patient declines abd tenderness, ctx or cramping, and vaginal bleeding. Patient resting comfortably in bed and denies needs at this time.

## 2025-03-19 LAB
ERYTHROCYTE [DISTWIDTH] IN BLOOD BY AUTOMATED COUNT: 12.5 % (ref 11.5–14.5)
GP B STREP GENITAL QL CULT: ABNORMAL
HCT VFR BLD AUTO: 32.7 % (ref 36–46)
HGB BLD-MCNC: 11.1 G/DL (ref 12–16)
MCH RBC QN AUTO: 31.4 PG (ref 26–34)
MCHC RBC AUTO-ENTMCNC: 33.9 G/DL (ref 32–36)
MCV RBC AUTO: 92 FL (ref 80–100)
NRBC BLD-RTO: 0 /100 WBCS (ref 0–0)
PLATELET # BLD AUTO: 216 X10*3/UL (ref 150–450)
RBC # BLD AUTO: 3.54 X10*6/UL (ref 4–5.2)
WBC # BLD AUTO: 17.9 X10*3/UL (ref 4.4–11.3)

## 2025-03-19 PROCEDURE — 85027 COMPLETE CBC AUTOMATED: CPT

## 2025-03-19 PROCEDURE — 59025 FETAL NON-STRESS TEST: CPT

## 2025-03-19 PROCEDURE — 2500000001 HC RX 250 WO HCPCS SELF ADMINISTERED DRUGS (ALT 637 FOR MEDICARE OP)

## 2025-03-19 PROCEDURE — 36415 COLL VENOUS BLD VENIPUNCTURE: CPT

## 2025-03-19 PROCEDURE — 99232 SBSQ HOSP IP/OBS MODERATE 35: CPT

## 2025-03-19 PROCEDURE — 1220000001 HC OB SEMI-PRIVATE ROOM DAILY

## 2025-03-19 PROCEDURE — 59025 FETAL NON-STRESS TEST: CPT | Mod: GC

## 2025-03-19 RX ORDER — FAMOTIDINE 20 MG/1
20 TABLET, FILM COATED ORAL ONCE
Status: COMPLETED | OUTPATIENT
Start: 2025-03-19 | End: 2025-03-19

## 2025-03-19 RX ADMIN — CALCIUM CARBONATE (ANTACID) CHEW TAB 500 MG 500 MG: 500 CHEW TAB at 20:50

## 2025-03-19 RX ADMIN — PRENATAL VIT W/ FE FUMARATE-FA TAB 27-0.8 MG 1 TABLET: 27-0.8 TAB at 08:43

## 2025-03-19 RX ADMIN — CALCIUM CARBONATE (ANTACID) CHEW TAB 500 MG 500 MG: 500 CHEW TAB at 01:14

## 2025-03-19 RX ADMIN — FAMOTIDINE 20 MG: 20 TABLET, FILM COATED ORAL at 01:13

## 2025-03-19 ASSESSMENT — PAIN SCALES - GENERAL
PAINLEVEL_OUTOF10: 0 - NO PAIN

## 2025-03-19 ASSESSMENT — PAIN - FUNCTIONAL ASSESSMENT
PAIN_FUNCTIONAL_ASSESSMENT: 0-10

## 2025-03-19 NOTE — PROGRESS NOTES
"ANTEPARTUM PROGRESS NOTE   3/19/2025, 6:27 AM     SUBJECTIVE:  No acute events overnight. Denies painful contractions, VB, vaginal discharge, changes in her LOF. Reports normal fetal movement. Had one episode of pain on her right side in the distribution of round ligament pain but otherwise no abdominal pain.    OBJECTIVE:   /60   Pulse 92   Temp 37.8 °C (100 °F) (Temporal)   Resp 16   Ht 1.727 m (5' 8\")   Wt 86.6 kg (190 lb 14.7 oz)   LMP 2024   SpO2 96%   BMI 29.03 kg/m²    Temp  Min: 36.6 °C (97.9 °F)  Max: 37.8 °C (100 °F)  Pulse  Min: 77  Max: 92  BP  Min: 98/60  Max: 121/68    General:  AAOx3, No acute distress  Cardiovascular: Warm and well perfused  Respiratory: Normal respiratory effort   Abdominal:  Soft, gravid, non-tender, no rebound or guarding  Extremities: Warm, well perfused    NST: Baseline 135/ mod variability /pos accels/ neg decels  [place]: acontractile     Labs:   Lab Results   Component Value Date    WBC 22.0 (H) 2025    HGB 11.1 (L) 2025    HCT 33.2 (L) 2025     2025     No results found for: \"GLUCOSE\", \"NA\", \"K\", \"CL\", \"CO2\", \"ANIONGAP\", \"BUN\", \"CREATININE\", \"EGFR\", \"CALCIUM\", \"ALBUMIN\", \"PROT\", \"ALKPHOS\", \"ALT\", \"AST\", \"BILITOT\"    ASSESSMENT AND PLAN:     24 y.o.  at 28w5d by 19.3wk US with PPROM.     PPROM  - Hx of oligo since first formal US on  and again on , JIM 4.2 cm. Amniosure positive at OSH prior to transfer. Speculum exam neg x3 on admission to [facility]. Actim Prom negative on . Given high suspicion for PRROM, planning to continue management as such   - S/p latency antibiotics completed   - S/p BMZ -  - S/p NICU consult   - Abruption labs obtained 3/12 due to new abdominal pain, were wnl. Intermittent abdominal pain. No VB. No evidence of abruption at this time. Likely round ligament pain. CBC 3/18 with WBC 22 however in s/o rBMZ. Will cont to monitor closely, repeat CBC pending.  - S/p genetic counseling due " to early oligohydramnios - MaterniT Genome screening performed and negative for tested conditions  - Planning to continue inpatient management until 34.0 wga unless delivery indicated prior for fetal or maternal compromise     Fetal status  Fetal Growth Restriction  - NST reactive this AM  - continue daily NSTs while admitted   - Last growth Ultrasound: 3/10 EFW 912g 8%, AC 10% w/normal dopplers  - breech 3/10, weekly BPPs     Routine   - Vaginal irritation: wet prep collected positive for yeast; treated 2/19  - GBS negative, 2/10. Recollected 3/16  - BCM: POPs   - 1 hr gtt 99      Dispo: Continue inpatient admission until delivery at 34.0 wks or sooner for fetal or maternal indications     To be seen and discussed with M Attending, Dr. Boswell.   Shelby Gallardo MD PGY3  [facility] Pager [phone]

## 2025-03-19 NOTE — PROGRESS NOTES
"ANTEPARTUM PROGRESS NOTE   2025, 6:40 AM     SUBJECTIVE: No acute events overnight. Reports she is now having leakage of fluid that is mostly clear with a yellow tone. Denies fevers/chills, painful contractions, VB. Reports normal fetal movement. Patient is having worsening round ligament discomfort when she lays on her side. Has not yet tried tylenol, heat packs, or stretches.      OBJECTIVE:   BP 99/64 (BP Location: Left arm, Patient Position: Lying)   Pulse 73   Temp 36.8 °C (98.2 °F) (Temporal)   Resp 18   Ht 1.727 m (5' 8\")   Wt 80.9 kg (178 lb 5.6 oz)   LMP 2024   SpO2 98%   BMI 27.12 kg/m²    Temp  Min: 36 °C (96.8 °F)  Max: 36.8 °C (98.2 °F)  Pulse  Min: 73  Max: 97  BP  Min: 99/64  Max: 121/79    General: AAOx3, No acute distress  Cardiovascular: Warm and well perfused, RRR  Respiratory: Normal respiratory effort on RA  Abdominal: Soft, gravid, non-tender, no rebound or guarding, no palpable contractions, round ligament tenderness elicited on mobilization of uterus to bilateral sides      NST: baseline 125/mod/+accel/-decel  [place]: no contractions     ASSESSMENT AND PLAN:   Tatianna Mojica is a 24 y.o.  at 25w6d by 19.3wk US with suspected PPROM.     Oligohydramnios, presumed PPROM  - Hx of oligo since first formal US on  and again on , JIM 4.2 cm. Amniosure positive at OSH prior to transfer. Speculum exam neg x3 on admission to [facility]. Actim Prom negative on . Given high suspicion for PRROM, planning to continue management as such   - Pt now leaking fluid that is mostly clear with yellow   - S/p latency antibiotics completed   - S/p BMZ -  - S/p NICU consult   - Genetic testing collected and negative   - Pelvic discomfort consistent with round ligament pain on physical exam, discussed tylenol, heat packs, and stretches for relief   - Planning to continue inpatient management until 34.0 wga unless delivery indicated prior for fetal or maternal compromise   " Noted. Refill done.      Fetal status  - NST AGA this morning, continue daily NSTs while admitted   - Last Ultrasound:  EFW 557g, 10%, AC 18%, Breech     Routine   - Vaginal irritation: wet prep collected positive for yeast; treated   - GBS negative, 2/10  - BCM: POPs   - 1 hr gtt 99      Dispo: Continue inpatient admission until delivery at 34.0 wks or sooner for fetal or maternal indications     Pt to be seen and discussed with MFM Attending, Dr. Clarke.   Madelaine Chapin MD PGY-1   [facility], Pager [phone]     Principal Problem:     premature rupture of membranes (PPROM) with unknown onset of labor ([facility])  Active Problems:    Oligohydramnios antepartum, second trimester, not applicable or unspecified fetus ([facility])    25 weeks gestation of pregnancy ([facility])

## 2025-03-20 PROCEDURE — 2500000001 HC RX 250 WO HCPCS SELF ADMINISTERED DRUGS (ALT 637 FOR MEDICARE OP)

## 2025-03-20 PROCEDURE — 59025 FETAL NON-STRESS TEST: CPT | Mod: GC

## 2025-03-20 PROCEDURE — 99232 SBSQ HOSP IP/OBS MODERATE 35: CPT

## 2025-03-20 PROCEDURE — 1220000001 HC OB SEMI-PRIVATE ROOM DAILY

## 2025-03-20 PROCEDURE — 59025 FETAL NON-STRESS TEST: CPT

## 2025-03-20 PROCEDURE — 2500000001 HC RX 250 WO HCPCS SELF ADMINISTERED DRUGS (ALT 637 FOR MEDICARE OP): Performed by: STUDENT IN AN ORGANIZED HEALTH CARE EDUCATION/TRAINING PROGRAM

## 2025-03-20 RX ORDER — FAMOTIDINE 20 MG/1
20 TABLET, FILM COATED ORAL 2 TIMES DAILY
Status: DISCONTINUED | OUTPATIENT
Start: 2025-03-20 | End: 2025-04-26 | Stop reason: HOSPADM

## 2025-03-20 RX ADMIN — FAMOTIDINE 20 MG: 20 TABLET ORAL at 20:11

## 2025-03-20 RX ADMIN — FAMOTIDINE 20 MG: 20 TABLET ORAL at 00:39

## 2025-03-20 RX ADMIN — FAMOTIDINE 20 MG: 20 TABLET ORAL at 08:38

## 2025-03-20 RX ADMIN — CALCIUM CARBONATE (ANTACID) CHEW TAB 500 MG 500 MG: 500 CHEW TAB at 00:39

## 2025-03-20 RX ADMIN — PRENATAL VIT W/ FE FUMARATE-FA TAB 27-0.8 MG 1 TABLET: 27-0.8 TAB at 08:38

## 2025-03-20 ASSESSMENT — PAIN SCALES - GENERAL
PAINLEVEL_OUTOF10: 0 - NO PAIN

## 2025-03-20 ASSESSMENT — PAIN - FUNCTIONAL ASSESSMENT
PAIN_FUNCTIONAL_ASSESSMENT: 0-10

## 2025-03-20 NOTE — PROGRESS NOTES
"ANTEPARTUM PROGRESS NOTE   3/20/2025, 6:57 AM     SUBJECTIVE: No acute events overnight. Denies painful contractions, VB, vaginal discharge, changes in her LOF. Reports normal fetal movement. Denies abdominal pain.    OBJECTIVE:   /71   Pulse 91   Temp 37.3 °C (99.1 °F) (Temporal)   Resp 18   Ht 1.727 m (5' 8\")   Wt 86.6 kg (190 lb 14.7 oz)   LMP 2024   SpO2 97%   BMI 29.03 kg/m²    Temp  Min: 36.4 °C (97.5 °F)  Max: 37.3 °C (99.1 °F)  Pulse  Min: 73  Max: 91  BP  Min: 113/68  Max: 124/71    General:  AAOx3, No acute distress  Cardiovascular: Warm and well perfused  Respiratory: Normal respiratory effort   Abdominal:  Soft, gravid, non-tender, no rebound or guardinh   Extremities: Warm, well perfused    NST: Baseline 135/ mod variability /pos accels/ neg decels  [place]: no ctx     Labs:   Lab Results   Component Value Date    WBC 17.9 (H) 2025    HGB 11.1 (L) 2025    HCT 32.7 (L) 2025     2025     No results found for: \"GLUCOSE\", \"NA\", \"K\", \"CL\", \"CO2\", \"ANIONGAP\", \"BUN\", \"CREATININE\", \"EGFR\", \"CALCIUM\", \"ALBUMIN\", \"PROT\", \"ALKPHOS\", \"ALT\", \"AST\", \"BILITOT\"    ASSESSMENT AND PLAN:     24 y.o.  at 28w6d by 19.3wk US with PPROM.     PPROM  - Hx of oligo since first formal US on  and again on , JIM 4.2 cm. Amniosure positive at OSH prior to transfer. Speculum exam neg x3 on admission to [facility]. Actim Prom negative on . Given high suspicion for PRROM, planning to continue management as such   - S/p latency antibiotics completed   - S/p BMZ -  - S/p NICU consult   - Abruption labs obtained 3/12 due to new abdominal pain, were wnl. Intermittent abdominal pain. No VB. No evidence of abruption at this time. Likely round ligament pain. CBC 3/18 with WBC 22 however in s/o rBMZ. CBC then downtrended.  - S/p genetic counseling due to early oligohydramnios - MaterniT Genome screening performed and negative for tested conditions  - Planning to continue " inpatient management until 34.0 wga unless delivery indicated prior for fetal or maternal compromise     Fetal status  Fetal Growth Restriction  - NST reactive this AM  - continue daily NSTs while admitted   - Last growth Ultrasound: 3/10 EFW 912g 8%, AC 10% w/normal dopplers  - breech 3/10, weekly BPPs     Routine   - Vaginal irritation: wet prep collected positive for yeast; treated 2/19  - GBS negative, 2/10. Recollected 3/16  - BCM: POPs   - 1 hr gtt 99      Dispo: Continue inpatient admission until delivery at 34.0 wks or sooner for fetal or maternal indications     To be seen and discussed with M Attending, Dr. Boswell.   Shelby Gallardo MD PGY3  [facility] Pager [phone]

## 2025-03-21 LAB
ABO GROUP (TYPE) IN BLOOD: NORMAL
ANTIBODY SCREEN: NORMAL
RH FACTOR (ANTIGEN D): NORMAL

## 2025-03-21 PROCEDURE — 36415 COLL VENOUS BLD VENIPUNCTURE: CPT

## 2025-03-21 PROCEDURE — 2500000001 HC RX 250 WO HCPCS SELF ADMINISTERED DRUGS (ALT 637 FOR MEDICARE OP)

## 2025-03-21 PROCEDURE — 2500000001 HC RX 250 WO HCPCS SELF ADMINISTERED DRUGS (ALT 637 FOR MEDICARE OP): Performed by: STUDENT IN AN ORGANIZED HEALTH CARE EDUCATION/TRAINING PROGRAM

## 2025-03-21 PROCEDURE — 59025 FETAL NON-STRESS TEST: CPT

## 2025-03-21 PROCEDURE — 1220000001 HC OB SEMI-PRIVATE ROOM DAILY

## 2025-03-21 PROCEDURE — 86901 BLOOD TYPING SEROLOGIC RH(D): CPT

## 2025-03-21 PROCEDURE — 99232 SBSQ HOSP IP/OBS MODERATE 35: CPT

## 2025-03-21 PROCEDURE — 59025 FETAL NON-STRESS TEST: CPT | Mod: 25,GC

## 2025-03-21 RX ADMIN — FAMOTIDINE 20 MG: 20 TABLET ORAL at 09:14

## 2025-03-21 RX ADMIN — FAMOTIDINE 20 MG: 20 TABLET ORAL at 20:22

## 2025-03-21 RX ADMIN — PRENATAL VIT W/ FE FUMARATE-FA TAB 27-0.8 MG 1 TABLET: 27-0.8 TAB at 09:14

## 2025-03-21 ASSESSMENT — PAIN - FUNCTIONAL ASSESSMENT
PAIN_FUNCTIONAL_ASSESSMENT: 0-10

## 2025-03-21 ASSESSMENT — PAIN DESCRIPTION - DESCRIPTORS
DESCRIPTORS: BURNING
DESCRIPTORS: SORE;BURNING

## 2025-03-21 ASSESSMENT — PAIN SCALES - GENERAL
PAINLEVEL_OUTOF10: 0 - NO PAIN
PAINLEVEL_OUTOF10: 2
PAINLEVEL_OUTOF10: 2

## 2025-03-21 NOTE — PROGRESS NOTES
"ANTEPARTUM PROGRESS NOTE   3/21/2025, 7:12 AM     SUBJECTIVE: No acute events overnight. Denies painful contractions, VB, vaginal discharge, changes in her LOF. Reports normal fetal movement. Having some crampy abdominal pain which is similar to the last few days but a little worse this morning.    OBJECTIVE:   /64 (BP Location: Left arm, Patient Position: Sitting)   Pulse 97   Temp 37.4 °C (99.3 °F) (Temporal)   Resp 18   Ht 1.727 m (5' 8\")   Wt 86.6 kg (190 lb 14.7 oz)   LMP 2024   SpO2 97%   BMI 29.03 kg/m²    Temp  Min: 36.4 °C (97.5 °F)  Max: 37.4 °C (99.3 °F)  Pulse  Min: 77  Max: 97  BP  Min: 105/70  Max: 113/71    General:  AAOx3, No acute distress  Cardiovascular: Warm and well perfused  Respiratory: Normal respiratory effort   Abdominal:  Soft, gravid, non-tender, no rebound or guarding  Extremities: Warm, well perfused    NST: Baseline 135/ mod variability /pos accels/ neg decels  [place]: acontractile     Labs:   Lab Results   Component Value Date    WBC 17.9 (H) 2025    HGB 11.1 (L) 2025    HCT 32.7 (L) 2025     2025       ASSESSMENT AND PLAN:     24 y.o.  at 29w0d by 19.3wk US with PPROM.     PPROM  - Hx of oligo since first formal US on  and again on , JIM 4.2 cm. Amniosure positive at OSH prior to transfer. Speculum exam neg x3 on admission to [facility]. Actim Prom negative on . Given high suspicion for PRROM, planning to continue management as such   - S/p latency antibiotics completed   - S/p BMZ -  - S/p NICU consult   - Abruption labs obtained 3/12 due to new abdominal pain, were wnl. Intermittent abdominal pain. No VB. No evidence of abruption at this time. Likely round ligament pain. CBC 3/18 with WBC 22 however in s/o rBMZ. CBC then downtrended.  - S/p genetic counseling due to early oligohydramnios - MaterniT Genome screening performed and negative for tested conditions  - Planning to continue inpatient management until " 34.0 wga unless delivery indicated prior for fetal or maternal compromise     Fetal status  Fetal Growth Restriction  - NST reactive this AM  - continue daily NSTs while admitted   - Last growth Ultrasound: 3/10 EFW 912g 8%, AC 10% w/normal dopplers  - breech 3/10, weekly BPPs     Routine   - Vaginal irritation: wet prep collected positive for yeast; treated 2/19  - GBS negative, 2/10. Recollected 3/16, pos.  - BCM: POPs   - 1 hr gtt 99      Dispo: Continue inpatient admission until delivery at 34.0 wks or sooner for fetal or maternal indications    Pt seen and discussed with M Attending, Dr. Montgomery.     Shelby Gallardo MD PGY3  [facility] Pager [phone]

## 2025-03-21 NOTE — CARE PLAN
VSS t/o shift. No s/sx infection. No ctx, cramping, or bleeding. LOF remained the same: small amount, clear/yellow, nonfoul. FHR WDL during spot checks.        Problem: Antepartum  Goal: Avoid/minimize constipation  Outcome: Progressing  Goal: No decrease in circulation/VTE  Outcome: Progressing  Goal: Minimize anxiety/maximize coping  Outcome: Progressing     Problem:  Labor/Prolonged Premature Rupture of Membranes  Goal: No s/sx of IAI  Outcome: Progressing

## 2025-03-22 PROCEDURE — 2500000004 HC RX 250 GENERAL PHARMACY W/ HCPCS (ALT 636 FOR OP/ED)

## 2025-03-22 PROCEDURE — 99232 SBSQ HOSP IP/OBS MODERATE 35: CPT

## 2025-03-22 PROCEDURE — 59025 FETAL NON-STRESS TEST: CPT | Mod: GC,25

## 2025-03-22 PROCEDURE — 2500000001 HC RX 250 WO HCPCS SELF ADMINISTERED DRUGS (ALT 637 FOR MEDICARE OP)

## 2025-03-22 PROCEDURE — 59025 FETAL NON-STRESS TEST: CPT

## 2025-03-22 PROCEDURE — 2500000001 HC RX 250 WO HCPCS SELF ADMINISTERED DRUGS (ALT 637 FOR MEDICARE OP): Performed by: STUDENT IN AN ORGANIZED HEALTH CARE EDUCATION/TRAINING PROGRAM

## 2025-03-22 PROCEDURE — 1220000001 HC OB SEMI-PRIVATE ROOM DAILY

## 2025-03-22 RX ADMIN — FAMOTIDINE 20 MG: 20 TABLET ORAL at 20:16

## 2025-03-22 RX ADMIN — FAMOTIDINE 20 MG: 20 TABLET ORAL at 08:23

## 2025-03-22 RX ADMIN — PRENATAL VIT W/ FE FUMARATE-FA TAB 27-0.8 MG 1 TABLET: 27-0.8 TAB at 08:23

## 2025-03-22 RX ADMIN — POLYETHYLENE GLYCOL 3350 17 G: 17 POWDER, FOR SOLUTION ORAL at 00:55

## 2025-03-22 ASSESSMENT — PAIN SCALES - GENERAL
PAINLEVEL_OUTOF10: 0 - NO PAIN

## 2025-03-22 NOTE — PROGRESS NOTES
"ANTEPARTUM PROGRESS NOTE   3/22/2025, 7:16 AM     SUBJECTIVE:  No acute events overnight. Patient has no complaints this morning. Reports continued leaking sensation, reports intermittent gushes that are more prominent than prior. Denies painful contractions, VB, LOF. Reports normal fetal movement. Denies abdominal pain, purulent or malodorous discharge, feeling feverish, chills, dysuria, or any other new symptoms.     OBJECTIVE:   /75   Pulse 101   Temp 36.3 °C (97.3 °F) (Temporal)   Resp 18   Ht 1.727 m (5' 8\")   Wt 86.6 kg (190 lb 14.7 oz)   LMP 2024   SpO2 98%   BMI 29.03 kg/m²    Temp  Min: 36.3 °C (97.3 °F)  Max: 37 °C (98.6 °F)  Pulse  Min: 89  Max: 101  BP  Min: 99/64  Max: 117/75    General:  AAOx3, No acute distress  Cardiovascular: Warm and well perfused  Respiratory: Normal respiratory effort   Abdominal:  Soft, gravid, non-tender, no rebound or guarding, no palpable contractions   Extremities: Warm, well perfused, no edema, no calf tenderness   Pelvic: deferred    NST: Baseline 135/ moderate variability / + accels/ - decels  [place]: acontractile     Labs:   Results from last 7 days   Lab Units 25  0554 25  0543 25  0549   WBC AUTO x10*3/uL 17.9* 22.0* 20.7*   HEMOGLOBIN g/dL 11.1* 11.1* 11.7*   HEMATOCRIT % 32.7* 33.2* 34.9*   PLATELETS AUTO x10*3/uL 216 216 219         ASSESSMENT AND PLAN:     24 y.o.  at 29w1d by ultrasound at 19w3d with PPROM.     PPROM  - Hx of oligo since first formal US on  and again on , JIM 4.2 cm. Amniosure positive at OSH prior to transfer. Speculum exam neg x3 on admission to [facility]. Actim Prom negative on . Given high suspicion for PRROM, planning to continue management as such   - S/p latency antibiotics completed   - S/p BMZ -2/6  - S/p NICU consult   - Abruption labs obtained 3/12 due to new abdominal pain, were wnl. Intermittent abdominal pain. No VB. No evidence of abruption at this time. Likely round ligament " pain.   - CBC 3/18 with WBC 22 however suspect due to rBMZ. CBC then downtrended.  - S/p genetic counseling due to early oligohydramnios - MaterniT Genome screening performed and negative for tested conditions  - Planning to continue inpatient management until 34.0 wga unless delivery indicated prior for fetal or maternal compromise     Fetal status  Fetal Growth Restriction  - NST reactive this AM  - continue daily NSTs while admitted   - Last growth Ultrasound: 3/10 EFW 912g 8%, AC 10% w/normal dopplers  - breech 3/10, weekly BPPs     Routine   - Vaginal irritation: wet prep collected positive for yeast; treated 2/19, remains largely asx  - GBS negative, 2/10. Recollected 3/16, pos.  - BCM: POPs   - 1 hr gtt 99     Pt to be seen and discussed with [facility] Attending, Dr. Montgomery.     Roxi Sierra MD MPH   [facility]  Pager [phone]

## 2025-03-23 VITALS
TEMPERATURE: 97.9 F | WEIGHT: 201.39 LBS | BODY MASS INDEX: 30.52 KG/M2 | OXYGEN SATURATION: 97 % | HEIGHT: 68 IN | RESPIRATION RATE: 18 BRPM | DIASTOLIC BLOOD PRESSURE: 75 MMHG | HEART RATE: 101 BPM | SYSTOLIC BLOOD PRESSURE: 111 MMHG

## 2025-03-23 PROCEDURE — 1220000001 HC OB SEMI-PRIVATE ROOM DAILY

## 2025-03-23 PROCEDURE — 59025 FETAL NON-STRESS TEST: CPT | Mod: GC,25

## 2025-03-23 PROCEDURE — 2500000001 HC RX 250 WO HCPCS SELF ADMINISTERED DRUGS (ALT 637 FOR MEDICARE OP): Performed by: STUDENT IN AN ORGANIZED HEALTH CARE EDUCATION/TRAINING PROGRAM

## 2025-03-23 PROCEDURE — 2500000001 HC RX 250 WO HCPCS SELF ADMINISTERED DRUGS (ALT 637 FOR MEDICARE OP)

## 2025-03-23 RX ADMIN — FAMOTIDINE 20 MG: 20 TABLET ORAL at 20:35

## 2025-03-23 RX ADMIN — FAMOTIDINE 20 MG: 20 TABLET ORAL at 08:11

## 2025-03-23 RX ADMIN — PRENATAL VIT W/ FE FUMARATE-FA TAB 27-0.8 MG 1 TABLET: 27-0.8 TAB at 08:11

## 2025-03-23 ASSESSMENT — PAIN SCALES - GENERAL
PAINLEVEL_OUTOF10: 0 - NO PAIN
PAINLEVEL_OUTOF10: 0 - NO PAIN

## 2025-03-23 NOTE — CARE PLAN
The patient's goals for the shift include rest    The clinical goals for the shift include pt will not develop s/sx of infection    Pt is doing well throughout the night and vss. Pt denies of bleeding or contractions. Pt is leaking small amount of clear non foul fluid. Pt endorses good fetal movement. Pt is resting comfortably and has no other complaints at this time.

## 2025-03-23 NOTE — PROGRESS NOTES
"ANTEPARTUM PROGRESS NOTE   3/23/2025, 7:28 AM     SUBJECTIVE:  NAEON. Doing well. No complaints. Reports continued leakage but no change in color or odor. Denies ctx, vb, or dfm. Denies abdominal pain.    OBJECTIVE:   /75   Pulse 93   Temp 36.5 °C (97.7 °F) (Temporal)   Resp 18   Ht 1.727 m (5' 8\")   Wt 86.6 kg (190 lb 14.7 oz)   LMP 2024   SpO2 97%   BMI 29.03 kg/m²    Temp  Min: 36.5 °C (97.7 °F)  Max: 36.6 °C (97.9 °F)  Pulse  Min: 78  Max: 111  BP  Min: 113/77  Max: 125/75    General:  AAOx3, No acute distress  Cardiovascular: Warm and well perfused  Respiratory: Normal respiratory effort   Abdominal:  Soft, gravid, non-tender, no rebound or guarding, no palpable contractions   Extremities: Warm, well perfused, no edema, no calf tenderness   Pelvic: deferred    NST: Baseline 135/ moderate variability / + accels/ - decels  [place]: acontractile     Labs:   Results from last 7 days   Lab Units 25  0554 25  0543 25  0549   WBC AUTO x10*3/uL 17.9* 22.0* 20.7*   HEMOGLOBIN g/dL 11.1* 11.1* 11.7*   HEMATOCRIT % 32.7* 33.2* 34.9*   PLATELETS AUTO x10*3/uL 216 216 219         ASSESSMENT AND PLAN:     24 y.o.  at 29w2d by ultrasound at 19w3d with PPROM.     PPROM  - Hx of oligo since first formal US on  and again on , JIM 4.2 cm. Amniosure positive at OSH prior to transfer. Speculum exam neg x3 on admission to [facility]. Actim Prom negative on . Given high suspicion for PRROM, planning to continue management as such   - S/p latency antibiotics completed   - S/p BMZ -  - S/p NICU consult   - Abruption labs obtained 3/12 due to new abdominal pain, were wnl. Intermittent abdominal pain. No VB. No evidence of abruption at this time. Likely round ligament pain.   - CBC 3/18 with WBC 22 however suspect due to rBMZ. CBC then downtrended.  - S/p genetic counseling due to early oligohydramnios - MaterniT Genome screening performed and negative for tested conditions  - " Planning to continue inpatient management until 34.0 wga unless delivery indicated prior for fetal or maternal compromise     Fetal status  Fetal Growth Restriction  - NST reactive this AM  - continue daily NSTs while admitted   - Last growth Ultrasound: 3/10 EFW 912g 8%, AC 10% w/normal dopplers  - breech 3/10, weekly BPPs     Routine   - Vaginal irritation: wet prep collected positive for yeast; treated 2/19, remains largely asx  - GBS negative, 2/10. Recollected 3/16, pos.  - BCM: POPs   - 1 hr gtt 99     Pt to be seen and discussed with M Attending, Dr. Montgomery.     Nilda Tao MD, PGY-1  [facility]  Pager [phone]

## 2025-03-23 NOTE — CARE PLAN
The patient's goals for the shift include Sleep this morning    The clinical goals for the shift include Pt will not develop s/sx of infection and fhr will remain reassuring    Pt stable without s/sx of labor or infection. FHR remains reassuring.

## 2025-03-23 NOTE — CARE PLAN
The patient's goals for the shift include rest    The clinical goals for the shift include pt will not develop s/sx of infection

## 2025-03-24 LAB
ABO GROUP (TYPE) IN BLOOD: NORMAL
ANTIBODY SCREEN: NORMAL
BASOPHILS # BLD AUTO: 0.06 X10*3/UL (ref 0–0.1)
BASOPHILS NFR BLD AUTO: 0.3 %
EOSINOPHIL # BLD AUTO: 0.16 X10*3/UL (ref 0–0.7)
EOSINOPHIL NFR BLD AUTO: 0.9 %
ERYTHROCYTE [DISTWIDTH] IN BLOOD BY AUTOMATED COUNT: 12.6 % (ref 11.5–14.5)
HCT VFR BLD AUTO: 34.6 % (ref 36–46)
HGB BLD-MCNC: 11.8 G/DL (ref 12–16)
IMM GRANULOCYTES # BLD AUTO: 0.76 X10*3/UL (ref 0–0.7)
IMM GRANULOCYTES NFR BLD AUTO: 4.2 % (ref 0–0.9)
LYMPHOCYTES # BLD AUTO: 2.54 X10*3/UL (ref 1.2–4.8)
LYMPHOCYTES NFR BLD AUTO: 14.2 %
MCH RBC QN AUTO: 31.6 PG (ref 26–34)
MCHC RBC AUTO-ENTMCNC: 34.1 G/DL (ref 32–36)
MCV RBC AUTO: 93 FL (ref 80–100)
MONOCYTES # BLD AUTO: 1.18 X10*3/UL (ref 0.1–1)
MONOCYTES NFR BLD AUTO: 6.6 %
NEUTROPHILS # BLD AUTO: 13.21 X10*3/UL (ref 1.2–7.7)
NEUTROPHILS NFR BLD AUTO: 73.8 %
NRBC BLD-RTO: 0 /100 WBCS (ref 0–0)
PLATELET # BLD AUTO: 214 X10*3/UL (ref 150–450)
RBC # BLD AUTO: 3.73 X10*6/UL (ref 4–5.2)
RH FACTOR (ANTIGEN D): NORMAL
WBC # BLD AUTO: 17.9 X10*3/UL (ref 4.4–11.3)

## 2025-03-24 PROCEDURE — 1220000001 HC OB SEMI-PRIVATE ROOM DAILY

## 2025-03-24 PROCEDURE — 86901 BLOOD TYPING SEROLOGIC RH(D): CPT

## 2025-03-24 PROCEDURE — 85025 COMPLETE CBC W/AUTO DIFF WBC: CPT

## 2025-03-24 PROCEDURE — 59025 FETAL NON-STRESS TEST: CPT | Mod: GC,25

## 2025-03-24 PROCEDURE — 36415 COLL VENOUS BLD VENIPUNCTURE: CPT

## 2025-03-24 PROCEDURE — 2500000001 HC RX 250 WO HCPCS SELF ADMINISTERED DRUGS (ALT 637 FOR MEDICARE OP): Performed by: STUDENT IN AN ORGANIZED HEALTH CARE EDUCATION/TRAINING PROGRAM

## 2025-03-24 PROCEDURE — 2500000001 HC RX 250 WO HCPCS SELF ADMINISTERED DRUGS (ALT 637 FOR MEDICARE OP)

## 2025-03-24 RX ADMIN — FAMOTIDINE 20 MG: 20 TABLET ORAL at 21:31

## 2025-03-24 RX ADMIN — PRENATAL VIT W/ FE FUMARATE-FA TAB 27-0.8 MG 1 TABLET: 27-0.8 TAB at 09:04

## 2025-03-24 RX ADMIN — FAMOTIDINE 20 MG: 20 TABLET ORAL at 09:04

## 2025-03-24 ASSESSMENT — PAIN - FUNCTIONAL ASSESSMENT
PAIN_FUNCTIONAL_ASSESSMENT: 0-10

## 2025-03-24 ASSESSMENT — PAIN SCALES - GENERAL
PAINLEVEL_OUTOF10: 0 - NO PAIN

## 2025-03-24 NOTE — CARE PLAN
The patient's goals for the shift include to get some rest    The clinical goals for the shift include pt will not develop s/sx of infection and fhr will remain reassuring    Pt is doing well throughout the night and vss. Pt denies of bleeding or kristina. Pt is leaking scant amount of yellowish clear non foul fluid. Pt endorses good fetal movement. Pt is resting comfortably and has no other complaints at this time.

## 2025-03-24 NOTE — CARE PLAN
The patient's goals for the shift include to get some rest    The clinical goals for the shift include Pt will not develop s/sx of infection and fhr will remain reassuring

## 2025-03-24 NOTE — PROGRESS NOTES
"ANTEPARTUM PROGRESS NOTE   3/24/2025, 7:30 AM     SUBJECTIVE:  NAEON. Doing well. No complaints. Reports increased leakage but no change in color or odor. Denies ctx, vb, or dfm. Having BL lower abdominal pain.    OBJECTIVE:   /75 (BP Location: Left arm, Patient Position: Lying)   Pulse 101   Temp 36.6 °C (97.9 °F) (Temporal)   Resp 18   Ht 1.727 m (5' 8\")   Wt 91.3 kg (201 lb 6.2 oz)   LMP 2024   SpO2 97%   BMI 30.62 kg/m²    Temp  Min: 36.6 °C (97.9 °F)  Max: 36.7 °C (98.1 °F)  Pulse  Min: 92  Max: 101  BP  Min: 109/68  Max: 111/75    General:  AAOx3, No acute distress  Cardiovascular: Warm and well perfused  Respiratory: Normal respiratory effort   Abdominal:  Soft, gravid, no fundal tenderness. Mild tenderness over BL lower pelvis, no rebound or guarding, no palpable contractions   Extremities: Warm, well perfused, no edema, no calf tenderness   Pelvic: deferred    NST: Baseline 135/ moderate variability / + accels/ - decels  [place]: acontractile     Labs:   Results from last 7 days   Lab Units 25  0554 25  0543   WBC AUTO x10*3/uL 17.9* 22.0*   HEMOGLOBIN g/dL 11.1* 11.1*   HEMATOCRIT % 32.7* 33.2*   PLATELETS AUTO x10*3/uL 216 216         ASSESSMENT AND PLAN:     24 y.o.  at 29w3d by ultrasound at 19w3d with PPROM.     PPROM  - Hx of oligo since first formal US on  and again on , JIM 4.2 cm. Amniosure positive at OSH prior to transfer. Speculum exam neg x3 on admission to [facility]. Actim Prom negative on . Given high suspicion for PRROM, planning to continue management as such   - S/p latency antibiotics completed   - S/p BMZ -  - S/p NICU consult   - Abruption labs obtained 3/12 due to new abdominal pain, were wnl. Intermittent abdominal pain. No VB. No evidence of abruption at this time. Likely round ligament pain.   - CBC 3/18 with WBC 22 however suspect due to rBMZ. CBC then downtrended.  - S/p genetic counseling due to early oligohydramnios - MaterniT " Genome screening performed and negative for tested conditions  - Planning to continue inpatient management until 34.0 wga unless delivery indicated prior for fetal or maternal compromise     Fetal status  Fetal Growth Restriction  - NST reactive this AM  - continue daily NSTs while admitted   - Last growth Ultrasound: 3/10 EFW 912g 8%, AC 10% w/normal dopplers  - breech 3/10, weekly BPPs     Routine   - Vaginal irritation: wet prep collected positive for yeast; treated 2/19, remains largely asx  - GBS negative, 2/10. Recollected 3/16, pos.  - BCM: POPs   - 1 hr gtt 99     Pt to be seen and discussed with MFM Attending, Dr. Montgomery.     Akila Quijano MD, PGY-2  [facility]  Pager [phone]

## 2025-03-25 ENCOUNTER — APPOINTMENT (OUTPATIENT)
Dept: RADIOLOGY | Facility: HOSPITAL | Age: 25
End: 2025-03-25
Payer: COMMERCIAL

## 2025-03-25 PROCEDURE — 76815 OB US LIMITED FETUS(S): CPT | Performed by: STUDENT IN AN ORGANIZED HEALTH CARE EDUCATION/TRAINING PROGRAM

## 2025-03-25 PROCEDURE — 2500000001 HC RX 250 WO HCPCS SELF ADMINISTERED DRUGS (ALT 637 FOR MEDICARE OP)

## 2025-03-25 PROCEDURE — 76815 OB US LIMITED FETUS(S): CPT

## 2025-03-25 PROCEDURE — 76820 UMBILICAL ARTERY ECHO: CPT | Performed by: STUDENT IN AN ORGANIZED HEALTH CARE EDUCATION/TRAINING PROGRAM

## 2025-03-25 PROCEDURE — 76819 FETAL BIOPHYS PROFIL W/O NST: CPT | Performed by: STUDENT IN AN ORGANIZED HEALTH CARE EDUCATION/TRAINING PROGRAM

## 2025-03-25 PROCEDURE — 59025 FETAL NON-STRESS TEST: CPT | Mod: GC,25

## 2025-03-25 PROCEDURE — 76820 UMBILICAL ARTERY ECHO: CPT

## 2025-03-25 PROCEDURE — 1220000001 HC OB SEMI-PRIVATE ROOM DAILY

## 2025-03-25 PROCEDURE — 2500000001 HC RX 250 WO HCPCS SELF ADMINISTERED DRUGS (ALT 637 FOR MEDICARE OP): Performed by: STUDENT IN AN ORGANIZED HEALTH CARE EDUCATION/TRAINING PROGRAM

## 2025-03-25 RX ADMIN — FAMOTIDINE 20 MG: 20 TABLET ORAL at 09:54

## 2025-03-25 RX ADMIN — PRENATAL VIT W/ FE FUMARATE-FA TAB 27-0.8 MG 1 TABLET: 27-0.8 TAB at 09:54

## 2025-03-25 ASSESSMENT — PAIN - FUNCTIONAL ASSESSMENT
PAIN_FUNCTIONAL_ASSESSMENT: 0-10

## 2025-03-25 ASSESSMENT — PAIN SCALES - GENERAL
PAINLEVEL_OUTOF10: 0 - NO PAIN

## 2025-03-25 NOTE — PROGRESS NOTES
"ANTEPARTUM PROGRESS NOTE   3/25/2025, 6:46 AM     SUBJECTIVE:  No acute events overnight. Patient has no complaints this morning. Denies painful contractions, VB. No changes to LOF. Abd pain is very mild, not bothersome, and intermittent. Reports normal fetal movement.     OBJECTIVE:   /63   Pulse 64   Temp 36.6 °C (97.9 °F) (Temporal)   Resp 16   Ht 1.727 m (5' 8\")   Wt 91.3 kg (201 lb 6.2 oz)   LMP 2024   SpO2 98%   BMI 30.62 kg/m²    Temp  Min: 36.2 °C (97.2 °F)  Max: 36.9 °C (98.4 °F)  Pulse  Min: 64  Max: 99  BP  Min: 100/66  Max: 121/78    General:  AAOx3, No acute distress  Cardiovascular: Warm and well perfused  Respiratory: Normal respiratory effort   Abdominal:  Soft, gravid, non-tender, no rebound or guarding  Extremities: Warm, well perfused    NST: Baseline 130/ mod variability /pos accels/ neg decels  [place]: acontractile     Labs:   Lab Results   Component Value Date    WBC 17.9 (H) 2025    HGB 11.8 (L) 2025    HCT 34.6 (L) 2025     2025       ASSESSMENT AND PLAN:     24 y.o.  at 29w4d by ultrasound at 19w3d with PPROM.      PPROM  - Hx of oligo since first formal US on  and again on , JIM 4.2 cm. Amniosure positive at OSH prior to transfer. Speculum exam neg x3 on admission to [facility]. Actim Prom negative on . Given high suspicion for PRROM, planning to continue management as such   - S/p latency antibiotics completed   - S/p BMZ -, rBMZ 3/16-  - S/p NICU consult   - Abruption labs obtained 3/12 due to new abdominal pain, were wnl. Intermittent abdominal pain. No VB. No evidence of abruption at this time. Likely round ligament pain.   - CBC 3/18 with WBC 22 however suspect due to rBMZ. CBC then downtrended.  - S/p genetic counseling due to early oligohydramnios - MaterniT Genome screening performed and negative for tested conditions  - Planning to continue inpatient management until 34.0 wga unless delivery indicated prior " for fetal or maternal compromise     Fetal status  Fetal Growth Restriction  - NST reactive this AM  - continue daily NSTs while admitted   - Last growth Ultrasound: 3/10 EFW 912g 8%, AC 10% w/normal dopplers  - breech 3/10, weekly BPPs     Routine   - Vaginal irritation: wet prep collected positive for yeast; treated 2/19, remains largely asx  - GBS negative, 2/10. Recollected 3/16, pos.  - BCM: POPs   - 1 hr gtt 99      Pt to be seen and discussed with M Attending, Dr. Montgomery.     Shelby Gallardo MD PGY3  [facility] Pager [phone]

## 2025-03-26 PROCEDURE — 2500000001 HC RX 250 WO HCPCS SELF ADMINISTERED DRUGS (ALT 637 FOR MEDICARE OP)

## 2025-03-26 PROCEDURE — 2500000001 HC RX 250 WO HCPCS SELF ADMINISTERED DRUGS (ALT 637 FOR MEDICARE OP): Performed by: STUDENT IN AN ORGANIZED HEALTH CARE EDUCATION/TRAINING PROGRAM

## 2025-03-26 PROCEDURE — 1220000001 HC OB SEMI-PRIVATE ROOM DAILY

## 2025-03-26 PROCEDURE — 59025 FETAL NON-STRESS TEST: CPT | Mod: GC,25

## 2025-03-26 RX ADMIN — PRENATAL VIT W/ FE FUMARATE-FA TAB 27-0.8 MG 1 TABLET: 27-0.8 TAB at 09:41

## 2025-03-26 RX ADMIN — FAMOTIDINE 20 MG: 20 TABLET ORAL at 09:41

## 2025-03-26 RX ADMIN — FAMOTIDINE 20 MG: 20 TABLET ORAL at 20:22

## 2025-03-26 ASSESSMENT — PAIN - FUNCTIONAL ASSESSMENT
PAIN_FUNCTIONAL_ASSESSMENT: 0-10

## 2025-03-26 ASSESSMENT — PAIN SCALES - GENERAL
PAINLEVEL_OUTOF10: 0 - NO PAIN

## 2025-03-26 NOTE — PROGRESS NOTES
"Tatianna Mojica is a 24 y.o. female on day 49 of admission presenting with  premature rupture of membranes (PPROM) with unknown onset of labor ([facility]).    Jeferson met with Ms. Mojica at bedside today to check in (see Leslye Ibarra's note from 25 for specific social details). Ms. Mojica reports that she is doing \"okay.\" Discussed at length various current stressors such as financials, housing, job, and more. Offered supportive and active listening and worked through some possible solutions. Discussed calling WIC/SNAP to inquire about setting up services, offered  as her landlord is being \"shady,\" and talking with her brother (whom she lives with) about enrolling in PIPP/HEAP to cut down on costs. Discussed how her baby shower is this weekend (3/29/2025 from 1-4PM in the [facility] conference room) and how excited she is for that. Reports that her job is working with her on filing for long-term disability and the stress around that. She misses her dogs (Marcos and Momma) and her two cats. Discussed the possibility of going through [employer] to have them visit, or calling a volunteer [employer] for just some animal interaction. If she would like a visit from [employer], please dial [id number] inside the hospital to speak with a [employer] representative and schedule a visit ahead of time.     Reports no current needs. Will continue to check in. Ms. Mojica appeared to be in better spirits at the conclusion of our conversation. Ms. Mojica states that everyone is welcome at her baby shower this weekend so if you are here, please stop by!    Social Work will continue to remain available for any questions or concerns. Please feel free to reach out.     3/26/2025   CHASE Marrero  OB/GYN   Office Phone: [phone]  Secure chat preferred   "

## 2025-03-26 NOTE — PROGRESS NOTES
"ANTEPARTUM PROGRESS NOTE   3/26/2025, 6:42 AM     SUBJECTIVE: No acute events overnight. Patient has no complaints this morning. Denies painful contractions, abdominal pain, VB. No change in LOF or new vaginal discharge. Reports normal fetal movement.     OBJECTIVE:   /68   Pulse 105   Temp 36.9 °C (98.4 °F) (Temporal)   Resp 16   Ht 1.727 m (5' 8\")   Wt 91.3 kg (201 lb 6.2 oz)   LMP 2024   SpO2 97%   BMI 30.62 kg/m²    Temp  Min: 36.4 °C (97.5 °F)  Max: 36.9 °C (98.4 °F)  Pulse  Min: 98  Max: 105  BP  Min: 106/63  Max: 121/79    General:  AAOx3, No acute distress  Cardiovascular: Warm and well perfused  Respiratory: Normal respiratory effort   Abdominal:  Soft, gravid, non-tender, no rebound or guarding  Extremities: Warm, well perfused    NST: Baseline 140/ mod variability /pos accels/ neg decels  [place]: acontractile     Labs:   Lab Results   Component Value Date    WBC 17.9 (H) 2025    HGB 11.8 (L) 2025    HCT 34.6 (L) 2025     2025       ASSESSMENT AND PLAN:     24 y.o.  at 29w5d by ultrasound at 19w3d with PPROM.      PPROM  - Hx of oligo since first formal US on  and again on , JIM 4.2 cm. Amniosure positive at OSH prior to transfer. Speculum exam neg x3 on admission to [facility]. Actim Prom negative on . Given high suspicion for PRROM, planning to continue management as such   - S/p latency antibiotics completed   - S/p BMZ -, rBMZ 3/16-  - S/p NICU consult   - Abruption labs obtained 3/12 due to new abdominal pain, were wnl. Intermittent abdominal pain. No VB. No evidence of abruption at this time. Likely round ligament pain.   - CBC 3/18 with WBC 22 however suspect due to rBMZ. CBC then downtrended.  - S/p genetic counseling due to early oligohydramnios - MaterniT Genome screening performed and negative for tested conditions  - Planning to continue inpatient management until 34.0 wga unless delivery indicated prior for fetal or " maternal compromise     Fetal status  Fetal Growth Restriction  - NST reactive this AM  - continue daily NSTs while admitted   - Last growth Ultrasound: 3/10 EFW 912g 8%, AC 10% w/normal dopplers  - breech 3/10, weekly BPPs     Routine   - Vaginal irritation: wet prep collected positive for yeast; treated 2/19, now asx  - GBS negative, 2/10. Recollected 3/16, pos.  - BCM: POPs   - 1 hr gtt 99      Pt to be seen and discussed with M Attending, Dr. Montgomery.      Shelby Gallardo MD PGY3  [facility] Pager [phone]

## 2025-03-27 LAB
ABO GROUP (TYPE) IN BLOOD: NORMAL
ANTIBODY SCREEN: NORMAL
RH FACTOR (ANTIGEN D): NORMAL

## 2025-03-27 PROCEDURE — 86901 BLOOD TYPING SEROLOGIC RH(D): CPT

## 2025-03-27 PROCEDURE — 2500000001 HC RX 250 WO HCPCS SELF ADMINISTERED DRUGS (ALT 637 FOR MEDICARE OP): Performed by: STUDENT IN AN ORGANIZED HEALTH CARE EDUCATION/TRAINING PROGRAM

## 2025-03-27 PROCEDURE — 2500000001 HC RX 250 WO HCPCS SELF ADMINISTERED DRUGS (ALT 637 FOR MEDICARE OP)

## 2025-03-27 PROCEDURE — 1220000001 HC OB SEMI-PRIVATE ROOM DAILY

## 2025-03-27 PROCEDURE — 59025 FETAL NON-STRESS TEST: CPT

## 2025-03-27 PROCEDURE — 36415 COLL VENOUS BLD VENIPUNCTURE: CPT

## 2025-03-27 PROCEDURE — 59025 FETAL NON-STRESS TEST: CPT | Mod: GC,25

## 2025-03-27 PROCEDURE — 99232 SBSQ HOSP IP/OBS MODERATE 35: CPT

## 2025-03-27 RX ADMIN — PRENATAL VIT W/ FE FUMARATE-FA TAB 27-0.8 MG 1 TABLET: 27-0.8 TAB at 08:48

## 2025-03-27 RX ADMIN — FAMOTIDINE 20 MG: 20 TABLET ORAL at 20:04

## 2025-03-27 RX ADMIN — FAMOTIDINE 20 MG: 20 TABLET ORAL at 08:49

## 2025-03-27 ASSESSMENT — PAIN SCALES - GENERAL
PAINLEVEL_OUTOF10: 0 - NO PAIN

## 2025-03-27 ASSESSMENT — PAIN - FUNCTIONAL ASSESSMENT
PAIN_FUNCTIONAL_ASSESSMENT: 0-10

## 2025-03-27 NOTE — PROGRESS NOTES
"ANTEPARTUM PROGRESS NOTE   3/27/2025, 6:39 AM     SUBJECTIVE: No acute events overnight. Patient has no complaints this morning. Denies painful contractions, abdominal pain, VB. LOF is stable, no new discharge. Reports normal fetal movement.     OBJECTIVE:   /64 (BP Location: Left arm, Patient Position: Lying)   Pulse 99   Temp 37.2 °C (99 °F) (Temporal)   Resp 18   Ht 1.727 m (5' 8\")   Wt 91.3 kg (201 lb 6.2 oz)   LMP 2024   SpO2 97%   BMI 30.62 kg/m²    Temp  Min: 36.8 °C (98.2 °F)  Max: 37.2 °C (99 °F)  Pulse  Min: 88  Max: 99  BP  Min: 108/64  Max: 124/75    General:  AAOx3, No acute distress  Cardiovascular: Warm and well perfused  Respiratory: Normal respiratory effort   Abdominal:  Soft, gravid, non-tender, no rebound or guarding  Extremities: Warm, well perfused    NST: Baseline 140/ mod variability /pos accels/ neg decels  [place]: acontractile     Labs:   Lab Results   Component Value Date    WBC 17.9 (H) 2025    HGB 11.8 (L) 2025    HCT 34.6 (L) 2025     2025     No results found for: \"GLUCOSE\", \"NA\", \"K\", \"CL\", \"CO2\", \"ANIONGAP\", \"BUN\", \"CREATININE\", \"EGFR\", \"CALCIUM\", \"ALBUMIN\", \"PROT\", \"ALKPHOS\", \"ALT\", \"AST\", \"BILITOT\"    ASSESSMENT AND PLAN:     24 y.o.  at 29w6d by ultrasound at 19w3d with PPROM.      PPROM  - Hx of oligo since first formal US on  and again on , JIM 4.2 cm. Amniosure positive at OSH prior to transfer. Speculum exam neg x3 on admission to [facility]. Actim Prom negative on . Given high suspicion for PRROM, planning to continue management as such   - S/p latency antibiotics completed   - S/p BMZ -, rBMZ 3/16-  - S/p NICU consult   - Abruption labs obtained 3/12 due to new abdominal pain, were wnl. Intermittent abdominal pain. No VB. No evidence of abruption at this time. Likely round ligament pain.   - CBC 3/18 with WBC 22 however suspect due to rBMZ. CBC then downtrended.  - S/p genetic counseling due to early " oligohydramnios - MaterniT Genome screening performed and negative for tested conditions  - Planning to continue inpatient management until 34.0 wga unless delivery indicated prior for fetal or maternal compromise     Fetal status  Fetal Growth Restriction  - NST reactive this AM  - continue daily NSTs while admitted   - Last growth Ultrasound: 3/10 EFW 912g 8%, AC 10% w/normal dopplers  - breech 3/10, weekly BPPs     Routine   - Vaginal irritation: wet prep collected positive for yeast; treated 2/19, now asx  - GBS negative, 2/10. Recollected 3/16, pos.  - BCM: POPs   - 1 hr gtt 99      Pt to be seen and discussed with [facility] Attending, Dr. Montgomery.      Shelby Gallardo MD PGY3  [facility] Pager [phone]

## 2025-03-28 PROBLEM — Z3A.30 30 WEEKS GESTATION OF PREGNANCY (HHS-HCC): Status: ACTIVE | Noted: 2025-02-06

## 2025-03-28 PROBLEM — O36.5920 POOR FETAL GROWTH AFFECTING MANAGEMENT OF MOTHER IN SECOND TRIMESTER (HHS-HCC): Status: ACTIVE | Noted: 2025-03-28

## 2025-03-28 PROCEDURE — 99232 SBSQ HOSP IP/OBS MODERATE 35: CPT | Performed by: OBSTETRICS & GYNECOLOGY

## 2025-03-28 PROCEDURE — 2500000001 HC RX 250 WO HCPCS SELF ADMINISTERED DRUGS (ALT 637 FOR MEDICARE OP)

## 2025-03-28 PROCEDURE — 59025 FETAL NON-STRESS TEST: CPT | Performed by: OBSTETRICS & GYNECOLOGY

## 2025-03-28 PROCEDURE — 1220000001 HC OB SEMI-PRIVATE ROOM DAILY

## 2025-03-28 PROCEDURE — 2500000001 HC RX 250 WO HCPCS SELF ADMINISTERED DRUGS (ALT 637 FOR MEDICARE OP): Performed by: STUDENT IN AN ORGANIZED HEALTH CARE EDUCATION/TRAINING PROGRAM

## 2025-03-28 PROCEDURE — 59025 FETAL NON-STRESS TEST: CPT | Mod: GC | Performed by: OBSTETRICS & GYNECOLOGY

## 2025-03-28 RX ADMIN — FAMOTIDINE 20 MG: 20 TABLET ORAL at 20:11

## 2025-03-28 RX ADMIN — PRENATAL VIT W/ FE FUMARATE-FA TAB 27-0.8 MG 1 TABLET: 27-0.8 TAB at 09:53

## 2025-03-28 RX ADMIN — FAMOTIDINE 20 MG: 20 TABLET ORAL at 09:53

## 2025-03-28 ASSESSMENT — PAIN - FUNCTIONAL ASSESSMENT
PAIN_FUNCTIONAL_ASSESSMENT: 0-10

## 2025-03-28 ASSESSMENT — PAIN SCALES - GENERAL
PAINLEVEL_OUTOF10: 4
PAINLEVEL_OUTOF10: 0 - NO PAIN

## 2025-03-28 ASSESSMENT — PAIN DESCRIPTION - DESCRIPTORS: DESCRIPTORS: SHARP;SHOOTING

## 2025-03-28 NOTE — PROGRESS NOTES
"ANTEPARTUM PROGRESS NOTE   3/28/2025, 6:07 AM     SUBJECTIVE: No acute events overnight. Patient has no complaints this morning. Denies painful contractions, VB. LOF is stable, no new discharge. Reports normal fetal movement. Reports an episode of intermittent sharp pain, 2/10 on her left abdomen that lasted around 45 mins while lying down this morning, but reports it has already improved.     OBJECTIVE:   /67 (BP Location: Right arm, Patient Position: Lying)   Pulse 83   Temp 37 °C (98.6 °F) (Temporal)   Resp 16   Ht 1.727 m (5' 8\")   Wt 91.3 kg (201 lb 6.2 oz)   LMP 2024   SpO2 100%   BMI 30.62 kg/m²    Temp  Min: 36.7 °C (98.1 °F)  Max: 37.2 °C (99 °F)  Pulse  Min: 83  Max: 109  BP  Min: 108/67  Max: 118/74    General:  AAOx3, No acute distress  Cardiovascular: Warm and well perfused, RRR  Respiratory: Normal respiratory effort, CTAB  Abdominal:  Soft, gravid, non-tender, no rebound or guarding  Extremities: Warm, well perfused    NST: Baseline 140/ mod variability /pos accels/ neg decels  [place]: acontractile     Labs:   No results found for: \"WBC\", \"HGB\", \"HCT\", \"PLT\"    No results found for: \"GLUCOSE\", \"NA\", \"K\", \"CL\", \"CO2\", \"ANIONGAP\", \"BUN\", \"CREATININE\", \"EGFR\", \"CALCIUM\", \"ALBUMIN\", \"PROT\", \"ALKPHOS\", \"ALT\", \"AST\", \"BILITOT\"    ASSESSMENT AND PLAN:     24 y.o.  at 30w0d by ultrasound at 19w3d with PPROM.      PPROM  - Hx of oligo since first formal US on  and again on , JIM 4.2 cm. Amniosure positive at OSH prior to transfer. Speculum exam neg x3 on admission to [facility]. Actim Prom negative on . Given high suspicion for PRROM, planning to continue management as such   - S/p latency antibiotics completed   - S/p BMZ -, rBMZ 3/16-  - S/p NICU consult   - Abruption labs obtained 3/12 due to new abdominal pain, were wnl. Intermittent abdominal pain. No VB. No evidence of abruption at this time. Likely round ligament pain.   - CBC 3/18 with WBC 22 however suspect " due to rBMZ. CBC then downtrended.  - S/p genetic counseling due to early oligohydramnios - MaterniT Genome screening performed and negative for tested conditions  - Planning to continue inpatient management until 34.0 wga unless delivery indicated prior for fetal or maternal compromise     Fetal status  Fetal Growth Restriction  - NST reactive this AM  - continue daily NSTs while admitted   - Last growth Ultrasound: 3/10 EFW 912g 8%, AC 10% w/normal dopplers, next growth 3/31  - breech 3/10, weekly BPPs     Routine   - Vaginal irritation: wet prep collected positive for yeast; treated , now asx  - GBS negative, 2/10. Recollected 3/16, pos.  - BCM: POPs   - 1 hr gtt 99   - TDAP 25     Pt to be seen and discussed with M Attending, Dr. Arie Funes MD  PGY-1, Obstetrics and Gynecology   [facility] Pager [phone]     Principal Problem:     premature rupture of membranes (PPROM) with unknown onset of labor ([facility])  Active Problems:    Oligohydramnios antepartum, second trimester, not applicable or unspecified fetus ([facility])    25 weeks gestation of pregnancy ([facility])    Poor fetal growth affecting management of mother in second trimester ([facility])

## 2025-03-28 NOTE — CARE PLAN
Problem: Antepartum  Goal: Maintain pregnancy as long as maternal and/or fetal condition is stable  Outcome: Progressing     Problem:  Labor/Prolonged Premature Rupture of Membranes  Goal: No s/sx of IAI  Outcome: Progressing  Goal: Fewer then 4-6 ct per hour  Outcome: Progressing   The patient's goals for the shift include maintain pregnancy    The clinical goals for the shift include Patient will have no s/sx of infection during shift    Maternal and fetal condition remained stable during shift, patient has had no s/sx of infection and has denied s/sx of PTL.

## 2025-03-29 PROCEDURE — 2500000001 HC RX 250 WO HCPCS SELF ADMINISTERED DRUGS (ALT 637 FOR MEDICARE OP): Performed by: STUDENT IN AN ORGANIZED HEALTH CARE EDUCATION/TRAINING PROGRAM

## 2025-03-29 PROCEDURE — 59025 FETAL NON-STRESS TEST: CPT | Mod: GC

## 2025-03-29 PROCEDURE — 2500000001 HC RX 250 WO HCPCS SELF ADMINISTERED DRUGS (ALT 637 FOR MEDICARE OP)

## 2025-03-29 PROCEDURE — 1220000001 HC OB SEMI-PRIVATE ROOM DAILY

## 2025-03-29 PROCEDURE — 59025 FETAL NON-STRESS TEST: CPT

## 2025-03-29 PROCEDURE — 99232 SBSQ HOSP IP/OBS MODERATE 35: CPT

## 2025-03-29 RX ADMIN — PRENATAL VIT W/ FE FUMARATE-FA TAB 27-0.8 MG 1 TABLET: 27-0.8 TAB at 08:53

## 2025-03-29 RX ADMIN — FAMOTIDINE 20 MG: 20 TABLET ORAL at 21:30

## 2025-03-29 RX ADMIN — FAMOTIDINE 20 MG: 20 TABLET ORAL at 08:53

## 2025-03-29 RX ADMIN — ACETAMINOPHEN 650 MG: 325 TABLET, FILM COATED ORAL at 21:30

## 2025-03-29 ASSESSMENT — PAIN SCALES - GENERAL
PAINLEVEL_OUTOF10: 0 - NO PAIN
PAINLEVEL_OUTOF10: 5 - MODERATE PAIN
PAINLEVEL_OUTOF10: 0 - NO PAIN

## 2025-03-29 ASSESSMENT — PAIN - FUNCTIONAL ASSESSMENT
PAIN_FUNCTIONAL_ASSESSMENT: 0-10

## 2025-03-29 ASSESSMENT — PAIN DESCRIPTION - DESCRIPTORS: DESCRIPTORS: DISCOMFORT

## 2025-03-29 NOTE — PROGRESS NOTES
"ANTEPARTUM PROGRESS NOTE   3/29/2025, 6:15 AM     SUBJECTIVE: No acute events overnight. Patient has no complaints this morning. Denies painful contractions, VB. LOF is stable, no new discharge. Reports normal fetal movement. Reports that the intermittent lower abdominal pain she was having yesterday occurred sparingly yesterday and is much improved today.     OBJECTIVE:   /64   Pulse 90   Temp 36.9 °C (98.4 °F) (Temporal)   Resp 16   Ht 1.727 m (5' 8\")   Wt 91.3 kg (201 lb 6.2 oz)   LMP 2024   SpO2 96%   BMI 30.62 kg/m²    Temp  Min: 36.2 °C (97.2 °F)  Max: 36.9 °C (98.4 °F)  Pulse  Min: 85  Max: 97  BP  Min: 112/64  Max: 119/81    General:  AAOx3, No acute distress  Cardiovascular: Warm and well perfused, RRR  Respiratory: Normal respiratory effort, CTAB  Abdominal:  Soft, gravid, non-tender, no rebound or guarding  Extremities: Warm, well perfused    NST: Baseline 140/ mod variability /pos accels/ neg decels  [place]: acontractile     Labs:   No results found for: \"WBC\", \"HGB\", \"HCT\", \"PLT\"    No results found for: \"GLUCOSE\", \"NA\", \"K\", \"CL\", \"CO2\", \"ANIONGAP\", \"BUN\", \"CREATININE\", \"EGFR\", \"CALCIUM\", \"ALBUMIN\", \"PROT\", \"ALKPHOS\", \"ALT\", \"AST\", \"BILITOT\"    ASSESSMENT AND PLAN:     24 y.o.  at 30w1d by ultrasound at 19w3d with PPROM.      PPROM  - Hx of oligo since first formal US on  and again on , JIM 4.2 cm. Amniosure positive at OSH prior to transfer. Speculum exam neg x3 on admission to [facility]. Actim Prom negative on . Given high suspicion for PRROM, planning to continue management as such   - S/p latency antibiotics completed   - S/p BMZ -, rBMZ 3/16-  - S/p NICU consult   - Abruption labs obtained 3/12 due to new abdominal pain, were wnl. Intermittent abdominal pain. No VB. No evidence of abruption at this time. Likely round ligament pain.   - CBC 3/18 with WBC 22 however suspect due to rBMZ. CBC then downtrended.  - S/p genetic counseling due to early " oligohydramnios - MaterniT Genome screening performed and negative for tested conditions  - Planning to continue inpatient management until 34.0 wga unless delivery indicated prior for fetal or maternal compromise     Fetal status  Fetal Growth Restriction  - NST reactive this AM  - continue daily NSTs while admitted   - Last growth Ultrasound: 3/10 EFW 912g 8%, AC 10% w/normal dopplers, next growth 3/31  - breech 3/10, weekly BPPs     Routine   - Vaginal irritation: wet prep collected positive for yeast; treated , now asx  - GBS negative, 2/10. Recollected 3/16, pos.  - BCM: POPs   - 1 hr gtt 99   - TDAP 25     Pt to be seen and discussed with [facility] Attending, Dr. Arie Funes MD  PGY-1, Obstetrics and Gynecology   [facility] Pager [phone]     Principal Problem:     premature rupture of membranes (PPROM) with unknown onset of labor ([facility])  Active Problems:    Oligohydramnios antepartum, second trimester, not applicable or unspecified fetus ([facility]-H[facility])    30 weeks gestation of pregnancy ([facility])    Poor fetal growth affecting management of mother in second trimester ([facility]-H[facility])

## 2025-03-30 VITALS
DIASTOLIC BLOOD PRESSURE: 59 MMHG | BODY MASS INDEX: 30.52 KG/M2 | OXYGEN SATURATION: 97 % | WEIGHT: 201.39 LBS | RESPIRATION RATE: 18 BRPM | HEIGHT: 68 IN | SYSTOLIC BLOOD PRESSURE: 93 MMHG | TEMPERATURE: 97.7 F | HEART RATE: 98 BPM

## 2025-03-30 LAB
ABO GROUP (TYPE) IN BLOOD: NORMAL
ANTIBODY SCREEN: NORMAL
RH FACTOR (ANTIGEN D): NORMAL

## 2025-03-30 PROCEDURE — 86901 BLOOD TYPING SEROLOGIC RH(D): CPT

## 2025-03-30 PROCEDURE — 1220000001 HC OB SEMI-PRIVATE ROOM DAILY

## 2025-03-30 PROCEDURE — 59025 FETAL NON-STRESS TEST: CPT | Mod: GC

## 2025-03-30 PROCEDURE — 2500000001 HC RX 250 WO HCPCS SELF ADMINISTERED DRUGS (ALT 637 FOR MEDICARE OP)

## 2025-03-30 PROCEDURE — 36415 COLL VENOUS BLD VENIPUNCTURE: CPT

## 2025-03-30 PROCEDURE — 2500000001 HC RX 250 WO HCPCS SELF ADMINISTERED DRUGS (ALT 637 FOR MEDICARE OP): Performed by: STUDENT IN AN ORGANIZED HEALTH CARE EDUCATION/TRAINING PROGRAM

## 2025-03-30 PROCEDURE — 99232 SBSQ HOSP IP/OBS MODERATE 35: CPT

## 2025-03-30 PROCEDURE — 59025 FETAL NON-STRESS TEST: CPT

## 2025-03-30 RX ADMIN — PRENATAL VIT W/ FE FUMARATE-FA TAB 27-0.8 MG 1 TABLET: 27-0.8 TAB at 08:23

## 2025-03-30 RX ADMIN — FAMOTIDINE 20 MG: 20 TABLET ORAL at 08:23

## 2025-03-30 RX ADMIN — CYCLOBENZAPRINE 5 MG: 10 TABLET, FILM COATED ORAL at 00:50

## 2025-03-30 RX ADMIN — FAMOTIDINE 20 MG: 20 TABLET ORAL at 20:03

## 2025-03-30 ASSESSMENT — PAIN SCALES - GENERAL
PAINLEVEL_OUTOF10: 0 - NO PAIN
PAINLEVEL_OUTOF10: 0 - NO PAIN
PAINLEVEL_OUTOF10: 3
PAINLEVEL_OUTOF10: 0 - NO PAIN

## 2025-03-30 ASSESSMENT — PAIN DESCRIPTION - DESCRIPTORS: DESCRIPTORS: DISCOMFORT

## 2025-03-30 ASSESSMENT — PAIN - FUNCTIONAL ASSESSMENT
PAIN_FUNCTIONAL_ASSESSMENT: 0-10

## 2025-03-30 NOTE — PROGRESS NOTES
"ANTEPARTUM PROGRESS NOTE   3/30/2025, 6:17 AM     SUBJECTIVE: No acute events overnight. Patient has no complaints this morning. Denies painful contractions, VB. LOF is stable, no new discharge. Reports normal fetal movement.     OBJECTIVE:   /81   Pulse 109   Temp 36.9 °C (98.4 °F) (Temporal)   Resp 18   Ht 1.727 m (5' 8\")   Wt 91.3 kg (201 lb 6.2 oz)   LMP 2024   SpO2 98%   BMI 30.62 kg/m²    Temp  Min: 36.9 °C (98.4 °F)  Max: 37.1 °C (98.8 °F)  Pulse  Min: 92  Max: 109  BP  Min: 120/81  Max: 124/73    General:  AAOx3, No acute distress  Cardiovascular: Warm and well perfused, RRR  Respiratory: Normal respiratory effort, CTAB  Abdominal:  Soft, gravid, non-tender, no rebound or guarding  Extremities: Warm, well perfused    NST: Baseline 130/ mod variability /pos accels/ neg decels  [place]: acontractile     Labs:   No results found for: \"WBC\", \"HGB\", \"HCT\", \"PLT\"    No results found for: \"GLUCOSE\", \"NA\", \"K\", \"CL\", \"CO2\", \"ANIONGAP\", \"BUN\", \"CREATININE\", \"EGFR\", \"CALCIUM\", \"ALBUMIN\", \"PROT\", \"ALKPHOS\", \"ALT\", \"AST\", \"BILITOT\"    ASSESSMENT AND PLAN:     24 y.o.  at 30w2d by ultrasound at 19w3d with PPROM.      PPROM  - Hx of oligo since first formal US on  and again on , JIM 4.2 cm. Amniosure positive at OSH prior to transfer. Speculum exam neg x3 on admission to [facility]. Actim Prom negative on . Given high suspicion for PRROM, planning to continue management as such   - S/p latency antibiotics completed   - S/p BMZ -, rBMZ 3/16-  - S/p NICU consult   - Abruption labs obtained 3/12 due to new abdominal pain, were wnl. Intermittent abdominal pain. No VB. No evidence of abruption at this time. Likely round ligament pain.   - CBC 3/18 with WBC 22 however suspect due to rBMZ. CBC then downtrended.  - S/p genetic counseling due to early oligohydramnios - MaterniT Genome screening performed and negative for tested conditions  - Planning to continue inpatient management until " 34.0 wga unless delivery indicated prior for fetal or maternal compromise     Fetal status  Fetal Growth Restriction  - NST reactive this AM  - continue daily NSTs while admitted   - Last growth Ultrasound: 3/10 EFW 912g 8%, AC 10% w/normal dopplers, next growth 3/31  - breech 3/10, weekly BPPs     Routine   - Vaginal irritation: wet prep collected positive for yeast; treated , now asx  - GBS negative, 2/10. Recollected 3/16, pos.  - BCM: POPs   - 1 hr gtt 99   - TDAP 25     Pt to be seen and discussed with M Attending, Dr. Arie Funes MD  PGY-1, Obstetrics and Gynecology   [facility] Pager [phone]     Principal Problem:     premature rupture of membranes (PPROM) with unknown onset of labor ([facility])  Active Problems:    Oligohydramnios antepartum, second trimester, not applicable or unspecified fetus ([facility]-HCC)    30 weeks gestation of pregnancy ([facility])    Poor fetal growth affecting management of mother in second trimester ([facility])

## 2025-03-31 PROCEDURE — 2500000001 HC RX 250 WO HCPCS SELF ADMINISTERED DRUGS (ALT 637 FOR MEDICARE OP): Performed by: STUDENT IN AN ORGANIZED HEALTH CARE EDUCATION/TRAINING PROGRAM

## 2025-03-31 PROCEDURE — 99232 SBSQ HOSP IP/OBS MODERATE 35: CPT

## 2025-03-31 PROCEDURE — 1220000001 HC OB SEMI-PRIVATE ROOM DAILY

## 2025-03-31 PROCEDURE — 59025 FETAL NON-STRESS TEST: CPT | Mod: GC

## 2025-03-31 PROCEDURE — 59025 FETAL NON-STRESS TEST: CPT

## 2025-03-31 PROCEDURE — 2500000001 HC RX 250 WO HCPCS SELF ADMINISTERED DRUGS (ALT 637 FOR MEDICARE OP)

## 2025-03-31 RX ADMIN — PRENATAL VIT W/ FE FUMARATE-FA TAB 27-0.8 MG 1 TABLET: 27-0.8 TAB at 08:46

## 2025-03-31 RX ADMIN — FAMOTIDINE 20 MG: 20 TABLET ORAL at 08:46

## 2025-03-31 RX ADMIN — FAMOTIDINE 20 MG: 20 TABLET ORAL at 21:50

## 2025-03-31 ASSESSMENT — PAIN - FUNCTIONAL ASSESSMENT
PAIN_FUNCTIONAL_ASSESSMENT: 0-10

## 2025-03-31 ASSESSMENT — PAIN SCALES - GENERAL
PAINLEVEL_OUTOF10: 0 - NO PAIN

## 2025-03-31 NOTE — CARE PLAN
Problem: Antepartum  Goal: Avoid/minimize constipation  Outcome: Progressing  Goal: No decrease in circulation/VTE  Outcome: Progressing  Goal: Minimize anxiety/maximize coping  Outcome: Progressing  Goal: Maintain pregnancy as long as maternal and/or fetal condition is stable  Outcome: Progressing     Problem:  Labor/Prolonged Premature Rupture of Membranes  Goal: No s/sx of IAI  Outcome: Progressing  Goal: Fewer then 4-6 ct per hour  Outcome: Progressing    The clinical goals for the shift include no s/sx of infection    Over the shift, the patient did make progress toward the following goals. Patient had stable VS and assessments. The patient remained free from s/sx of infection and PTL. FHR remains reassuring.

## 2025-03-31 NOTE — PROGRESS NOTES
"ANTEPARTUM PROGRESS NOTE   3/31/2025, 6:45 AM     SUBJECTIVE: No acute events overnight. Patient has no complaints this morning. Denies painful contractions, VB. LOF is stable, no new discharge. Reports normal fetal movement.     OBJECTIVE:   /69 (BP Location: Left arm, Patient Position: Lying)   Pulse 105   Temp 37 °C (98.6 °F) (Temporal)   Resp 18   Ht 1.727 m (5' 8\")   Wt 91.3 kg (201 lb 6.2 oz)   LMP 2024   SpO2 97%   BMI 30.62 kg/m²    Temp  Min: 36.5 °C (97.7 °F)  Max: 37 °C (98.6 °F)  Pulse  Min: 98  Max: 105  BP  Min: 93/59  Max: 129/69    General:  AAOx3, No acute distress  Cardiovascular: Warm and well perfused, RRR  Respiratory: Normal respiratory effort, CTAB  Abdominal:  Soft, gravid, non-tender, no rebound or guarding  Extremities: Warm, well perfused    NST: Baseline 140/ mod variability /pos accels/ neg decels  [place]: acontractile     Labs:   No results found for: \"WBC\", \"HGB\", \"HCT\", \"PLT\"    No results found for: \"GLUCOSE\", \"NA\", \"K\", \"CL\", \"CO2\", \"ANIONGAP\", \"BUN\", \"CREATININE\", \"EGFR\", \"CALCIUM\", \"ALBUMIN\", \"PROT\", \"ALKPHOS\", \"ALT\", \"AST\", \"BILITOT\"    ASSESSMENT AND PLAN:     24 y.o.  at 30w3d by ultrasound at 19w3d with PPROM.      PPROM  - Hx of oligo since first formal US on  and again on , JIM 4.2 cm. Amniosure positive at OSH prior to transfer. Speculum exam neg x3 on admission to [facility]. Actim Prom negative on . Given high suspicion for PRROM, planning to continue management as such   - S/p latency antibiotics completed   - S/p BMZ -, rBMZ 3/16-  - S/p NICU consult   - Abruption labs obtained 3/12 due to new abdominal pain, were wnl. Intermittent abdominal pain. No VB. No evidence of abruption at this time. Likely round ligament pain.   - CBC 3/18 with WBC 22 however suspect due to rBMZ. CBC then downtrended.  - S/p genetic counseling due to early oligohydramnios - MaterniT Genome screening performed and negative for tested conditions  - " Planning to continue inpatient management until 34.0 wga unless delivery indicated prior for fetal or maternal compromise     Fetal status  Fetal Growth Restriction  - NST reactive this AM  - continue daily NSTs while admitted   - Last growth Ultrasound: 3/10 EFW 912g 8%, AC 10% w/normal dopplers, next growth tomorrow  - breech 3/10, weekly BPPs     Routine   - Vaginal irritation: wet prep collected positive for yeast; treated , now asx  - GBS negative, 2/10. Recollected 3/16, pos.  - BCM: POPs   - 1 hr gtt 99   - TDAP 25     Pt to be seen and discussed with [facility] Attending, Dr. Arie Funes MD  PGY-1, Obstetrics and Gynecology   [facility] Pager [phone]     Principal Problem:     premature rupture of membranes (PPROM) with unknown onset of labor ([facility])  Active Problems:    Oligohydramnios antepartum, second trimester, not applicable or unspecified fetus ([facility]-HCC)    30 weeks gestation of pregnancy ([facility])    Poor fetal growth affecting management of mother in second trimester ([facility]-H[facility])

## 2025-04-01 ENCOUNTER — APPOINTMENT (OUTPATIENT)
Dept: RADIOLOGY | Facility: HOSPITAL | Age: 25
End: 2025-04-01
Payer: COMMERCIAL

## 2025-04-01 ENCOUNTER — HOSPITAL ENCOUNTER (OUTPATIENT)
Facility: HOSPITAL | Age: 25
Setting detail: SURGERY ADMIT
End: 2025-04-01
Attending: OBSTETRICS & GYNECOLOGY | Admitting: OBSTETRICS & GYNECOLOGY
Payer: COMMERCIAL

## 2025-04-01 DIAGNOSIS — O36.5990 FETAL GROWTH RESTRICTION ANTEPARTUM (HHS-HCC): ICD-10-CM

## 2025-04-01 DIAGNOSIS — Z87.59 HISTORY OF PRETERM PREMATURE RUPTURE OF MEMBRANES (PPROM): Primary | ICD-10-CM

## 2025-04-01 PROCEDURE — 76816 OB US FOLLOW-UP PER FETUS: CPT

## 2025-04-01 PROCEDURE — 2500000001 HC RX 250 WO HCPCS SELF ADMINISTERED DRUGS (ALT 637 FOR MEDICARE OP): Performed by: STUDENT IN AN ORGANIZED HEALTH CARE EDUCATION/TRAINING PROGRAM

## 2025-04-01 PROCEDURE — 99232 SBSQ HOSP IP/OBS MODERATE 35: CPT

## 2025-04-01 PROCEDURE — 2500000001 HC RX 250 WO HCPCS SELF ADMINISTERED DRUGS (ALT 637 FOR MEDICARE OP)

## 2025-04-01 PROCEDURE — 1220000001 HC OB SEMI-PRIVATE ROOM DAILY

## 2025-04-01 PROCEDURE — 76820 UMBILICAL ARTERY ECHO: CPT

## 2025-04-01 PROCEDURE — 76818 FETAL BIOPHYS PROFILE W/NST: CPT | Performed by: OBSTETRICS & GYNECOLOGY

## 2025-04-01 PROCEDURE — 76820 UMBILICAL ARTERY ECHO: CPT | Performed by: OBSTETRICS & GYNECOLOGY

## 2025-04-01 PROCEDURE — 76816 OB US FOLLOW-UP PER FETUS: CPT | Performed by: OBSTETRICS & GYNECOLOGY

## 2025-04-01 RX ORDER — METOCLOPRAMIDE 10 MG/1
10 TABLET ORAL ONCE
Status: COMPLETED | OUTPATIENT
Start: 2025-04-01 | End: 2025-04-01

## 2025-04-01 RX ORDER — DIPHENHYDRAMINE HCL 25 MG
25 CAPSULE ORAL ONCE
Status: COMPLETED | OUTPATIENT
Start: 2025-04-01 | End: 2025-04-01

## 2025-04-01 RX ADMIN — METOCLOPRAMIDE 10 MG: 10 TABLET ORAL at 06:01

## 2025-04-01 RX ADMIN — ACETAMINOPHEN 650 MG: 325 TABLET, FILM COATED ORAL at 06:00

## 2025-04-01 RX ADMIN — FAMOTIDINE 20 MG: 20 TABLET ORAL at 08:58

## 2025-04-01 RX ADMIN — ACETAMINOPHEN 650 MG: 325 TABLET, FILM COATED ORAL at 00:38

## 2025-04-01 RX ADMIN — FAMOTIDINE 20 MG: 20 TABLET ORAL at 20:09

## 2025-04-01 RX ADMIN — PRENATAL VIT W/ FE FUMARATE-FA TAB 27-0.8 MG 1 TABLET: 27-0.8 TAB at 08:58

## 2025-04-01 RX ADMIN — DIPHENHYDRAMINE HYDROCHLORIDE 25 MG: 25 CAPSULE ORAL at 06:00

## 2025-04-01 ASSESSMENT — PAIN - FUNCTIONAL ASSESSMENT
PAIN_FUNCTIONAL_ASSESSMENT: 0-10

## 2025-04-01 ASSESSMENT — PAIN DESCRIPTION - DESCRIPTORS
DESCRIPTORS: HEADACHE

## 2025-04-01 ASSESSMENT — PAIN SCALES - GENERAL
PAINLEVEL_OUTOF10: 0 - NO PAIN
PAINLEVEL_OUTOF10: 5 - MODERATE PAIN
PAINLEVEL_OUTOF10: 0 - NO PAIN
PAINLEVEL_OUTOF10: 0 - NO PAIN
PAINLEVEL_OUTOF10: 4
PAINLEVEL_OUTOF10: 2

## 2025-04-01 NOTE — PROGRESS NOTES
"ANTEPARTUM PROGRESS NOTE   2025, 7:23 AM     SUBJECTIVE: Patient has no complaints this morning. Denies painful contractions, VB. Denies changes in vaginal discharge. Reports normal fetal movement.     OBJECTIVE:   /72   Pulse 84   Temp 37.1 °C (98.8 °F) (Temporal)   Resp 16   Ht 1.727 m (5' 8\")   Wt 91.3 kg (201 lb 6.2 oz)   LMP 2024   SpO2 98%   BMI 30.62 kg/m²    Temp  Min: 36.8 °C (98.2 °F)  Max: 37.2 °C (99 °F)  Pulse  Min: 84  Max: 95  BP  Min: 100/68  Max: 120/72    General:  AAOx3, No acute distress  Cardiovascular: Warm and well perfused  Respiratory: Normal respiratory effort  Abdominal:  Soft, gravid, non-tender, no rebound or guarding  Extremities: Warm, well perfused    NST: Baseline 140/ mod variability /pos accels/ neg decels  [place]: acontractile       ASSESSMENT AND PLAN:     24 y.o.  at 30w4d by ultrasound at 19w3d with PPROM.      PPROM  - Hx of oligo since first formal US on  and again on , JIM 4.2 cm. Amniosure positive at OSH prior to transfer. Speculum exam neg x3 on admission to [facility]. Actim Prom negative on . Given high suspicion for PRROM, planning to continue management as such   - S/p latency antibiotics completed   - S/p BMZ -, rBMZ 3/16-  - S/p NICU consult   - Abruption labs obtained 3/12 due to new abdominal pain, were wnl. Intermittent abdominal pain. No VB. No evidence of abruption at this time. Likely round ligament pain and now resolved.  - CBC 3/18 with WBC 22 however suspect due to rBMZ. CBC then downtrended.  - S/p genetic counseling due to early oligohydramnios - MaterniT Genome screening performed and negative for tested conditions  - Planning to continue inpatient management until 34.0 wga unless delivery indicated prior for fetal or maternal compromise     Fetal status  Fetal Growth Restriction  - NST reactive this AM  - continue daily NSTs while admitted   - Last growth Ultrasound: 3/10 EFW 912g 8%, AC 10% w/normal " dopplers, next growth today - FGR at 6%ile  - breech 3/10, weekly BPPs     Routine   - Vaginal irritation: wet prep collected positive for yeast; treated , now asx  - GBS negative, 2/10. Recollected 3/16, pos.  - BCM: POPs   - 1 hr gtt 99   - TDAP 25     Pt to be seen and discussed with [facility] Attending, Dr. Arie Easley MD   PGY-3, Obstetrics and Gynecology   [facility] Pager [phone]     Principal Problem:     premature rupture of membranes (PPROM) with unknown onset of labor ([facility])  Active Problems:    Oligohydramnios antepartum, second trimester, not applicable or unspecified fetus ([facility]-H[facility])    30 weeks gestation of pregnancy ([facility])    Poor fetal growth affecting management of mother in second trimester ([facility])

## 2025-04-02 LAB
ABO GROUP (TYPE) IN BLOOD: NORMAL
ANTIBODY SCREEN: NORMAL
RH FACTOR (ANTIGEN D): NORMAL

## 2025-04-02 PROCEDURE — 59025 FETAL NON-STRESS TEST: CPT

## 2025-04-02 PROCEDURE — 1220000001 HC OB SEMI-PRIVATE ROOM DAILY

## 2025-04-02 PROCEDURE — 59025 FETAL NON-STRESS TEST: CPT | Mod: GC

## 2025-04-02 PROCEDURE — 2500000001 HC RX 250 WO HCPCS SELF ADMINISTERED DRUGS (ALT 637 FOR MEDICARE OP): Performed by: STUDENT IN AN ORGANIZED HEALTH CARE EDUCATION/TRAINING PROGRAM

## 2025-04-02 PROCEDURE — 99232 SBSQ HOSP IP/OBS MODERATE 35: CPT

## 2025-04-02 PROCEDURE — 36415 COLL VENOUS BLD VENIPUNCTURE: CPT

## 2025-04-02 PROCEDURE — 86901 BLOOD TYPING SEROLOGIC RH(D): CPT

## 2025-04-02 PROCEDURE — 2500000001 HC RX 250 WO HCPCS SELF ADMINISTERED DRUGS (ALT 637 FOR MEDICARE OP)

## 2025-04-02 RX ADMIN — FAMOTIDINE 20 MG: 20 TABLET ORAL at 20:20

## 2025-04-02 RX ADMIN — FAMOTIDINE 20 MG: 20 TABLET ORAL at 09:34

## 2025-04-02 RX ADMIN — PRENATAL VIT W/ FE FUMARATE-FA TAB 27-0.8 MG 1 TABLET: 27-0.8 TAB at 09:34

## 2025-04-02 ASSESSMENT — PAIN SCALES - GENERAL
PAINLEVEL_OUTOF10: 0 - NO PAIN

## 2025-04-02 ASSESSMENT — PAIN - FUNCTIONAL ASSESSMENT
PAIN_FUNCTIONAL_ASSESSMENT: 0-10

## 2025-04-02 NOTE — PROGRESS NOTES
Tatianna Mojica is a 24 y.o. female on day 56 of admission presenting with  premature rupture of membranes (PPROM) with unknown onset of labor ([facility]).    SWer attempted to meet with Ms. Mojica at bedside to check in d/t long-stay admission but Ms. Mojica was resting comfortably in bed, asleep. Will attempt to check in at a later time.    2025   CHASE [facility]rero  OB/GYN   Office Phone: [phone]  Secure chat preferred

## 2025-04-02 NOTE — PROGRESS NOTES
"ANTEPARTUM PROGRESS NOTE   2025, 6:53 AM     SUBJECTIVE: Patient reports intermittent breast pain and concerned about possible mass behind both breast nipples. Denies painful contractions, VB. Denies changes in vaginal discharge. Reports normal fetal movement.     OBJECTIVE:   /72   Pulse 89   Temp 37.6 °C (99.7 °F) (Temporal)   Resp 16   Ht 1.727 m (5' 8\")   Wt 91.3 kg (201 lb 6.2 oz)   LMP 2024   SpO2 98%   BMI 30.62 kg/m²    Temp  Min: 36.7 °C (98.1 °F)  Max: 37.6 °C (99.7 °F)  Pulse  Min: 69  Max: 89  BP  Min: 106/67  Max: 110/72    General:  AAOx3, No acute distress  Cardiovascular: Warm and well perfused  Respiratory: Normal respiratory effort  Breast: normal appearing bilateral breast, no masses palpated  Abdominal:  Soft, gravid, non-tender, no rebound or guarding  Extremities: Warm, well perfused    NST: Baseline 140/ mod variability /pos accels/ neg decels  [place]: acontractile       ASSESSMENT AND PLAN:     24 y.o.  at 30w5d by ultrasound at 19w3d with PPROM.      PPROM  - Hx of oligo since first formal US on  and again on , JIM 4.2 cm. Amniosure positive at OSH prior to transfer. Speculum exam neg x3 on admission to [facility]. Actim Prom negative on . Given high suspicion for PRROM, planning to continue management as such, patient now reports continued leakage of fluid  - S/p latency antibiotics completed   - S/p BMZ -, rBMZ 3/16-  - S/p NICU consult   - CBC 3/18 with WBC 22 however suspect due to rBMZ. CBC then downtrended.  - S/p genetic counseling due to early oligohydramnios - MaterniT Genome screening performed and negative for tested conditions  - Planning to continue inpatient management until 34.0 wga unless delivery indicated prior for fetal or maternal compromise    Breast pain  - No mass palpated on exam, suspect secondary to pregnancy changes  - Discussed Tylenol and hot packs prn for symptoms     Fetal status  Fetal Growth Restriction  - NST " reactive this AM  - continue daily NSTs while admitted   - Last growth Ultrasound:  EFW 1326g (6%), AC 6%, normal dopplers   - breech , weekly BPPs     Routine   - GBS negative, 2/10. Recollected 3/16, pos.  - BCM: POPs   - 1 hr gtt 99   - TDAP 25     Pt to be seen and discussed with [facility] Attending, Dr. Arie Easley MD   PGY-3, Obstetrics and Gynecology   [facility] Pager [phone]     Principal Problem:     premature rupture of membranes (PPROM) with unknown onset of labor ([facility]HS-HCC)  Active Problems:    Oligohydramnios antepartum, second trimester, not applicable or unspecified fetus (HHS-HCC)    30 weeks gestation of pregnancy ([facility]HS-HCC)    Poor fetal growth affecting management of mother in second trimester (HHS-HCC)

## 2025-04-03 PROCEDURE — 59025 FETAL NON-STRESS TEST: CPT | Mod: GC

## 2025-04-03 PROCEDURE — 2500000001 HC RX 250 WO HCPCS SELF ADMINISTERED DRUGS (ALT 637 FOR MEDICARE OP): Performed by: STUDENT IN AN ORGANIZED HEALTH CARE EDUCATION/TRAINING PROGRAM

## 2025-04-03 PROCEDURE — 2500000001 HC RX 250 WO HCPCS SELF ADMINISTERED DRUGS (ALT 637 FOR MEDICARE OP)

## 2025-04-03 PROCEDURE — 59025 FETAL NON-STRESS TEST: CPT

## 2025-04-03 PROCEDURE — 99232 SBSQ HOSP IP/OBS MODERATE 35: CPT

## 2025-04-03 PROCEDURE — 1220000001 HC OB SEMI-PRIVATE ROOM DAILY

## 2025-04-03 RX ADMIN — FAMOTIDINE 20 MG: 20 TABLET ORAL at 20:19

## 2025-04-03 RX ADMIN — PRENATAL VIT W/ FE FUMARATE-FA TAB 27-0.8 MG 1 TABLET: 27-0.8 TAB at 08:41

## 2025-04-03 RX ADMIN — FAMOTIDINE 20 MG: 20 TABLET ORAL at 08:40

## 2025-04-03 ASSESSMENT — PAIN - FUNCTIONAL ASSESSMENT
PAIN_FUNCTIONAL_ASSESSMENT: 0-10

## 2025-04-03 ASSESSMENT — PAIN SCALES - GENERAL
PAINLEVEL_OUTOF10: 0 - NO PAIN

## 2025-04-03 NOTE — PROGRESS NOTES
Tatianna Mojica is a 24 y.o. female on day 57 of admission presenting with  premature rupture of membranes (PPROM) with unknown onset of labor ([facility]).    Met briefly with Ms. Mojica at bedside this afternoon. Reports that she is doing good, getting some sleep, and staying busy with arts and crafts. Reports FOB is going to look at some houses today and that is exciting for her. Denies needs at this time. Will remain available and check in next week.    Social Work will continue to remain available for any questions or concerns. Please feel free to reach out.     4/3/2025   CHASE Marrero  OB/GYN   Office Phone: [phone]  Secure chat preferred

## 2025-04-03 NOTE — CARE PLAN
The patient's goals for the shift include rest    The clinical goals for the shift include no s/sx of infection    Pt is doing well throughout the night and vss. Pt denies of bleeding or contractions. Pt leaking small to moderate amount of clear non foul fluid. Pt endorses good fetal movement. Pt is resting comfortably and has no other complaints at this time.

## 2025-04-03 NOTE — PROGRESS NOTES
"ANTEPARTUM PROGRESS NOTE   4/3/2025, 8:48 AM     SUBJECTIVE: No acute events overnight. Denies painful contractions, VB. Denies changes in vaginal discharge. Reports normal fetal movement.     OBJECTIVE:   /81 (BP Location: Left arm, Patient Position: Lying)   Pulse 100   Temp 36.6 °C (97.9 °F) (Temporal)   Resp 18   Ht 1.727 m (5' 8\")   Wt 91.3 kg (201 lb 6.2 oz)   LMP 2024   SpO2 96%   BMI 30.62 kg/m²    Temp  Min: 36.1 °C (97 °F)  Max: 36.6 °C (97.9 °F)  Pulse  Min: 73  Max: 107  BP  Min: 118/82  Max: 123/81    General:  AAOx3, No acute distress  Cardiovascular: Warm and well perfused  Respiratory: Normal respiratory effort  Abdominal:  Soft, gravid, non-tender, no rebound or guarding  Extremities: Warm, well perfused    NST: Baseline 135/ mod variability /pos accels/ neg decels  [place]: acontractile       ASSESSMENT AND PLAN:     24 y.o.  at 30w6d by ultrasound at 19w3d with PPROM.      PPROM  - Hx of oligo since first formal US on  and again on , JIM 4.2 cm. Amniosure positive at OSH prior to transfer. Speculum exam neg x3 on admission to [facility]. Actim Prom negative on . Given high suspicion for PRROM, planning to continue management as such, patient now reports continued leakage of fluid  - S/p latency antibiotics completed   - S/p BMZ -, rBMZ 3/16-  - S/p NICU consult   - CBC 3/18 with WBC 22 however suspect due to rBMZ. CBC then downtrended.  - S/p genetic counseling due to early oligohydramnios - MaterniT Genome screening performed and negative for tested conditions  - Planning to continue inpatient management until 34.0 wga unless delivery indicated prior for fetal or maternal compromise    Fetal status  Fetal Growth Restriction  - NST reactive this AM  - continue daily NSTs while admitted   - Last growth Ultrasound:  EFW 1326g (6%), AC 6%, normal dopplers   - breech , weekly BPPs     Routine   - GBS negative, 2/10. Recollected 3/16, pos.  - BCM: POPs   - " 1 hr gtt 99   - TDAP 25     Pt to be seen and discussed with [facility] Attending, Dr. Arie Easley MD   PGY-3, Obstetrics and Gynecology   [facility] Pager [phone]     Principal Problem:     premature rupture of membranes (PPROM) with unknown onset of labor (HHS-HCC)  Active Problems:    Oligohydramnios antepartum, second trimester, not applicable or unspecified fetus (HHS-HCC)    30 weeks gestation of pregnancy (HHS-HCC)    Poor fetal growth affecting management of mother in second trimester (HHS-HCC)

## 2025-04-04 PROCEDURE — 59025 FETAL NON-STRESS TEST: CPT

## 2025-04-04 PROCEDURE — 1220000001 HC OB SEMI-PRIVATE ROOM DAILY

## 2025-04-04 PROCEDURE — 59025 FETAL NON-STRESS TEST: CPT | Mod: GC

## 2025-04-04 PROCEDURE — 2500000001 HC RX 250 WO HCPCS SELF ADMINISTERED DRUGS (ALT 637 FOR MEDICARE OP): Performed by: STUDENT IN AN ORGANIZED HEALTH CARE EDUCATION/TRAINING PROGRAM

## 2025-04-04 PROCEDURE — 2500000001 HC RX 250 WO HCPCS SELF ADMINISTERED DRUGS (ALT 637 FOR MEDICARE OP)

## 2025-04-04 PROCEDURE — 99232 SBSQ HOSP IP/OBS MODERATE 35: CPT

## 2025-04-04 RX ADMIN — FAMOTIDINE 20 MG: 20 TABLET ORAL at 09:11

## 2025-04-04 RX ADMIN — PRENATAL VIT W/ FE FUMARATE-FA TAB 27-0.8 MG 1 TABLET: 27-0.8 TAB at 09:11

## 2025-04-04 RX ADMIN — FAMOTIDINE 20 MG: 20 TABLET ORAL at 21:29

## 2025-04-04 ASSESSMENT — PAIN SCALES - GENERAL
PAINLEVEL_OUTOF10: 0 - NO PAIN

## 2025-04-04 ASSESSMENT — PAIN - FUNCTIONAL ASSESSMENT
PAIN_FUNCTIONAL_ASSESSMENT: 0-10

## 2025-04-04 NOTE — CARE PLAN
The patient's goals for the shift include rest    The clinical goals for the shift include no s/sx of infection    Pt is doing well throughout the night and vss. Pt is leaking small to moderate amount of clear non foul fluid. Pt denies of bleeding or contractions. Pt endorses good fetal movement. Pt is resting comfortably and has no other complaints at this time.

## 2025-04-04 NOTE — PROGRESS NOTES
"ANTEPARTUM PROGRESS NOTE   2025, 6:54 AM     SUBJECTIVE: No acute events overnight. Denies painful contractions, VB. Denies changes in vaginal discharge, still having leakage of fluid. Reports normal fetal movement.    Says she has been trying to keep occupied with crafts and calling family and friends. She has become more nocturnal since it is peaceful at night.     OBJECTIVE:   /68   Pulse 92   Temp 36.8 °C (98.2 °F) (Temporal)   Resp 18   Ht 1.727 m (5' 8\")   Wt 91.3 kg (201 lb 6.2 oz)   LMP 2024   SpO2 97%   BMI 30.62 kg/m²    Temp  Min: 36.6 °C (97.9 °F)  Max: 36.8 °C (98.2 °F)  Pulse  Min: 81  Max: 100  BP  Min: 96/72  Max: 127/68    General: No acute distress  Cardiovascular: Warm and well perfused  Respiratory: Normal respiratory effort  Abdominal:  Soft, gravid, non-tender, no rebound or guarding  Extremities: Warm, well perfused    NST: Baseline 135 / mod variability / pos accels / neg decels  [place]: acontractile       ASSESSMENT AND PLAN:     24 y.o.  at 31w0d by ultrasound at 19w3d with PPROM.      PPROM  - Hx of oligo since first formal US on  and again on , JIM 4.2 cm. Amniosure positive at OSH prior to transfer. Speculum exam neg x3 on admission to [facility]. Actim Prom negative on . Given high suspicion for PRROM, planning to continue management as such, patient now reports continued leakage of fluid  - S/p latency antibiotics completed   - S/p BMZ -, rBMZ 3/16-  - S/p NICU consult   - S/p genetic counseling due to early oligohydramnios - MaterniT Genome screening performed and negative for tested conditions  - Planning to continue inpatient management until 34.0 wga unless delivery indicated prior for fetal or maternal compromise    Fetal status  Fetal Growth Restriction  - NST reactive this AM  - continue daily NSTs while admitted   - Last growth Ultrasound:  EFW 1326g (6%), AC 6%, normal dopplers   - breech , weekly BPPs     Routine   - GBS " negative, 2/10. Recollected 3/16, pos.  - BCM: POPs   - 1 hr gtt 99   - TDAP 25     Pt to be seen and discussed with [facility] Attending. Dr. Olivares.    BRANDON ESPINOZA   Medical Student, MS-3    Patient seen and evaluated with medical student. Agree with assessment above, edits made within text.  Alyse Easley MD PGY-3    Principal Problem:     premature rupture of membranes (PPROM) with unknown onset of labor (HHS-HCC)  Active Problems:    Oligohydramnios antepartum, second trimester, not applicable or unspecified fetus (HHS-HCC)    30 weeks gestation of pregnancy (HHS-HCC)    Poor fetal growth affecting management of mother in second trimester (HHS-HCC)

## 2025-04-05 LAB
ABO GROUP (TYPE) IN BLOOD: NORMAL
ANTIBODY SCREEN: NORMAL
RH FACTOR (ANTIGEN D): NORMAL

## 2025-04-05 PROCEDURE — 2500000001 HC RX 250 WO HCPCS SELF ADMINISTERED DRUGS (ALT 637 FOR MEDICARE OP)

## 2025-04-05 PROCEDURE — 2500000001 HC RX 250 WO HCPCS SELF ADMINISTERED DRUGS (ALT 637 FOR MEDICARE OP): Performed by: STUDENT IN AN ORGANIZED HEALTH CARE EDUCATION/TRAINING PROGRAM

## 2025-04-05 PROCEDURE — 99232 SBSQ HOSP IP/OBS MODERATE 35: CPT

## 2025-04-05 PROCEDURE — 1220000001 HC OB SEMI-PRIVATE ROOM DAILY

## 2025-04-05 PROCEDURE — 36415 COLL VENOUS BLD VENIPUNCTURE: CPT

## 2025-04-05 PROCEDURE — 59025 FETAL NON-STRESS TEST: CPT | Mod: GC

## 2025-04-05 PROCEDURE — 59025 FETAL NON-STRESS TEST: CPT

## 2025-04-05 PROCEDURE — 86901 BLOOD TYPING SEROLOGIC RH(D): CPT

## 2025-04-05 RX ADMIN — FAMOTIDINE 20 MG: 20 TABLET ORAL at 08:57

## 2025-04-05 RX ADMIN — FAMOTIDINE 20 MG: 20 TABLET ORAL at 20:28

## 2025-04-05 RX ADMIN — PRENATAL VIT W/ FE FUMARATE-FA TAB 27-0.8 MG 1 TABLET: 27-0.8 TAB at 08:56

## 2025-04-05 ASSESSMENT — PAIN SCALES - GENERAL
PAINLEVEL_OUTOF10: 0 - NO PAIN

## 2025-04-05 ASSESSMENT — PAIN - FUNCTIONAL ASSESSMENT
PAIN_FUNCTIONAL_ASSESSMENT: 0-10

## 2025-04-05 NOTE — PROGRESS NOTES
"ANTEPARTUM PROGRESS NOTE   2025, 6:44 AM     SUBJECTIVE: No acute events overnight. Denies painful contractions, VB. Denies changes in vaginal discharge. Reports normal fetal movement.    OBJECTIVE:   /71   Pulse 92   Temp 36.8 °C (98.2 °F) (Temporal)   Resp 18   Ht 1.727 m (5' 8\")   Wt 91.3 kg (201 lb 6.2 oz)   LMP 2024   SpO2 96%   BMI 30.62 kg/m²    Temp  Min: 36.7 °C (98.1 °F)  Max: 37.1 °C (98.8 °F)  Pulse  Min: 85  Max: 97  BP  Min: 96/55  Max: 123/71    General: No acute distress  Cardiovascular: Warm and well perfused  Respiratory: Normal respiratory effort  Abdominal:  Soft, gravid, non-tender, no rebound or guarding  Extremities: Warm, well perfused    NST: Baseline 135 / mod variability / pos accels / neg decels  [place]: acontractile       ASSESSMENT AND PLAN:     24 y.o.  at 31w1d by ultrasound at 19w3d with PPROM.      PPROM  - Hx of oligo since first formal US on  and again on , JIM 4.2 cm. Amniosure positive at OSH prior to transfer. Speculum exam neg x3 on admission to [facility]. Actim Prom negative on . Given high suspicion for PRROM, planning to continue management as such, patient now reports continued leakage of fluid  - S/p latency antibiotics completed   - S/p BMZ -, rBMZ 3/16-  - S/p NICU consult   - S/p genetic counseling due to early oligohydramnios - MaterniT Genome screening performed and negative for tested conditions  - Planning to continue inpatient management until 34.0 wga unless delivery indicated prior for fetal or maternal compromise    Fetal status  Fetal Growth Restriction  - NST reactive this AM  - continue daily NSTs while admitted   - Last growth Ultrasound:  EFW 1326g (6%), AC 6%, normal dopplers   - breech , weekly BPPs     Routine   - GBS negative, 2/10. Recollected 3/16, pos.  - BCM: POPs   - 1 hr gtt 99   - TDAP 2/28/25     Pt to be seen and discussed with [facility] Attending. Dr. Olivares.  Alyse Easley MD " PGY-3    Principal Problem:     premature rupture of membranes (PPROM) with unknown onset of labor ([facility])  Active Problems:    Oligohydramnios antepartum, second trimester, not applicable or unspecified fetus ([facility])    30 weeks gestation of pregnancy ([facility])    Poor fetal growth affecting management of mother in second trimester ([facility])

## 2025-04-06 VITALS
DIASTOLIC BLOOD PRESSURE: 68 MMHG | HEIGHT: 68 IN | BODY MASS INDEX: 30.52 KG/M2 | OXYGEN SATURATION: 97 % | TEMPERATURE: 98.2 F | SYSTOLIC BLOOD PRESSURE: 98 MMHG | RESPIRATION RATE: 18 BRPM | WEIGHT: 201.39 LBS | HEART RATE: 87 BPM

## 2025-04-06 PROCEDURE — 2500000001 HC RX 250 WO HCPCS SELF ADMINISTERED DRUGS (ALT 637 FOR MEDICARE OP): Performed by: STUDENT IN AN ORGANIZED HEALTH CARE EDUCATION/TRAINING PROGRAM

## 2025-04-06 PROCEDURE — 99232 SBSQ HOSP IP/OBS MODERATE 35: CPT

## 2025-04-06 PROCEDURE — 59025 FETAL NON-STRESS TEST: CPT

## 2025-04-06 PROCEDURE — 1220000001 HC OB SEMI-PRIVATE ROOM DAILY

## 2025-04-06 PROCEDURE — 59025 FETAL NON-STRESS TEST: CPT | Mod: GC

## 2025-04-06 PROCEDURE — 2500000001 HC RX 250 WO HCPCS SELF ADMINISTERED DRUGS (ALT 637 FOR MEDICARE OP)

## 2025-04-06 RX ADMIN — PRENATAL VIT W/ FE FUMARATE-FA TAB 27-0.8 MG 1 TABLET: 27-0.8 TAB at 08:34

## 2025-04-06 RX ADMIN — FAMOTIDINE 20 MG: 20 TABLET ORAL at 08:34

## 2025-04-06 RX ADMIN — FAMOTIDINE 20 MG: 20 TABLET ORAL at 19:58

## 2025-04-06 ASSESSMENT — PAIN SCALES - GENERAL
PAINLEVEL_OUTOF10: 0 - NO PAIN

## 2025-04-06 ASSESSMENT — PAIN - FUNCTIONAL ASSESSMENT
PAIN_FUNCTIONAL_ASSESSMENT: 0-10

## 2025-04-06 NOTE — CARE PLAN
The patient's goals for the shift include rest    The clinical goals for the shift include no s/sx of infection    Pt is doing well throughout the night and vss. Pt denies of bleeding or contractions. Pt is leaking small to moderate amount of non foul fluid . Pt endorses good fetal movements. Pt is resting comfortably and has no other complaints at this time.

## 2025-04-07 PROCEDURE — 1220000001 HC OB SEMI-PRIVATE ROOM DAILY

## 2025-04-07 PROCEDURE — 2500000001 HC RX 250 WO HCPCS SELF ADMINISTERED DRUGS (ALT 637 FOR MEDICARE OP): Performed by: STUDENT IN AN ORGANIZED HEALTH CARE EDUCATION/TRAINING PROGRAM

## 2025-04-07 PROCEDURE — 59025 FETAL NON-STRESS TEST: CPT

## 2025-04-07 PROCEDURE — 2500000001 HC RX 250 WO HCPCS SELF ADMINISTERED DRUGS (ALT 637 FOR MEDICARE OP)

## 2025-04-07 PROCEDURE — 59025 FETAL NON-STRESS TEST: CPT | Mod: GC

## 2025-04-07 PROCEDURE — 99232 SBSQ HOSP IP/OBS MODERATE 35: CPT

## 2025-04-07 RX ADMIN — PRENATAL VIT W/ FE FUMARATE-FA TAB 27-0.8 MG 1 TABLET: 27-0.8 TAB at 08:38

## 2025-04-07 RX ADMIN — FAMOTIDINE 20 MG: 20 TABLET ORAL at 20:05

## 2025-04-07 RX ADMIN — FAMOTIDINE 20 MG: 20 TABLET ORAL at 08:38

## 2025-04-07 ASSESSMENT — PAIN - FUNCTIONAL ASSESSMENT
PAIN_FUNCTIONAL_ASSESSMENT: 0-10

## 2025-04-07 ASSESSMENT — PAIN SCALES - GENERAL
PAINLEVEL_OUTOF10: 0 - NO PAIN

## 2025-04-07 NOTE — PROGRESS NOTES
"ANTEPARTUM PROGRESS NOTE   2025, 7:52 AM     SUBJECTIVE: No acute events overnight. Overall feeling well. Denies painful contractions, VB. Denies changes in vaginal discharge. Reports normal fetal movement.    OBJECTIVE:   /67   Pulse 80   Temp 36.6 °C (97.9 °F) (Temporal)   Resp 18   Ht 1.727 m (5' 8\")   Wt 91.3 kg (201 lb 6.2 oz)   LMP 2024   SpO2 96%   BMI 30.62 kg/m²    Temp  Min: 36.6 °C (97.9 °F)  Max: 36.8 °C (98.2 °F)  Pulse  Min: 80  Max: 87  BP  Min: 98/68  Max: 124/67    General: No acute distress  Cardiovascular: Warm and well perfused  Respiratory: Normal respiratory effort  Abdominal:  Soft, gravid, non-tender, no rebound or guarding  Extremities: Warm, well perfused    NST: Baseline 140 / mod variability / pos accels / neg decels  [place]: acontractile       ASSESSMENT AND PLAN:     24 y.o.  at 31w3d by ultrasound at 19w3d with PPROM.      PPROM  - Hx of oligo since first formal US on  and again on , JIM 4.2 cm. Amniosure positive at OSH prior to transfer. Speculum exam neg x3 on admission to [facility]. Actim Prom negative on . Given high suspicion for PRROM, planning to continue management as such, patient now reports continued leakage of fluid  - S/p latency antibiotics completed   - S/p BMZ -, rBMZ 3/16-  - S/p NICU consult   - S/p genetic counseling due to early oligohydramnios - MaterniT Genome screening performed and negative for tested conditions  - Planning to continue inpatient management until 34.0 wga unless delivery indicated prior for fetal or maternal compromise    Fetal status  Fetal Growth Restriction  - NST reactive this AM  - continue daily NSTs while admitted   - Last growth Ultrasound:  EFW 1326g (6%), AC 6%, normal dopplers   - breech , weekly BPPs     Routine   - GBS negative, 2/10. Recollected 3/16, pos.  - BCM: POPs   - 1 hr gtt 99   - TDAP 25     Pt to be seen and discussed with [facility] Attending. Dr. Olivares.  Alyse HUTTON" MD [facility] PGY-3    Principal Problem:     premature rupture of membranes (PPROM) with unknown onset of labor ([facility])  Active Problems:    Oligohydramnios antepartum, second trimester, not applicable or unspecified fetus ([facility])    30 weeks gestation of pregnancy ([facility])    Poor fetal growth affecting management of mother in second trimester ([facility])

## 2025-04-07 NOTE — CARE PLAN
The patient's goals for the shift include vitals and assessments WNL    The clinical goals for the shift include vitals and assessments WNL    Over the shift, the patient met the above goals.       Problem: Antepartum  Goal: Avoid/minimize constipation  Outcome: Progressing  Goal: No decrease in circulation/VTE  Outcome: Progressing  Goal: Minimize anxiety/maximize coping  Outcome: Progressing  Goal: Maintain pregnancy as long as maternal and/or fetal condition is stable  Outcome: Progressing     Problem:  Labor/Prolonged Premature Rupture of Membranes  Goal: No s/sx of IAI  Outcome: Progressing  Goal: Fewer then 4-6 ct per hour  Outcome: Progressing

## 2025-04-07 NOTE — PROGRESS NOTES
"ANTEPARTUM PROGRESS NOTE   2025, 7:14 AM     SUBJECTIVE: No acute events overnight. Denies painful contractions, VB. Denies changes in vaginal discharge. Reports normal fetal movement. No concerns this AM.    OBJECTIVE:   /76   Pulse 80   Temp 36.7 °C (98.1 °F) (Temporal)   Resp 18   Ht 1.727 m (5' 8\")   Wt 91.3 kg (201 lb 6.2 oz)   LMP 2024   SpO2 96%   BMI 30.62 kg/m²    Temp  Min: 36.6 °C (97.9 °F)  Max: 36.8 °C (98.2 °F)  Pulse  Min: 80  Max: 87  BP  Min: 98/68  Max: 124/67    General: No acute distress  Cardiovascular: Warm and well perfused  Respiratory: Normal respiratory effort  Abdominal:  Soft, gravid, non-tender, no rebound or guarding  Extremities: Warm, well perfused    NST: Baseline 130 / mod variability / pos accels / neg decels  [place]: acontractile       ASSESSMENT AND PLAN:     24 y.o.  at 31w2d by ultrasound at 19w3d with PPROM.      PPROM  - Hx of oligo since first formal US on  and again on , JIM 4.2 cm. Amniosure positive at OSH prior to transfer. Speculum exam neg x3 on admission to [facility]. Actim Prom negative on . Given high suspicion for PRROM, planning to continue management as such, patient now reports continued leakage of fluid  - S/p latency antibiotics completed   - S/p BMZ -, rBMZ 3/16-  - S/p NICU consult   - S/p genetic counseling due to early oligohydramnios - MaterniT Genome screening performed and negative for tested conditions  - Planning to continue inpatient management until 34.0 wga unless delivery indicated prior for fetal or maternal compromise    Fetal status  Fetal Growth Restriction  - NST reactive this AM  - continue daily NSTs while admitted   - Last growth Ultrasound:  EFW 1326g (6%), AC 6%, normal dopplers   - breech , weekly BPPs     Routine   - GBS negative, 2/10. Recollected 3/16, pos.  - BCM: POPs   - 1 hr gtt 99   - TDAP 25     Pt to be seen and discussed with [facility] Attending. Dr. Olivares.  Alyse HUTTON" MD [place] PGY-3    Principal Problem:     premature rupture of membranes (PPROM) with unknown onset of labor ([facility])  Active Problems:    Oligohydramnios antepartum, second trimester, not applicable or unspecified fetus ([facility])    30 weeks gestation of pregnancy ([facility])    Poor fetal growth affecting management of mother in second trimester ([facility])

## 2025-04-08 ENCOUNTER — APPOINTMENT (OUTPATIENT)
Dept: RADIOLOGY | Facility: HOSPITAL | Age: 25
End: 2025-04-08
Payer: COMMERCIAL

## 2025-04-08 LAB
ABO GROUP (TYPE) IN BLOOD: NORMAL
ANTIBODY SCREEN: NORMAL
RH FACTOR (ANTIGEN D): NORMAL

## 2025-04-08 PROCEDURE — 76819 FETAL BIOPHYS PROFIL W/O NST: CPT

## 2025-04-08 PROCEDURE — 76820 UMBILICAL ARTERY ECHO: CPT | Performed by: OBSTETRICS & GYNECOLOGY

## 2025-04-08 PROCEDURE — 59025 FETAL NON-STRESS TEST: CPT

## 2025-04-08 PROCEDURE — 86901 BLOOD TYPING SEROLOGIC RH(D): CPT

## 2025-04-08 PROCEDURE — 76815 OB US LIMITED FETUS(S): CPT | Performed by: OBSTETRICS & GYNECOLOGY

## 2025-04-08 PROCEDURE — 76819 FETAL BIOPHYS PROFIL W/O NST: CPT | Performed by: OBSTETRICS & GYNECOLOGY

## 2025-04-08 PROCEDURE — 2500000001 HC RX 250 WO HCPCS SELF ADMINISTERED DRUGS (ALT 637 FOR MEDICARE OP)

## 2025-04-08 PROCEDURE — 59025 FETAL NON-STRESS TEST: CPT | Mod: GC

## 2025-04-08 PROCEDURE — 1220000001 HC OB SEMI-PRIVATE ROOM DAILY

## 2025-04-08 PROCEDURE — 99232 SBSQ HOSP IP/OBS MODERATE 35: CPT

## 2025-04-08 PROCEDURE — 2500000001 HC RX 250 WO HCPCS SELF ADMINISTERED DRUGS (ALT 637 FOR MEDICARE OP): Performed by: STUDENT IN AN ORGANIZED HEALTH CARE EDUCATION/TRAINING PROGRAM

## 2025-04-08 PROCEDURE — 76815 OB US LIMITED FETUS(S): CPT

## 2025-04-08 PROCEDURE — 36415 COLL VENOUS BLD VENIPUNCTURE: CPT

## 2025-04-08 RX ADMIN — FAMOTIDINE 20 MG: 20 TABLET ORAL at 09:43

## 2025-04-08 RX ADMIN — FAMOTIDINE 20 MG: 20 TABLET ORAL at 20:34

## 2025-04-08 RX ADMIN — PRENATAL VIT W/ FE FUMARATE-FA TAB 27-0.8 MG 1 TABLET: 27-0.8 TAB at 09:43

## 2025-04-08 ASSESSMENT — PAIN SCALES - GENERAL
PAINLEVEL_OUTOF10: 0 - NO PAIN

## 2025-04-08 ASSESSMENT — PAIN - FUNCTIONAL ASSESSMENT
PAIN_FUNCTIONAL_ASSESSMENT: 0-10

## 2025-04-08 NOTE — CARE PLAN
The patient's goals for the shift include stable vital signs    The clinical goals for the shift include remain afebrile with a normal wbc ct    Over the shift, the patient did  make progress toward the following goals. Barriers to progression include none. Recommendations to address these barriers include continuing type and screens as well as cbc when ordered.Also watching for signs of infection.

## 2025-04-08 NOTE — PROGRESS NOTES
"ANTEPARTUM PROGRESS NOTE   2025, 10:18 AM     SUBJECTIVE: No acute events overnight. Overall feeling well and has no concerns this AM. Denies painful contractions, VB. Denies changes in vaginal discharge. Reports normal fetal movement.    OBJECTIVE:   /83 (BP Location: Right arm, Patient Position: Lying)   Pulse 102   Temp 36.2 °C (97.2 °F) (Temporal)   Resp 18   Ht 1.727 m (5' 8\")   Wt 91.3 kg (201 lb 6.2 oz)   LMP 2024   SpO2 97%   BMI 30.62 kg/m²    Temp  Min: 36.2 °C (97.2 °F)  Max: 37.1 °C (98.8 °F)  Pulse  Min: 79  Max: 102  BP  Min: 113/77  Max: 118/83    General: No acute distress  Cardiovascular: Warm and well perfused  Respiratory: Normal respiratory effort  Abdominal:  Soft, gravid, non-tender, no rebound or guarding  Extremities: Warm, well perfused    NST: Baseline 130 / mod variability / pos accels / neg decels  [place]: acontractile       ASSESSMENT AND PLAN:     24 y.o.  at 31w4d by ultrasound at 19w3d with PPROM.      PPROM  - Hx of oligo since first formal US on  and again on , JIM 4.2 cm. Amniosure positive at OSH prior to transfer. Speculum exam neg x3 on admission to [facility]. Actim Prom negative on . Given high suspicion for PRROM, planning to continue management as such, patient now reports continued leakage of fluid  - S/p latency antibiotics completed   - S/p BMZ -, rBMZ 3/16-  - S/p NICU consult   - S/p genetic counseling due to early oligohydramnios - MaterniT Genome screening performed and negative for tested conditions  - Planning to continue inpatient management until 34.0 wga unless delivery indicated prior for fetal or maternal compromise    Fetal status  Fetal Growth Restriction  - NST reactive this AM  - continue daily NSTs while admitted   - Last growth Ultrasound:  EFW 1326g (6%), AC 6%, normal dopplers   - breech , weekly BPPs, pending this AM     Routine   - GBS negative, 2/10. Recollected 3/16, pos.  - BCM: POPs   - 1 hr gtt " 99   - TDAP 25     Pt to be seen and discussed with [facility] Attending. Dr. Olivares.  Alyse Easley MD PGY-3    Principal Problem:     premature rupture of membranes (PPROM) with unknown onset of labor ([facility])  Active Problems:    Oligohydramnios antepartum, second trimester, not applicable or unspecified fetus ([facility])    30 weeks gestation of pregnancy ([facility])    Poor fetal growth affecting management of mother in second trimester ([facility])

## 2025-04-09 PROCEDURE — 99232 SBSQ HOSP IP/OBS MODERATE 35: CPT

## 2025-04-09 PROCEDURE — 1220000001 HC OB SEMI-PRIVATE ROOM DAILY

## 2025-04-09 PROCEDURE — 59025 FETAL NON-STRESS TEST: CPT

## 2025-04-09 PROCEDURE — 2500000001 HC RX 250 WO HCPCS SELF ADMINISTERED DRUGS (ALT 637 FOR MEDICARE OP): Performed by: STUDENT IN AN ORGANIZED HEALTH CARE EDUCATION/TRAINING PROGRAM

## 2025-04-09 PROCEDURE — 2500000001 HC RX 250 WO HCPCS SELF ADMINISTERED DRUGS (ALT 637 FOR MEDICARE OP)

## 2025-04-09 PROCEDURE — 59025 FETAL NON-STRESS TEST: CPT | Mod: GC

## 2025-04-09 RX ADMIN — FAMOTIDINE 20 MG: 20 TABLET ORAL at 20:17

## 2025-04-09 RX ADMIN — FAMOTIDINE 20 MG: 20 TABLET ORAL at 08:11

## 2025-04-09 RX ADMIN — PRENATAL VIT W/ FE FUMARATE-FA TAB 27-0.8 MG 1 TABLET: 27-0.8 TAB at 08:11

## 2025-04-09 RX ADMIN — CALCIUM CARBONATE (ANTACID) CHEW TAB 500 MG 500 MG: 500 CHEW TAB at 14:53

## 2025-04-09 ASSESSMENT — PAIN SCALES - GENERAL
PAINLEVEL_OUTOF10: 0 - NO PAIN

## 2025-04-09 NOTE — CARE PLAN
The patient's goals for the shift include Get some sleep uninterrupted    The clinical goals for the shift include Pt will not develop s/sx of infection/labor and fhr will remain reassuring    Pt remains afebrile without s/sx of infection. Denies s/sx of labor. FHR has remained reassuring with FM present. Tums given this afternoon for c/o heartburn and pt is getting pepcid bid. Plan for delivery at 34 weeks if remains stable.

## 2025-04-09 NOTE — PROGRESS NOTES
"Tatianna Mojica is a 24 y.o. female on day 63 of admission presenting with  premature rupture of membranes (PPROM) with unknown onset of labor ([facility]).    SWer met with Ms. Mojica at bedside. She reports that she is \"doing good\" and is looking forward to meeting baby girl. She got new ultrasound pictures and hung them up on her wall. Discussed long-term disability paperwork that she is getting through her work. Also discussed housing situation (trying to find new housing but has a safe space to go to when she delivers). Denies any current needs. Amenable to continued weekly check-ins and knows how to get ahold of this SWer if needed.    Social Work will continue to remain available for any questions or concerns. Please feel free to reach out.     2025   CHASE Marrero  OB/GYN   Office Phone: [phone]  Secure chat preferred   "

## 2025-04-09 NOTE — PROGRESS NOTES
"ANTEPARTUM PROGRESS NOTE   2025, 6:16 AM     SUBJECTIVE: Patient resting comfortably in bed this morning. Overall feeling well, reporting ongoing mild soreness in lower abdomen with occasional sharp contractions, not consistent. Pain is relieved with hot packs. Denies painful contractions, VB. Denies changes in vaginal discharge. Reports normal fetal movement.    OBJECTIVE:   /73   Pulse 98   Temp 36.4 °C (97.5 °F) (Temporal)   Resp 16   Ht 1.727 m (5' 8\")   Wt 91.3 kg (201 lb 6.2 oz)   LMP 2024   SpO2 96%   BMI 30.62 kg/m²    Temp  Min: 36.2 °C (97.2 °F)  Max: 36.4 °C (97.5 °F)  Pulse  Min: 80  Max: 102  BP  Min: 111/73  Max: 118/83    General: No acute distress  Cardiovascular: Warm and well perfused  Respiratory: Normal respiratory effort  Abdominal:  Soft, gravid, non-tender, no rebound or guarding  Extremities: Warm, well perfused    NST: Baseline 135 / mod variability / pos accels / neg decels  [place]: acontractile       ASSESSMENT AND PLAN:     24 y.o.  at 31w5d by ultrasound at 19w3d with PPROM.      PPROM  - Hx of oligo since first formal US on  and again on , JIM 4.2 cm. Amniosure positive at OSH prior to transfer. Speculum exam neg x3 on admission to [facility]. Actim Prom negative on . Given high suspicion for PRROM, planning to continue management as such, patient now reports continued leakage of fluid  - S/p latency antibiotics completed   - S/p BMZ -, rBMZ 3/16-  - S/p NICU consult   - S/p genetic counseling due to early oligohydramnios - MaterniT Genome screening performed and negative for tested conditions  - Planning to continue inpatient management until 34.0 wga unless delivery indicated prior for fetal or maternal compromise    Fetal status  Fetal Growth Restriction  - NST reactive this AM  - continue daily NSTs while admitted   - Last growth Ultrasound:  EFW 1326g (6%), AC 6%, normal dopplers   - breech , weekly BPPs     Routine   - GBS " negative, 2/10. Recollected 3/16, pos.  - BCM: POPs   - 1 hr gtt 99   - TDAP 25     Pt to be seen and discussed with MFM Attending, Dr. Olivares.  Juan Brooks MD PGY-1    Principal Problem:     premature rupture of membranes (PPROM) with unknown onset of labor ([facility]-HCC)  Active Problems:    Oligohydramnios antepartum, second trimester, not applicable or unspecified fetus ([facility]HS-HCC)    30 weeks gestation of pregnancy ([facility]HS-HCC)    Poor fetal growth affecting management of mother in second trimester ([facility]HS-HCC)

## 2025-04-09 NOTE — CARE PLAN
The patient's goals for the shift include rest; healthy baby    The clinical goals for the shift include no s/sx of infection or PTL    Over the shift, the patient did make progress toward the following goals. PT vital signs WNL throughout shift without s/sx of infection or PTL. Patient had one pad overnight with pale yellow fluid. No foul smell noted. FHR reassuring.      Problem: Antepartum  Goal: Maintain pregnancy as long as maternal and/or fetal condition is stable  2025 by Adelina Contreras RN  Outcome: Progressing  2025 by Adelina Contreras RN  Outcome: Progressing     Problem:  Labor/Prolonged Premature Rupture of Membranes  Goal: No s/sx of IAI  2025 by Adelina Contreras RN  Outcome: Progressing  2025 by Adelina Contreras RN  Outcome: Progressing     Problem:  Labor/Prolonged Premature Rupture of Membranes  Goal: Fewer then 4-6 ct per hour  2025 by Adelina Contreras RN  Outcome: Progressing  2025 by Adelina Contreras RN  Outcome: Progressing

## 2025-04-10 LAB
BASOPHILS # BLD AUTO: 0.04 X10*3/UL (ref 0–0.1)
BASOPHILS NFR BLD AUTO: 0.3 %
EOSINOPHIL # BLD AUTO: 0.21 X10*3/UL (ref 0–0.7)
EOSINOPHIL NFR BLD AUTO: 1.6 %
ERYTHROCYTE [DISTWIDTH] IN BLOOD BY AUTOMATED COUNT: 12.3 % (ref 11.5–14.5)
HCT VFR BLD AUTO: 34.3 % (ref 36–46)
HGB BLD-MCNC: 11.4 G/DL (ref 12–16)
IMM GRANULOCYTES # BLD AUTO: 0.41 X10*3/UL (ref 0–0.7)
IMM GRANULOCYTES NFR BLD AUTO: 3.2 % (ref 0–0.9)
LYMPHOCYTES # BLD AUTO: 2.17 X10*3/UL (ref 1.2–4.8)
LYMPHOCYTES NFR BLD AUTO: 17 %
MCH RBC QN AUTO: 30.8 PG (ref 26–34)
MCHC RBC AUTO-ENTMCNC: 33.2 G/DL (ref 32–36)
MCV RBC AUTO: 93 FL (ref 80–100)
MONOCYTES # BLD AUTO: 1.06 X10*3/UL (ref 0.1–1)
MONOCYTES NFR BLD AUTO: 8.3 %
NEUTROPHILS # BLD AUTO: 8.85 X10*3/UL (ref 1.2–7.7)
NEUTROPHILS NFR BLD AUTO: 69.6 %
NRBC BLD-RTO: 0 /100 WBCS (ref 0–0)
PLATELET # BLD AUTO: 199 X10*3/UL (ref 150–450)
RBC # BLD AUTO: 3.7 X10*6/UL (ref 4–5.2)
WBC # BLD AUTO: 12.7 X10*3/UL (ref 4.4–11.3)

## 2025-04-10 PROCEDURE — 59025 FETAL NON-STRESS TEST: CPT | Mod: GC

## 2025-04-10 PROCEDURE — 2500000001 HC RX 250 WO HCPCS SELF ADMINISTERED DRUGS (ALT 637 FOR MEDICARE OP)

## 2025-04-10 PROCEDURE — 2500000001 HC RX 250 WO HCPCS SELF ADMINISTERED DRUGS (ALT 637 FOR MEDICARE OP): Performed by: STUDENT IN AN ORGANIZED HEALTH CARE EDUCATION/TRAINING PROGRAM

## 2025-04-10 PROCEDURE — 85025 COMPLETE CBC W/AUTO DIFF WBC: CPT

## 2025-04-10 PROCEDURE — 59025 FETAL NON-STRESS TEST: CPT

## 2025-04-10 PROCEDURE — 1220000001 HC OB SEMI-PRIVATE ROOM DAILY

## 2025-04-10 PROCEDURE — 36415 COLL VENOUS BLD VENIPUNCTURE: CPT

## 2025-04-10 PROCEDURE — 99232 SBSQ HOSP IP/OBS MODERATE 35: CPT

## 2025-04-10 RX ORDER — CYCLOBENZAPRINE HCL 10 MG
10 TABLET ORAL ONCE
Status: COMPLETED | OUTPATIENT
Start: 2025-04-10 | End: 2025-04-10

## 2025-04-10 RX ADMIN — PRENATAL VIT W/ FE FUMARATE-FA TAB 27-0.8 MG 1 TABLET: 27-0.8 TAB at 08:56

## 2025-04-10 RX ADMIN — FAMOTIDINE 20 MG: 20 TABLET ORAL at 20:44

## 2025-04-10 RX ADMIN — FAMOTIDINE 20 MG: 20 TABLET ORAL at 08:56

## 2025-04-10 RX ADMIN — CYCLOBENZAPRINE 10 MG: 10 TABLET, FILM COATED ORAL at 06:49

## 2025-04-10 ASSESSMENT — PAIN SCALES - GENERAL
PAINLEVEL_OUTOF10: 3
PAINLEVEL_OUTOF10: 2
PAINLEVEL_OUTOF10: 2

## 2025-04-10 ASSESSMENT — PAIN - FUNCTIONAL ASSESSMENT
PAIN_FUNCTIONAL_ASSESSMENT: 0-10

## 2025-04-10 NOTE — PROGRESS NOTES
"ANTEPARTUM PROGRESS NOTE   4/10/2025, 6:34 AM     SUBJECTIVE: Patient resting comfortably in bed this morning. Reports intermittent discomfort in the left lower abdomen. Denies painful contractions, VB. Denies changes in vaginal discharge. Reports normal fetal movement.    OBJECTIVE:   /69   Pulse 89   Temp 37.1 °C (98.8 °F) (Temporal)   Resp 18   Ht 1.727 m (5' 8\")   Wt 91.3 kg (201 lb 6.2 oz)   LMP 2024   SpO2 97%   BMI 30.62 kg/m²    Temp  Min: 36.5 °C (97.7 °F)  Max: 37.2 °C (99 °F)  Pulse  Min: 81  Max: 92  BP  Min: 108/75  Max: 122/69    General: No acute distress  Cardiovascular: Warm and well perfused  Respiratory: Normal respiratory effort  Abdominal:  Soft, gravid, moderately tender to palpation in the LLQ on palpation   Extremities: Warm, well perfused    NST: Baseline 135 / mod variability / pos accels / neg decels  [place]: acontractile       ASSESSMENT AND PLAN:     24 y.o.  at 31w6d by ultrasound at 19w3d with PPROM.      PPROM  - Hx of oligo since first formal US on  and again on , JIM 4.2 cm. Amniosure positive at OSH prior to transfer. Speculum exam neg x3 on admission to [facility]. Actim Prom negative on . Given high suspicion for PRROM, planning to continue management as such, patient now reports continued leakage of fluid  - S/p latency antibiotics completed   - S/p BMZ -, rBMZ 3/16-  - S/p NICU consult   - S/p genetic counseling due to early oligohydramnios - MaterniT Genome screening performed and negative for tested conditions  - Patient with new abdominal tenderness on exam this AM, CBC with diff pending, will give Flexeril and follow-up symptoms  - Planning to continue inpatient management until 34.0 wga unless delivery indicated prior for fetal or maternal compromise    Fetal status  Fetal Growth Restriction  - NST reactive this AM  - continue daily NSTs while admitted   - Last growth Ultrasound:  EFW 1326g (6%), AC 6%, normal dopplers   - " breech , weekly BPPs     Routine   - GBS negative, 2/10. Recollected 3/16, pos.  - BCM: POPs   - 1 hr gtt 99   - TDAP 25     Pt to be seen and discussed with MFM Attending, Dr. Olivares.  Alyse Easley MD PGY-3    Principal Problem:     premature rupture of membranes (PPROM) with unknown onset of labor ([facility]-HCC)  Active Problems:    Oligohydramnios antepartum, second trimester, not applicable or unspecified fetus (HHS-HCC)    30 weeks gestation of pregnancy (HHS-HCC)    Poor fetal growth affecting management of mother in second trimester (HHS-HCC)

## 2025-04-11 LAB
ABO GROUP (TYPE) IN BLOOD: NORMAL
ANTIBODY SCREEN: NORMAL
RH FACTOR (ANTIGEN D): NORMAL

## 2025-04-11 PROCEDURE — 36415 COLL VENOUS BLD VENIPUNCTURE: CPT

## 2025-04-11 PROCEDURE — 59025 FETAL NON-STRESS TEST: CPT | Mod: GC

## 2025-04-11 PROCEDURE — 2500000001 HC RX 250 WO HCPCS SELF ADMINISTERED DRUGS (ALT 637 FOR MEDICARE OP)

## 2025-04-11 PROCEDURE — 86901 BLOOD TYPING SEROLOGIC RH(D): CPT

## 2025-04-11 PROCEDURE — 1220000001 HC OB SEMI-PRIVATE ROOM DAILY

## 2025-04-11 PROCEDURE — 2500000001 HC RX 250 WO HCPCS SELF ADMINISTERED DRUGS (ALT 637 FOR MEDICARE OP): Performed by: STUDENT IN AN ORGANIZED HEALTH CARE EDUCATION/TRAINING PROGRAM

## 2025-04-11 RX ADMIN — PRENATAL VIT W/ FE FUMARATE-FA TAB 27-0.8 MG 1 TABLET: 27-0.8 TAB at 08:15

## 2025-04-11 RX ADMIN — FAMOTIDINE 20 MG: 20 TABLET ORAL at 20:20

## 2025-04-11 RX ADMIN — FAMOTIDINE 20 MG: 20 TABLET ORAL at 08:15

## 2025-04-11 ASSESSMENT — PAIN SCALES - GENERAL
PAINLEVEL_OUTOF10: 0 - NO PAIN
PAINLEVEL_OUTOF10: 0 - NO PAIN

## 2025-04-11 ASSESSMENT — PAIN - FUNCTIONAL ASSESSMENT
PAIN_FUNCTIONAL_ASSESSMENT: 0-10

## 2025-04-11 NOTE — PROGRESS NOTES
"ANTEPARTUM PROGRESS NOTE   2025, 7:41 AM     SUBJECTIVE: Reports intermittent discomfort in the left lower abdomen after sneeze overnight that is improving. Denies painful contractions, VB. Denies changes in vaginal discharge. Reports normal fetal movement.    OBJECTIVE:   /73   Pulse 68   Temp 36.4 °C (97.5 °F) (Temporal)   Resp 18   Ht 1.727 m (5' 8\")   Wt 91.3 kg (201 lb 6.2 oz)   LMP 2024   SpO2 98%   BMI 30.62 kg/m²    Temp  Min: 36.4 °C (97.5 °F)  Max: 36.7 °C (98.1 °F)  Pulse  Min: 68  Max: 99  BP  Min: 114/73  Max: 131/87    General: No acute distress  Cardiovascular: Warm and well perfused  Respiratory: Normal respiratory effort  Abdominal:  Soft, gravid, nontender to palpation  Extremities: Warm, well perfused    NST: Baseline 135 / mod variability / pos accels / neg decels  [place]: acontractile       ASSESSMENT AND PLAN:     24 y.o.  at 32w0d by ultrasound at 19w3d with PPROM.      PPROM  - Hx of oligo since first formal US on  and again on , JIM 4.2 cm. Amniosure positive at OSH prior to transfer. Speculum exam neg x3 on admission to [facility]. Actim Prom negative on . Given high suspicion for PRROM, planning to continue management as such, patient now reports continued leakage of fluid  - S/p latency antibiotics completed   - S/p BMZ -, rBMZ 3/16-  - S/p NICU consult   - S/p genetic counseling due to early oligohydramnios - MaterniT Genome screening performed and negative for tested conditions  - Patient with abdominal tenderness in RLQ yesterday now resolved, WBC decreased from prior on 4/10 labs, low suspicion for IAI and will continue to closely monitor symptoms  - Planning to continue inpatient management until 34.0 wga unless delivery indicated prior for fetal or maternal compromise    Fetal status  Fetal Growth Restriction  - NST reactive this AM  - continue daily NSTs while admitted   - Last growth Ultrasound:  EFW 1326g (6%), AC 6%, normal " dopplers   - breech , weekly BPPs     Routine   - GBS negative, 2/10. Recollected 3/16, pos.  - BCM: POPs   - 1 hr gtt 99   - TDAP 25     Pt to be seen and discussed with MFM Attending, Dr. Tricia Easley MD PGY-3    Principal Problem:     premature rupture of membranes (PPROM) with unknown onset of labor ([facility]HS-HCC)  Active Problems:    Oligohydramnios antepartum, second trimester, not applicable or unspecified fetus (HHS-HCC)    30 weeks gestation of pregnancy (HHS-HCC)    Poor fetal growth affecting management of mother in second trimester (HHS-HCC)

## 2025-04-11 NOTE — CARE PLAN
The patient's goals for the shift include sleep    The clinical goals for the shift include no PTL, no infection    VS and assessments stable throughout shift.

## 2025-04-11 NOTE — CARE PLAN
The patient's goals for the shift include to get some rest    The clinical goals for the shift include no s/sx of infection    Over the shift, the patient did not make progress toward the following goals. Barriers to progression include none. Recommendations to address these barriers include none.

## 2025-04-11 NOTE — CARE PLAN
The patient's goals for the shift include to get some rest    The clinical goals for the shift include no s/sx of infection

## 2025-04-12 PROCEDURE — 1220000001 HC OB SEMI-PRIVATE ROOM DAILY

## 2025-04-12 PROCEDURE — 2500000001 HC RX 250 WO HCPCS SELF ADMINISTERED DRUGS (ALT 637 FOR MEDICARE OP): Performed by: STUDENT IN AN ORGANIZED HEALTH CARE EDUCATION/TRAINING PROGRAM

## 2025-04-12 PROCEDURE — 59025 FETAL NON-STRESS TEST: CPT | Mod: GC

## 2025-04-12 PROCEDURE — 2500000001 HC RX 250 WO HCPCS SELF ADMINISTERED DRUGS (ALT 637 FOR MEDICARE OP)

## 2025-04-12 RX ADMIN — PRENATAL VIT W/ FE FUMARATE-FA TAB 27-0.8 MG 1 TABLET: 27-0.8 TAB at 09:56

## 2025-04-12 RX ADMIN — FAMOTIDINE 20 MG: 20 TABLET ORAL at 09:56

## 2025-04-12 RX ADMIN — FAMOTIDINE 20 MG: 20 TABLET ORAL at 19:45

## 2025-04-12 ASSESSMENT — PAIN - FUNCTIONAL ASSESSMENT
PAIN_FUNCTIONAL_ASSESSMENT: 0-10

## 2025-04-12 ASSESSMENT — PAIN SCALES - GENERAL
PAINLEVEL_OUTOF10: 0 - NO PAIN

## 2025-04-12 NOTE — CARE PLAN
The patient's goals for the shift include sleep    The clinical goals for the shift include no PTL, no infection    Over the shift, the patient did not make progress toward the following goals. Barriers to progression include none. Recommendations to address these barriers include none.

## 2025-04-12 NOTE — PROGRESS NOTES
"ANTEPARTUM PROGRESS NOTE   2025, 4:30 AM     SUBJECTIVE: Reports left lower abdomen pain is improved from yesterday. Denies fevers or chills. Denies painful contractions, VB. Denies changes in vaginal discharge. Reports normal fetal movement.    OBJECTIVE:   /79   Pulse 91   Temp 36 °C (96.8 °F) (Temporal)   Resp 18   Ht 1.727 m (5' 8\")   Wt 91.3 kg (201 lb 6.2 oz)   LMP 2024   SpO2 96%   BMI 30.62 kg/m²    Temp  Min: 36 °C (96.8 °F)  Max: 37.4 °C (99.3 °F)  Pulse  Min: 90  Max: 95  BP  Min: 109/71  Max: 127/79    General: No acute distress  Cardiovascular: Warm and well perfused  Respiratory: Normal respiratory effort  Abdominal:  Soft, gravid, nontender to palpation  Extremities: Warm, well perfused    NST: Baseline 130 / mod variability / pos accels / neg decels  [place]: acontractile       ASSESSMENT AND PLAN:     24 y.o.  at 32w1d by ultrasound at 19w3d with PPROM.      PPROM  - Hx of oligo since first formal US on  and again on , JIM 4.2 cm. Amniosure positive at OSH prior to transfer. Speculum exam neg x3 on admission to [facility]. Actim Prom negative on . Given high suspicion for PRROM, planning to continue management as such, patient now reports continued leakage of fluid  - S/p latency antibiotics completed   - S/p BMZ -, rBMZ 3/16-  - S/p NICU consult   - S/p genetic counseling due to early oligohydramnios - MaterniT Genome screening performed and negative for tested conditions  - Patient previously with abdominal tenderness in lower quadrant, now improved. WBC (10) decreased from prior, low suspicion for IAI and will continue to closely monitor symptoms  - Planning to continue inpatient management until 34.0 wga unless delivery indicated prior for fetal or maternal compromise    Fetal status  Fetal Growth Restriction  - NST reactive this AM  - continue daily NSTs while admitted   - Last growth Ultrasound:  EFW 1326g (6%), AC 6%, normal dopplers   - " breech , weekly BPPs     Routine   - GBS negative, 2/10. Recollected 3/16, pos.  - BCM: POPs   - 1 hr gtt 99   - TDAP 25     Discussed with and to be seen by MFM Attending, Dr. Tricia Acosta MD PGY-1  Maternal Fetal Medicine, Pager [phone]    Principal Problem:     premature rupture of membranes (PPROM) with unknown onset of labor ([facility])  Active Problems:    Oligohydramnios antepartum, second trimester, not applicable or unspecified fetus (HHS-HCC)    30 weeks gestation of pregnancy (HHS-HCC)    Poor fetal growth affecting management of mother in second trimester (HHS-HCC)

## 2025-04-12 NOTE — CARE PLAN
The patient's goals for the shift include sleep    The clinical goals for the shift include no PTL/infection    Over the shift, the patient continued to make progress toward the following goals. Patient remained free from falls/injury throughout shift. VSS and no s/sx of infection at this time. Patient denies n/v, fever, or chills. FHR remained reassuring with spot check throughout shift. No changes in amniotic fluid throughout shift, and patient declines abd tenderness, ctx or cramping, and vaginal bleeding. Patient resting comfortably in bed and denies needs at this time.

## 2025-04-12 NOTE — CARE PLAN
The patient's goals for the shift include sleep    The clinical goals for the shift include no PTL, no infection    Over the shift, the patient did not make progress toward the following goals. Barriers to progression include ***. Recommendations to address these barriers include ***.

## 2025-04-13 PROCEDURE — 1220000001 HC OB SEMI-PRIVATE ROOM DAILY

## 2025-04-13 PROCEDURE — 2500000001 HC RX 250 WO HCPCS SELF ADMINISTERED DRUGS (ALT 637 FOR MEDICARE OP): Performed by: STUDENT IN AN ORGANIZED HEALTH CARE EDUCATION/TRAINING PROGRAM

## 2025-04-13 PROCEDURE — 2500000001 HC RX 250 WO HCPCS SELF ADMINISTERED DRUGS (ALT 637 FOR MEDICARE OP)

## 2025-04-13 PROCEDURE — 59025 FETAL NON-STRESS TEST: CPT | Mod: GC

## 2025-04-13 RX ADMIN — PRENATAL VIT W/ FE FUMARATE-FA TAB 27-0.8 MG 1 TABLET: 27-0.8 TAB at 09:02

## 2025-04-13 RX ADMIN — FAMOTIDINE 20 MG: 20 TABLET ORAL at 19:59

## 2025-04-13 RX ADMIN — ACETAMINOPHEN 650 MG: 325 TABLET, FILM COATED ORAL at 05:34

## 2025-04-13 RX ADMIN — FAMOTIDINE 20 MG: 20 TABLET ORAL at 09:02

## 2025-04-13 ASSESSMENT — PAIN - FUNCTIONAL ASSESSMENT
PAIN_FUNCTIONAL_ASSESSMENT: 0-10

## 2025-04-13 ASSESSMENT — PAIN SCALES - GENERAL
PAINLEVEL_OUTOF10: 0 - NO PAIN

## 2025-04-13 NOTE — PROGRESS NOTES
"ANTEPARTUM PROGRESS NOTE     SUBJECTIVE: Pt reports intermittent lower abdominal tightness. She reports noticing it most based on how the baby is positioned. States it feels as if the baby is pushing up against the anterior portion of her belly. Otherwise denies fever or chills. Denies vb or change in discharge. Normal fetal movement.    OBJECTIVE:   /67 (BP Location: Left arm, Patient Position: Lying)   Pulse 76   Temp 36.4 °C (97.5 °F) (Temporal)   Resp 20   Ht 1.727 m (5' 8\")   Wt 91.3 kg (201 lb 6.2 oz)   LMP 2024   SpO2 99%   BMI 30.62 kg/m²    Temp  Min: 36.2 °C (97.2 °F)  Max: 36.5 °C (97.7 °F)  Pulse  Min: 70  Max: 109  BP  Min: 101/68  Max: 113/73    General: No acute distress  Cardiovascular: Warm and well perfused  Respiratory: Normal respiratory effort  Abdominal:  Soft, gravid, nontender to palpation  Extremities: Warm, well perfused    NST: Baseline 135 / mod variability / pos accels / neg decels  [place]: acontractile       ASSESSMENT AND PLAN:     24 y.o.  at 32w2d by ultrasound at 19w3d with PPROM.      PPROM  - Hx of oligo since first formal US on  and again on , JIM 4.2 cm. Amniosure positive at OSH prior to transfer. Speculum exam neg x3 on admission to [facility]. Actim Prom negative on . Given high suspicion for PRROM, planning to continue management as such, patient now reports continued leakage of fluid  - S/p latency antibiotics completed   - S/p BMZ -, rBMZ 3/16-  - S/p NICU consult   - S/p genetic counseling due to early oligohydramnios - MaterniT Genome screening performed and negative for tested conditions  - Patient with lower abdominal tightness overnight which she reports correlates with positioning of baby. No contractions on toco. Will cont to monitor  - Planning to continue inpatient management until 34.0 wga unless delivery indicated prior for fetal or maternal compromise    Fetal status  Fetal Growth Restriction  - NST reactive this AM  - " continue daily NSTs while admitted   - Last growth Ultrasound: 4/1 EFW 1326g (6%), AC 6%, normal dopplers   - breech 4/8, weekly BPPs     Routine   - GBS negative, 2/10. Recollected 3/16, pos.  - BCM: POPs   - 1 hr gtt 99   - TDAP 2/28/25     To be seen & d/w Dr. Tricia Tao MD PGY-1  Maternal Fetal Medicine, Pager [phone]

## 2025-04-13 NOTE — CARE PLAN
Problem: Antepartum  Goal: No decrease in circulation/VTE  Outcome: Progressing  Goal: Minimize anxiety/maximize coping  Outcome: Progressing  Goal: Maintain pregnancy as long as maternal and/or fetal condition is stable  Outcome: Progressing     Problem:  Labor/Prolonged Premature Rupture of Membranes  Goal: Fewer then 4-6 ct per hour  Outcome: Progressing  Goal: No s/sx of IAI  Outcome: Progressing     The clinical goals for the shift include no s/sx of infection    Over the shift, the patient did make progress toward the following goals.

## 2025-04-13 NOTE — CARE PLAN
The patient's goals for the shift include rest    The clinical goals for the shift include no PTL or s/sx of infection.    Over the shift, the patient made progress toward the following goals. Patient remained free from falls/injury throughout shift. VSS and no s/sx of infection at this time. Patient denies n/v, fever, or chills. FHR remained reassuring with spot check throughout shift. No changes in amniotic fluid throughout shift, and patient declines abd tenderness, ctx or cramping, and vaginal bleeding. Patient resting comfortably in bed and denies needs at this time.

## 2025-04-14 LAB
ABO GROUP (TYPE) IN BLOOD: NORMAL
ANTIBODY SCREEN: NORMAL
RH FACTOR (ANTIGEN D): NORMAL

## 2025-04-14 PROCEDURE — 36415 COLL VENOUS BLD VENIPUNCTURE: CPT

## 2025-04-14 PROCEDURE — 59025 FETAL NON-STRESS TEST: CPT | Mod: GC

## 2025-04-14 PROCEDURE — 1220000001 HC OB SEMI-PRIVATE ROOM DAILY

## 2025-04-14 PROCEDURE — 86901 BLOOD TYPING SEROLOGIC RH(D): CPT

## 2025-04-14 PROCEDURE — 2500000001 HC RX 250 WO HCPCS SELF ADMINISTERED DRUGS (ALT 637 FOR MEDICARE OP)

## 2025-04-14 PROCEDURE — 2500000001 HC RX 250 WO HCPCS SELF ADMINISTERED DRUGS (ALT 637 FOR MEDICARE OP): Performed by: STUDENT IN AN ORGANIZED HEALTH CARE EDUCATION/TRAINING PROGRAM

## 2025-04-14 RX ADMIN — FAMOTIDINE 20 MG: 20 TABLET ORAL at 20:28

## 2025-04-14 RX ADMIN — PRENATAL VIT W/ FE FUMARATE-FA TAB 27-0.8 MG 1 TABLET: 27-0.8 TAB at 08:31

## 2025-04-14 RX ADMIN — FAMOTIDINE 20 MG: 20 TABLET ORAL at 08:31

## 2025-04-14 ASSESSMENT — PAIN - FUNCTIONAL ASSESSMENT
PAIN_FUNCTIONAL_ASSESSMENT: 0-10

## 2025-04-14 ASSESSMENT — PAIN SCALES - GENERAL
PAINLEVEL_OUTOF10: 0 - NO PAIN

## 2025-04-14 NOTE — PROGRESS NOTES
"ANTEPARTUM PROGRESS NOTE     SUBJECTIVE: No acute events overnight. Denies any cramping or contractions. Denies fever or chills. Denies vb or change in discharge. Normal fetal movement. Reports abdominal soreness from baby's position.    OBJECTIVE:   /76   Pulse 101   Temp 36.6 °C (97.9 °F) (Temporal)   Resp 18   Ht 1.727 m (5' 8\")   Wt 91.3 kg (201 lb 6.2 oz)   LMP 2024   SpO2 97%   BMI 30.62 kg/m²    Temp  Min: 36 °C (96.8 °F)  Max: 36.6 °C (97.9 °F)  Pulse  Min: 86  Max: 101  BP  Min: 104/74  Max: 114/76    General: No acute distress  Cardiovascular: Warm and well perfused  Respiratory: Normal respiratory effort  Abdominal:  Soft, gravid, nontender to palpation  Extremities: Warm, well perfused    NST: Baseline 135 / mod variability / pos accels / neg decels  [place]: acontractile       ASSESSMENT AND PLAN:     24 y.o.  at 32w3d by ultrasound at 19w3d with PPROM.      PPROM  - Hx of oligo since first formal US on  and again on , JIM 4.2 cm. Amniosure positive at OSH prior to transfer. Speculum exam neg x3 on admission to [facility]. Actim Prom negative on . Given high suspicion for PRROM, planning to continue management as such, patient now reports continued leakage of fluid  - S/p latency antibiotics completed   - S/p BMZ -, rBMZ 3/16-  - S/p NICU consult   - S/p genetic counseling due to early oligohydramnios - MaterniT Genome screening performed and negative for tested conditions  - Planning to continue inpatient management until 34.0 wga unless delivery indicated prior for fetal or maternal compromise    Fetal status  Fetal Growth Restriction  - NST reactive this AM  - continue daily NSTs while admitted   - Last growth Ultrasound:  EFW 1326g (6%), AC 6%, normal dopplers   - breech , weekly BPPs     Routine   - GBS negative, 2/10. Recollected 3/16, pos.  - BCM: POPs   - 1 hr gtt 99   - TDAP 25     Seen & d/w Dr. Tricia Dykes MD PGY-2  Maternal " Fetal Medicine, Pager [phone]

## 2025-04-14 NOTE — CARE PLAN
The patient's goals for the shift include maintain pregnancy    The clinical goals for the shift include no s/sx of infection      Problem: Antepartum  Goal: Maintain pregnancy as long as maternal and/or fetal condition is stable  Outcome: Progressing     Problem:  Labor/Prolonged Premature Rupture of Membranes  Goal: No s/sx of IAI  Outcome: Progressing     Patient stable throughout shift. No s/sx of infection. VSS. FHR WNL.

## 2025-04-15 ENCOUNTER — APPOINTMENT (OUTPATIENT)
Dept: RADIOLOGY | Facility: HOSPITAL | Age: 25
End: 2025-04-15
Payer: COMMERCIAL

## 2025-04-15 PROCEDURE — 2500000001 HC RX 250 WO HCPCS SELF ADMINISTERED DRUGS (ALT 637 FOR MEDICARE OP): Performed by: STUDENT IN AN ORGANIZED HEALTH CARE EDUCATION/TRAINING PROGRAM

## 2025-04-15 PROCEDURE — 2500000001 HC RX 250 WO HCPCS SELF ADMINISTERED DRUGS (ALT 637 FOR MEDICARE OP)

## 2025-04-15 PROCEDURE — 1220000001 HC OB SEMI-PRIVATE ROOM DAILY

## 2025-04-15 PROCEDURE — 76815 OB US LIMITED FETUS(S): CPT

## 2025-04-15 PROCEDURE — 76820 UMBILICAL ARTERY ECHO: CPT

## 2025-04-15 PROCEDURE — 76819 FETAL BIOPHYS PROFIL W/O NST: CPT | Performed by: OBSTETRICS & GYNECOLOGY

## 2025-04-15 PROCEDURE — 76820 UMBILICAL ARTERY ECHO: CPT | Performed by: OBSTETRICS & GYNECOLOGY

## 2025-04-15 PROCEDURE — 76815 OB US LIMITED FETUS(S): CPT | Performed by: OBSTETRICS & GYNECOLOGY

## 2025-04-15 RX ADMIN — FAMOTIDINE 20 MG: 20 TABLET ORAL at 19:44

## 2025-04-15 RX ADMIN — PRENATAL VIT W/ FE FUMARATE-FA TAB 27-0.8 MG 1 TABLET: 27-0.8 TAB at 08:32

## 2025-04-15 RX ADMIN — FAMOTIDINE 20 MG: 20 TABLET ORAL at 08:32

## 2025-04-15 ASSESSMENT — PAIN SCALES - GENERAL
PAINLEVEL_OUTOF10: 0 - NO PAIN

## 2025-04-15 ASSESSMENT — PAIN - FUNCTIONAL ASSESSMENT
PAIN_FUNCTIONAL_ASSESSMENT: 0-10

## 2025-04-15 NOTE — CARE PLAN
The patient's goals for the shift include maintain pregnancy    The clinical goals for the shift include no s/sx of infection    Over the shift, the patient did not make progress toward the following goals. Barriers to progression include ***. Recommendations to address these barriers include ***.

## 2025-04-15 NOTE — PROGRESS NOTES
"ANTEPARTUM PROGRESS NOTE     SUBJECTIVE: No acute events overnight. Denies any cramping or contractions. Denies fever or chills. Denies vb or change in discharge. Normal fetal movement.    OBJECTIVE:   /77   Pulse 101   Temp 36.4 °C (97.5 °F) (Temporal)   Resp 18   Ht 1.727 m (5' 8\")   Wt 91.3 kg (201 lb 6.2 oz)   LMP 2024   SpO2 96%   BMI 30.62 kg/m²    Temp  Min: 36.2 °C (97.2 °F)  Max: 36.5 °C (97.7 °F)  Pulse  Min: 89  Max: 107  BP  Min: 103/70  Max: 117/77    General: No acute distress  Cardiovascular: Warm and well perfused  Respiratory: Normal respiratory effort  Abdominal:  Soft, gravid, nontender to palpation  Extremities: Warm, well perfused    NST: Baseline 135 / mod variability / pos accels / neg decels  [place]: acontractile       ASSESSMENT AND PLAN:     24 y.o.  at 32w4d by ultrasound at 19w3d with PPROM.      PPROM  - Hx of oligo since first formal US on  and again on , JIM 4.2 cm. Amniosure positive at OSH prior to transfer. Speculum exam neg x3 on admission to [facility]. Actim Prom negative on . Given high suspicion for PRROM, planning to continue management as such, patient now reports continued leakage of fluid  - S/p latency antibiotics completed   - S/p BMZ -, rBMZ 3/16-  - S/p NICU consult   - S/p genetic counseling due to early oligohydramnios - MaterniT Genome screening performed and negative for tested conditions  - Planning to continue inpatient management until 34.0 wga unless delivery indicated prior for fetal or maternal compromise    Fetal status  Fetal Growth Restriction  - NST reactive this AM  - continue daily NSTs while admitted   - Last growth Ultrasound:  EFW 1326g (6%), AC 6%, normal dopplers   - breech , weekly BPPs, BPP pending this AM     Routine   - GBS negative, 2/10. Recollected 3/16, pos.  - BCM: POPs   - 1 hr gtt 99   - TDAP 25     Seen & d/w Dr. Tricia Easley MD PGY-3  Maternal Fetal Medicine, Pager " HPI:          Patient is a 74 y.o. male in no acute distress. He is alert and oriented to person, place, and time. History  12/2019 Referral from Dr. Melita Lopez for elevated PSA of 6.13. He was a previous patient of Dr. Chaz Shields for elevated PSA. Several brothers with prostate cancer.      PSA  4/2021 - 3.68  10/2020 - 5.43  4/2020 - 4.42  1/2020 - 5.00  11/2019 - 6.13  11/2018 - 3.11  11/2017 - 3.26  11/2016 - 2.43  10/2015 - 2.70  10/2014 - 2.35  10/2013 - 2.15     11/2020 Prostate biopsy for PSA of 5.43. Pathology: benign prostate tissue in all 12 cores     Currently  Patient is here today for lower check visualization. Patient does not wish to have a cystoscopy performed. Patient has been having intermittent gross hematuria. We did go ahead with a CT urogram.  This film was independently reviewed today. Patient does not wish to have a cystoscopy he does understand that we cannot rule out any abnormalities and bladder without cystoscopy. Past Medical History:   Diagnosis Date    Acute MI ([address]     Acute renal failure with tubular necrosis ([address] 10/2/2018    ssecondary to hemodynamic effects of loop diuretics and ace inhibitors, bbaseline 1.2-1.3 which peaked up to 1.8, resolving    Anemia     CAD (coronary artery disease)     Cerebral artery occlusion with cerebral infarction ([address]     CHF (congestive heart failure) (HCC)     Chronic back pain     Closed fracture of lumbar vertebra without mention of spinal cord injury     Displacement of intervertebral disc, site unspecified, without myelopathy     H/O cardiac catheterization 2/19/16    LMCA: Mild irregularities 10-20%. LAD: Lesion on PRox LAD: Mid subsection. 65% stenosis. LCx: Lesion on 1st Ob Valentina: Proximal subesection. 70% steniosis. RCA: Small non-dominant RCA. Lesion on PRox RCA: Ostial. 50% stenosis. EF:55%.      H/O cardiovascular stress test 2/19/16    Abnormal. Moderate perfusion defect of mild intensity in the inferior, inferoseptal adn inferoapical regions during stress imaging, which most consistent with ischemia. Global LV systolic function normal without regional wall motion abnormalities. OVerall these results are most consistent with an intermediate risk for signficant CAD. Additional testing including cardiac cath may be indicated.  H/O tilt table evaluation 12/26/2017    Abnormal. Patients HR, BP response and symptoms were most consistent with dysautonomia. Combined with viligant maintenance of euvolemia and maintaining a moderate salft intake, pharmacologic treatment with SSRI such as lexapro and or mestinon among other treatments have shown some effectiveness in treatment of this condition    H/O tilt table evaluation 12/26/2017    Abnormal head upright tilt table study. The pt heart rate, blood pressure response and symptoms were most consistent with dysautonomia.  History of 24 hour EKG monitoring 8/14/14    Occasional PAC's and PVC's which appear to be at least moderately symptomatic.  History of 24 hour EKG monitoring 11/19/14    Event Monitor. Sinus rhythm and sinus bradycardia. Infrequent isolated PVC's    History of cardiovascular stress test 2/19/14    Relatively NL    History of cardiovascular stress test 03/21/2018    Normal myocardial perfusion. Global left ventricular systolic function was normal with an EF of 68%. Overall, these results are most consistent with a low risk scan.  History of coronary artery stent placement 04/2017    PTCA / Drug Eluting Stent:, CX and / or branches    History of CVA (cerebrovascular accident) without residual deficits 2014    Incidentally found on CT head. No known impairment now or in the past.    History of echocardiogram 2/18/14    LA mildly dilated, EF 55%, LV wall thickness is moderately increased, no definite wall motion abnormalilities, what appears to be a pacer wire is seen w/n the RA and RV, mild-mod TR, mild pulmonary hypertension.      History of [id number]     Holter monitoring 12/29/2017    Rare PAC's and PVC's.      History of tilt table evaluation 8/12/14    Abnormal    Hyperlipidemia     Hypertension     Non critical Right Renal artery stenosis, native ([facility] 10/2/2018    Non critical Right Renal artery stenosis, based on cath in 2016, Rt RA 30% stenosis    Pacemaker     non-functioning    S/P cardiac cath 04/10/2017    S/P coronary artery stent placement     Successful PTCA - HA Om1    SIRS (systemic inflammatory response syndrome) ([facility] 5/19/2019    Type II or unspecified type diabetes mellitus without mention of complication, not stated as uncontrolled      Past Surgical History:   Procedure Laterality Date    BACK SURGERY      CARDIAC CATHETERIZATION Left 2/19/16    via right radial approach/ [facility]/ Dr. Yeboah Search  8/17/15    [place]/[facility]/Saulo/ [facility]    COLONOSCOPY  2010    COLONOSCOPY  2/19/15    -polyps,diverticulosis,hemorrhoids    COLONOSCOPY  05/23/2018    Dr Shamika Narvaez    COLONOSCOPY N/A 5/23/2018    COLONOSCOPY POLYPECTOMY SNARE/COLD BIOPSY  cold snare  and  hot snare performed by Lincoln Velasco MD at [facility]  10/30/2020    with Dr. Camila Mata 10/30/2020    Beatris Sandra, NOT HIGH RISK performed by Hamzah Tovar DO at [address] [place] [address]      left eye    PACEMAKER INSERTION      PACEMAKER PLACEMENT      UPPER GASTROINTESTINAL ENDOSCOPY  05/28/2019    Dr. Jessica Belcher H-Pylori)duodenal bulbar/antral erythema    UPPER GASTROINTESTINAL ENDOSCOPY N/A 5/28/2019    EGD BIOPSY, clotest performed by Lincoln Velasco MD at [facility] 10/30/2020    EGD ESOPHAGOGASTRODUODENOSCOPY performed by Hamzah Tovar DO at [address] Encounter Medications as of 5/24/2021   Medication Sig Dispense Refill    pantoprazole (PROTONIX) 40 MG tablet Take 1 tablet by mouth once daily 30 tablet 5    doxazosin (CARDURA) 4 MG tablet Take 1 tablet by mouth nightly 90 tablet 3    latanoprost (XALATAN) 0.005 % ophthalmic solution       insulin glargine (LANTUS SOLOSTAR) 100 UNIT/ML injection pen Inject 28 Units into the skin 2 times daily 50.4 mL 0    eplerenone (INSPRA) 50 MG tablet Take 1 tablet by mouth once daily 90 tablet 0    carvedilol (COREG) 25 MG tablet Take 1 tablet by mouth 2 times daily (with meals) 180 tablet 3    lisinopril (PRINIVIL;ZESTRIL) 10 MG tablet Take 1 tablet by mouth daily 90 tablet 3    glipiZIDE (GLUCOTROL XL) 5 MG extended release tablet Take 2 tablets by mouth twice daily 360 tablet 0    ezetimibe (ZETIA) 10 MG tablet Take 1 tablet by mouth daily 90 tablet 3    acyclovir (ZOVIRAX) 400 MG tablet 400 mg 2 times daily       ferrous sulfate 325 (65 Fe) MG tablet Take 325 mg by mouth daily (with breakfast)       CONTOUR TEST strip USE 1 STRIP TO CHECK GLUCOSE TWICE DAILY 100 strip 11    nitroGLYCERIN (NITROSTAT) 0.4 MG SL tablet Place 1 tablet under the tongue every 5 minutes as needed for Chest pain 25 tablet 3    Omega-3 Fatty Acids (FISH OIL) 1000 MG CAPS Take 1,000 mg by mouth daily       aspirin EC 81 MG EC tablet Take 1 tablet by mouth daily 30 tablet 3    DIANA MICROLET LANCETS MISC TEST TWICE DAILY 100 each 2    Insulin Pen Needle (PEN NEEDLES) 31G X 6 MM MISC 1 each by Does not apply route daily 100 each 3    prednisoLONE acetate (PRED FORTE) 1 % ophthalmic suspension Place 1 drop into the left eye daily       furosemide (LASIX) 20 MG tablet Take 1 tablet by mouth See Admin Instructions for 3 days Pt is to take one tablet by mouth on Monday, Wednesday and Friday. 90 tablet 0     No facility-administered encounter medications on file as of 5/24/2021.       Current Outpatient Medications on File Prior to Visit   Medication Sig Dispense Refill    pantoprazole (PROTONIX) 40 MG tablet Take 1 tablet by mouth once daily 30 tablet 5    doxazosin (CARDURA) 4 MG tablet Take 1 tablet by mouth nightly 90 tablet 3    latanoprost (XALATAN) 0.005 % ophthalmic solution       insulin glargine (LANTUS SOLOSTAR) 100 UNIT/ML injection pen Inject 28 Units into the skin 2 times daily 50.4 mL 0    eplerenone (INSPRA) 50 MG tablet Take 1 tablet by mouth once daily 90 tablet 0    carvedilol (COREG) 25 MG tablet Take 1 tablet by mouth 2 times daily (with meals) 180 tablet 3    lisinopril (PRINIVIL;ZESTRIL) 10 MG tablet Take 1 tablet by mouth daily 90 tablet 3    glipiZIDE (GLUCOTROL XL) 5 MG extended release tablet Take 2 tablets by mouth twice daily 360 tablet 0    ezetimibe (ZETIA) 10 MG tablet Take 1 tablet by mouth daily 90 tablet 3    acyclovir (ZOVIRAX) 400 MG tablet 400 mg 2 times daily       ferrous sulfate 325 (65 Fe) MG tablet Take 325 mg by mouth daily (with breakfast)       CONTOUR TEST strip USE 1 STRIP TO CHECK GLUCOSE TWICE DAILY 100 strip 11    nitroGLYCERIN (NITROSTAT) 0.4 MG SL tablet Place 1 tablet under the tongue every 5 minutes as needed for Chest pain 25 tablet 3    Omega-3 Fatty Acids (FISH OIL) 1000 MG CAPS Take 1,000 mg by mouth daily       aspirin EC 81 MG EC tablet Take 1 tablet by mouth daily 30 tablet 3    DIANA MICROLET LANCETS MISC TEST TWICE DAILY 100 each 2    Insulin Pen Needle (PEN NEEDLES) 31G X 6 MM MISC 1 each by Does not apply route daily 100 each 3    prednisoLONE acetate (PRED FORTE) 1 % ophthalmic suspension Place 1 drop into the left eye daily       furosemide (LASIX) 20 MG tablet Take 1 tablet by mouth See Admin Instructions for 3 days Pt is to take one tablet by mouth on Monday, Wednesday and Friday. 90 tablet 0     No current facility-administered medications on file prior to visit.      Lipitor [atorvastatin], Aldactone [spironolactone], Crestor [rosuvastatin calcium], Lopid [gemfibrozil], Invokana [canagliflozin], and Januvia [sitagliptin]  Family History   Problem Relation Age of Onset    Cancer Mother         breast   

## 2025-04-15 NOTE — CARE PLAN
The patient's goals for the shift include maintain pregnancy    The clinical goals for the shift include no s/sx of infection    Over the shift, the patient did not make progress toward the following goals. Barriers to progression include none. Recommendations to address these barriers include none.

## 2025-04-16 PROCEDURE — 2500000001 HC RX 250 WO HCPCS SELF ADMINISTERED DRUGS (ALT 637 FOR MEDICARE OP): Performed by: STUDENT IN AN ORGANIZED HEALTH CARE EDUCATION/TRAINING PROGRAM

## 2025-04-16 PROCEDURE — 2500000001 HC RX 250 WO HCPCS SELF ADMINISTERED DRUGS (ALT 637 FOR MEDICARE OP)

## 2025-04-16 PROCEDURE — 1220000001 HC OB SEMI-PRIVATE ROOM DAILY

## 2025-04-16 PROCEDURE — 59025 FETAL NON-STRESS TEST: CPT | Mod: GC

## 2025-04-16 RX ADMIN — PRENATAL VIT W/ FE FUMARATE-FA TAB 27-0.8 MG 1 TABLET: 27-0.8 TAB at 09:52

## 2025-04-16 RX ADMIN — FAMOTIDINE 20 MG: 20 TABLET ORAL at 09:52

## 2025-04-16 RX ADMIN — FAMOTIDINE 20 MG: 20 TABLET ORAL at 20:28

## 2025-04-16 ASSESSMENT — PAIN - FUNCTIONAL ASSESSMENT
PAIN_FUNCTIONAL_ASSESSMENT: 0-10

## 2025-04-16 ASSESSMENT — PAIN SCALES - GENERAL
PAINLEVEL_OUTOF10: 0 - NO PAIN

## 2025-04-16 NOTE — PROGRESS NOTES
"ANTEPARTUM PROGRESS NOTE     SUBJECTIVE: No acute events overnight. Denies any cramping or contractions. Denies fever or chills. Denies VB or change in discharge. Normal fetal movement.    OBJECTIVE:   /75   Pulse 86   Temp 36.1 °C (97 °F) (Temporal)   Resp 18   Ht 1.727 m (5' 8\")   Wt 91.3 kg (201 lb 6.2 oz)   LMP 2024   SpO2 98%   BMI 30.62 kg/m²    Temp  Min: 36.1 °C (97 °F)  Max: 37.2 °C (99 °F)  Pulse  Min: 75  Max: 91  BP  Min: 105/75  Max: 120/81    General: No acute distress  Cardiovascular: Warm and well perfused  Respiratory: Normal respiratory effort  Abdominal:  Soft, gravid, nontender to palpation  Extremities: Warm, well perfused    NST: Baseline 135 / mod variability / pos accels / neg decels  [place]: acontractile       ASSESSMENT AND PLAN:     24 y.o.  at 32w5d by ultrasound at 19w3d with PPROM.      PPROM  - Hx of oligo since first formal US on  and again on , JIM 4.2 cm. Amniosure positive at OSH prior to transfer. Speculum exam neg x3 on admission to [facility]. Actim Prom negative on . Given high suspicion for PRROM, planning to continue management as such, patient now reports continued leakage of fluid  - S/p latency antibiotics completed   - S/p BMZ -, rBMZ 3/16-  - S/p NICU consult   - S/p genetic counseling due to early oligohydramnios - MaterniT Genome screening performed and negative for tested conditions  - Planning to continue inpatient management until 34.0 wga unless delivery indicated prior for fetal or maternal compromise    Fetal status  Fetal Growth Restriction  - NST reactive this AM  - continue daily NSTs while admitted   - Last growth Ultrasound:  EFW 1326g (6%), AC 6%, normal dopplers   - breech 4/15, cont weekly BPPs, BPP 4/15 with JIM 4.6     Routine   - GBS negative, 2/10. Recollected 3/16, pos.  - BCM: POPs   - 1 hr gtt 99   - TDAP 25     Seen & d/w Dr. Tricia Brooks MD PGY-1  Maternal Fetal Medicine, Pager " [phone]

## 2025-04-17 LAB
ABO GROUP (TYPE) IN BLOOD: NORMAL
ANTIBODY SCREEN: NORMAL
RH FACTOR (ANTIGEN D): NORMAL

## 2025-04-17 PROCEDURE — 86901 BLOOD TYPING SEROLOGIC RH(D): CPT

## 2025-04-17 PROCEDURE — 59025 FETAL NON-STRESS TEST: CPT | Mod: GC

## 2025-04-17 PROCEDURE — 2500000001 HC RX 250 WO HCPCS SELF ADMINISTERED DRUGS (ALT 637 FOR MEDICARE OP)

## 2025-04-17 PROCEDURE — 1220000001 HC OB SEMI-PRIVATE ROOM DAILY

## 2025-04-17 PROCEDURE — 2500000001 HC RX 250 WO HCPCS SELF ADMINISTERED DRUGS (ALT 637 FOR MEDICARE OP): Performed by: STUDENT IN AN ORGANIZED HEALTH CARE EDUCATION/TRAINING PROGRAM

## 2025-04-17 PROCEDURE — 36415 COLL VENOUS BLD VENIPUNCTURE: CPT

## 2025-04-17 RX ADMIN — PRENATAL VIT W/ FE FUMARATE-FA TAB 27-0.8 MG 1 TABLET: 27-0.8 TAB at 08:50

## 2025-04-17 RX ADMIN — FAMOTIDINE 20 MG: 20 TABLET ORAL at 08:50

## 2025-04-17 RX ADMIN — FAMOTIDINE 20 MG: 20 TABLET ORAL at 20:11

## 2025-04-17 ASSESSMENT — PAIN - FUNCTIONAL ASSESSMENT
PAIN_FUNCTIONAL_ASSESSMENT: 0-10

## 2025-04-17 ASSESSMENT — PAIN SCALES - GENERAL
PAINLEVEL_OUTOF10: 0 - NO PAIN

## 2025-04-17 NOTE — CARE PLAN
The patient's goals for the shift include Maintain pregnancy    The clinical goals for the shift include Pt to remain absent of s/sx of infection.    Over the shift, the patient did make progress toward the following goals.

## 2025-04-17 NOTE — PROGRESS NOTES
"ANTEPARTUM PROGRESS NOTE     SUBJECTIVE: No acute events overnight. Denies any cramping or contractions. Denies fever or chills. Denies VB or change in discharge. Normal fetal movement.    OBJECTIVE:   /71   Pulse 89   Temp 37.2 °C (99 °F) (Temporal)   Resp 18   Ht 1.727 m (5' 8\")   Wt 91.3 kg (201 lb 6.2 oz)   LMP 2024   SpO2 97%   BMI 30.62 kg/m²    Temp  Min: 36.9 °C (98.4 °F)  Max: 37.2 °C (99 °F)  Pulse  Min: 89  Max: 100  BP  Min: 115/71  Max: 117/81    General: No acute distress  Cardiovascular: Warm and well perfused  Respiratory: Normal respiratory effort  Abdominal:  Soft, gravid, nontender to palpation  Extremities: Warm, well perfused    NST: Baseline 135 / mod variability / pos accels / neg decels  [place]: acontractile       ASSESSMENT AND PLAN:     24 y.o.  at 32w6d by ultrasound at 19w3d with PPROM.      PPROM  - Hx of oligo since first formal US on  and again on , JIM 4.2 cm. Amniosure positive at OSH prior to transfer. Speculum exam neg x3 on admission to [facility]. Actim Prom negative on . Given high suspicion for PRROM, planning to continue management as such, patient now reports continued leakage of fluid  - S/p latency antibiotics completed   - S/p BMZ -, rBMZ 3/16-  - S/p NICU consult   - S/p genetic counseling due to early oligohydramnios - MaterniT Genome screening performed and negative for tested conditions  - Planning to continue inpatient management until 34.0 wga unless delivery indicated prior for fetal or maternal compromise    Fetal status  Fetal Growth Restriction  - NST reactive this AM  - continue daily NSTs while admitted   - Last growth Ultrasound:  EFW 1326g (6%), AC 6%, normal dopplers   - breech 4/15, cont weekly BPPs, BPP 4/15 with JIM 4.6     Routine   - GBS negative, 2/10. Recollected 3/16, pos.  - BCM: POPs   - 1 hr gtt 99   - TDAP 25     Seen & d/w Dr. Tricia Dykes MD PGY-2  Maternal Fetal Medicine, Pager " [phone]

## 2025-04-18 PROCEDURE — 59025 FETAL NON-STRESS TEST: CPT | Mod: GC

## 2025-04-18 PROCEDURE — 2500000001 HC RX 250 WO HCPCS SELF ADMINISTERED DRUGS (ALT 637 FOR MEDICARE OP): Performed by: STUDENT IN AN ORGANIZED HEALTH CARE EDUCATION/TRAINING PROGRAM

## 2025-04-18 PROCEDURE — 1220000001 HC OB SEMI-PRIVATE ROOM DAILY

## 2025-04-18 PROCEDURE — 2500000001 HC RX 250 WO HCPCS SELF ADMINISTERED DRUGS (ALT 637 FOR MEDICARE OP)

## 2025-04-18 RX ADMIN — PRENATAL VIT W/ FE FUMARATE-FA TAB 27-0.8 MG 1 TABLET: 27-0.8 TAB at 08:46

## 2025-04-18 RX ADMIN — FAMOTIDINE 20 MG: 20 TABLET ORAL at 08:46

## 2025-04-18 RX ADMIN — FAMOTIDINE 20 MG: 20 TABLET ORAL at 20:22

## 2025-04-18 ASSESSMENT — PAIN SCALES - GENERAL
PAINLEVEL_OUTOF10: 0 - NO PAIN

## 2025-04-18 ASSESSMENT — PAIN - FUNCTIONAL ASSESSMENT
PAIN_FUNCTIONAL_ASSESSMENT: 0-10

## 2025-04-18 NOTE — CARE PLAN
The patient's goals for the shift include maintain pregnancy    The clinical goals for the shift include pt to remain absent of s/sx of infection    Over the shift, the patient did not make progress toward the following goals. Barriers to progression include ***. Recommendations to address these barriers include ***.

## 2025-04-18 NOTE — CARE PLAN
The patient's goals for the shift include maintain pregnancy    The clinical goals for the shift include pt to remain absent of s/sx of infection    Pt is doing well throughout the night and vss. Pt denies of bleeding or contractions. Pt endorses good fetal movement. Pt is resting comfortably and has no other complaints at this time.

## 2025-04-18 NOTE — PROGRESS NOTES
"ANTEPARTUM PROGRESS NOTE     SUBJECTIVE: No acute events overnight. Reports mild cramping during NST this morning but resolved now. Denies fever or chills. Denies VB or change in discharge. Normal fetal movement.    OBJECTIVE:   /76   Pulse 85   Temp 36.8 °C (98.2 °F) (Temporal)   Resp 16   Ht 1.727 m (5' 8\")   Wt 91.3 kg (201 lb 6.2 oz)   LMP 2024   SpO2 96%   BMI 30.62 kg/m²    Temp  Min: 36.3 °C (97.3 °F)  Max: 36.8 °C (98.2 °F)  Pulse  Min: 85  Max: 92  BP  Min: 99/68  Max: 130/76    General: No acute distress  Cardiovascular: Warm and well perfused  Respiratory: Normal respiratory effort  Abdominal:  Soft, gravid, nontender to palpation  Extremities: Warm, well perfused    NST: Baseline 130 / mod variability / pos accels / neg decels  [place]: acontractile       ASSESSMENT AND PLAN:     24 y.o.  at 33w0d by ultrasound at 19w3d with PPROM.      PPROM  - Hx of oligo since first formal US on  and again on , JIM 4.2 cm. Amniosure positive at OSH prior to transfer. Speculum exam neg x3 on admission to [facility]. Actim Prom negative on . Given high suspicion for PRROM, planning to continue management as such, patient now reports continued leakage of fluid  - S/p latency antibiotics completed   - S/p BMZ -, rBMZ 3/16-  - S/p NICU consult   - S/p genetic counseling due to early oligohydramnios - MaterniT Genome screening performed and negative for tested conditions  - Planning to continue inpatient management until 34.0 wga unless delivery indicated prior for fetal or maternal compromise    Fetal Status  Fetal Growth Restriction  - NST reactive this AM  - continue daily NSTs while admitted   - Last growth Ultrasound:  EFW 1326g (6%), AC 6%, normal dopplers   - breech , cont weekly BPPs, BPP 4/15 with JIM 4.6     Routine   - GBS negative, 2/10. Recollected 3/16, pos.  - BCM: POPs   - 1 hr gtt 99   - TDAP 2/28/25    Dispo: Continue inpatient management with plan for " delivery at 34wga pending maternal and fetal status     Adriano Dunn, MS3    Patient seen and evaluated with medical student. Agree with assessment above, edits made within text.    Patient seen and discussed with MFM Attending, Dr. Radha Brooks MD, PGY-1  [facility], Pager [phone]

## 2025-04-19 PROCEDURE — 1220000001 HC OB SEMI-PRIVATE ROOM DAILY

## 2025-04-19 PROCEDURE — 2500000001 HC RX 250 WO HCPCS SELF ADMINISTERED DRUGS (ALT 637 FOR MEDICARE OP)

## 2025-04-19 PROCEDURE — 59025 FETAL NON-STRESS TEST: CPT | Mod: GC

## 2025-04-19 PROCEDURE — 2500000001 HC RX 250 WO HCPCS SELF ADMINISTERED DRUGS (ALT 637 FOR MEDICARE OP): Performed by: STUDENT IN AN ORGANIZED HEALTH CARE EDUCATION/TRAINING PROGRAM

## 2025-04-19 RX ADMIN — FAMOTIDINE 20 MG: 20 TABLET ORAL at 09:05

## 2025-04-19 RX ADMIN — PRENATAL VIT W/ FE FUMARATE-FA TAB 27-0.8 MG 1 TABLET: 27-0.8 TAB at 09:05

## 2025-04-19 RX ADMIN — FAMOTIDINE 20 MG: 20 TABLET ORAL at 20:40

## 2025-04-19 ASSESSMENT — PAIN - FUNCTIONAL ASSESSMENT
PAIN_FUNCTIONAL_ASSESSMENT: 0-10

## 2025-04-19 ASSESSMENT — PAIN SCALES - GENERAL
PAINLEVEL_OUTOF10: 0 - NO PAIN

## 2025-04-19 NOTE — PROGRESS NOTES
"ANTEPARTUM PROGRESS NOTE     SUBJECTIVE: No acute events overnight. Reports mild cramping during NST this morning but resolved now. Denies fever or chills. Denies VB or change in discharge. Normal fetal movement.    OBJECTIVE:   /73   Pulse 87   Temp 37.2 °C (99 °F) (Temporal)   Resp 16   Ht 1.727 m (5' 8\")   Wt 91.3 kg (201 lb 6.2 oz)   LMP 2024   SpO2 96%   BMI 30.62 kg/m²    Temp  Min: 36.8 °C (98.2 °F)  Max: 37.6 °C (99.7 °F)  Pulse  Min: 87  Max: 115  BP  Min: 105/71  Max: 137/87    General: No acute distress  Cardiovascular: Warm and well perfused  Respiratory: Normal respiratory effort  Abdominal:  Soft, gravid, nontender to palpation  Extremities: Warm, well perfused    NST: Baseline 140 / mod variability / pos accels / neg decels  [place]: acontractile       ASSESSMENT AND PLAN:     24 y.o.  at 33w1d by ultrasound at 19w3d with PPROM.      PPROM  - Hx of oligo since first formal US on  and again on , JIM 4.2 cm. Amniosure positive at OSH prior to transfer. Speculum exam neg x3 on admission to [facility]. Actim Prom negative on . Given high suspicion for PRROM, planning to continue management as such, patient now reports continued leakage of fluid  - S/p latency antibiotics completed   - S/p BMZ -, rBMZ 3/16-  - S/p NICU consult   - S/p genetic counseling due to early oligohydramnios - MaterniT Genome screening performed and negative for tested conditions  - Planning to continue inpatient management until 34.0 wga unless delivery indicated prior for fetal or maternal compromise    Fetal Status  Fetal Growth Restriction  - NST reactive this AM  - continue daily NSTs while admitted   - Last growth Ultrasound:  EFW 1326g (6%), AC 6%, normal dopplers   - breech , cont weekly BPPs, BPP 4/15 with JIM 4.6     Routine   - GBS negative, 2/10. Recollected 3/16, pos.  - BCM: POPs   - 1 hr gtt 99   - TDAP 25    Dispo: Continue inpatient management with plan for " delivery at 34wga pending maternal and fetal status     Seen and discussed w/ Dr. Radha Dykes MD PGY-2  Maternal Fetal Medicine, Pager [phone]

## 2025-04-20 LAB
ABO GROUP (TYPE) IN BLOOD: NORMAL
ANTIBODY SCREEN: NORMAL
RH FACTOR (ANTIGEN D): NORMAL

## 2025-04-20 PROCEDURE — 2500000001 HC RX 250 WO HCPCS SELF ADMINISTERED DRUGS (ALT 637 FOR MEDICARE OP): Performed by: STUDENT IN AN ORGANIZED HEALTH CARE EDUCATION/TRAINING PROGRAM

## 2025-04-20 PROCEDURE — 86901 BLOOD TYPING SEROLOGIC RH(D): CPT

## 2025-04-20 PROCEDURE — 36415 COLL VENOUS BLD VENIPUNCTURE: CPT

## 2025-04-20 PROCEDURE — 1220000001 HC OB SEMI-PRIVATE ROOM DAILY

## 2025-04-20 PROCEDURE — 99232 SBSQ HOSP IP/OBS MODERATE 35: CPT

## 2025-04-20 PROCEDURE — 59025 FETAL NON-STRESS TEST: CPT | Mod: GC

## 2025-04-20 PROCEDURE — 2500000001 HC RX 250 WO HCPCS SELF ADMINISTERED DRUGS (ALT 637 FOR MEDICARE OP)

## 2025-04-20 PROCEDURE — 59025 FETAL NON-STRESS TEST: CPT

## 2025-04-20 RX ADMIN — PRENATAL VIT W/ FE FUMARATE-FA TAB 27-0.8 MG 1 TABLET: 27-0.8 TAB at 09:08

## 2025-04-20 RX ADMIN — FAMOTIDINE 20 MG: 20 TABLET ORAL at 09:08

## 2025-04-20 RX ADMIN — FAMOTIDINE 20 MG: 20 TABLET ORAL at 20:06

## 2025-04-20 ASSESSMENT — PAIN SCALES - GENERAL
PAINLEVEL_OUTOF10: 0 - NO PAIN

## 2025-04-20 ASSESSMENT — PAIN - FUNCTIONAL ASSESSMENT
PAIN_FUNCTIONAL_ASSESSMENT: 0-10

## 2025-04-20 NOTE — PROGRESS NOTES
"ANTEPARTUM PROGRESS NOTE     SUBJECTIVE: No acute events overnight. Denies cramping or abdominal pain. Denies fever or chills. Denies VB or change in discharge. Normal fetal movement.    OBJECTIVE:   /74   Pulse 78   Temp 36.9 °C (98.4 °F) (Temporal)   Resp 18   Ht 1.727 m (5' 8\")   Wt 91.3 kg (201 lb 6.2 oz)   LMP 2024   SpO2 98%   BMI 30.62 kg/m²    Temp  Min: 36.3 °C (97.3 °F)  Max: 37 °C (98.6 °F)  Pulse  Min: 78  Max: 100  BP  Min: 104/68  Max: 129/74    General: No acute distress  Cardiovascular: Warm and well perfused  Respiratory: Normal respiratory effort  Abdominal:  Soft, gravid, nontender to palpation  Extremities: Warm, well perfused    NST: Baseline 135 / mod variability / pos accels / neg decels  [place]: acontractile       ASSESSMENT AND PLAN:     24 y.o.  at 33w2d by ultrasound at 19w3d with PPROM.      PPROM  - Hx of oligo since first formal US on  and again on , JIM 4.2 cm. Amniosure positive at OSH prior to transfer. Speculum exam neg x3 on admission to [facility]. Actim Prom negative on . Given high suspicion for PRROM, planning to continue management as such, patient now reports continued leakage of fluid  - S/p latency antibiotics completed   - S/p BMZ -, rBMZ 3/16-  - S/p NICU consult   - S/p genetic counseling due to early oligohydramnios - MaterniT Genome screening performed and negative for tested conditions  - Planning to continue inpatient management until 34.0 wga unless delivery indicated prior for fetal or maternal compromise    Fetal Status  Fetal Growth Restriction  - NST reactive this AM  - continue daily NSTs while admitted   - Last growth Ultrasound:  EFW 1326g (6%), AC 6%, normal dopplers   - breech , cont weekly BPPs, BPP 4/15 with JIM 4.6     Routine   - GBS negative, 2/10. Recollected 3/16, pos.  - BCM: POPs   - 1 hr gtt 99   - TDAP 25    Dispo: Continue inpatient management with plan for delivery at 34wga pending maternal " and fetal status     Seen and discussed w/ Dr. Radha Dykes MD PGY-2  Maternal Fetal Medicine, Pager [phone]

## 2025-04-21 PROCEDURE — 1220000001 HC OB SEMI-PRIVATE ROOM DAILY

## 2025-04-21 PROCEDURE — 59025 FETAL NON-STRESS TEST: CPT

## 2025-04-21 PROCEDURE — 2500000001 HC RX 250 WO HCPCS SELF ADMINISTERED DRUGS (ALT 637 FOR MEDICARE OP)

## 2025-04-21 PROCEDURE — 2500000001 HC RX 250 WO HCPCS SELF ADMINISTERED DRUGS (ALT 637 FOR MEDICARE OP): Performed by: STUDENT IN AN ORGANIZED HEALTH CARE EDUCATION/TRAINING PROGRAM

## 2025-04-21 PROCEDURE — 99232 SBSQ HOSP IP/OBS MODERATE 35: CPT

## 2025-04-21 PROCEDURE — 59025 FETAL NON-STRESS TEST: CPT | Mod: GC

## 2025-04-21 RX ADMIN — PRENATAL VIT W/ FE FUMARATE-FA TAB 27-0.8 MG 1 TABLET: 27-0.8 TAB at 08:24

## 2025-04-21 RX ADMIN — FAMOTIDINE 20 MG: 20 TABLET ORAL at 20:30

## 2025-04-21 RX ADMIN — FAMOTIDINE 20 MG: 20 TABLET ORAL at 08:25

## 2025-04-21 ASSESSMENT — PAIN SCALES - GENERAL
PAINLEVEL_OUTOF10: 0 - NO PAIN

## 2025-04-21 ASSESSMENT — PAIN - FUNCTIONAL ASSESSMENT
PAIN_FUNCTIONAL_ASSESSMENT: 0-10

## 2025-04-21 NOTE — PROGRESS NOTES
"ANTEPARTUM PROGRESS NOTE     SUBJECTIVE: No acute events overnight. Denies cramping or abdominal pain. Denies fever or chills. Denies VB or change in discharge. Normal fetal movement. No concerns this AM.    OBJECTIVE:   /73   Pulse 84   Temp 36.7 °C (98.1 °F) (Temporal)   Resp 16   Ht 1.727 m (5' 8\")   Wt 91.3 kg (201 lb 6.2 oz)   LMP 2024   SpO2 99%   BMI 30.62 kg/m²    Temp  Min: 36.7 °C (98.1 °F)  Max: 37 °C (98.6 °F)  Pulse  Min: 72  Max: 105  BP  Min: 106/73  Max: 118/81    General: No acute distress  Cardiovascular: Warm and well perfused  Respiratory: Normal respiratory effort  Abdominal:  Soft, gravid, nontender to palpation  Extremities: Warm, well perfused    NST: Baseline 145 / mod variability / pos accels / neg decels  [place]: acontractile       ASSESSMENT AND PLAN:     24 y.o.  at 33w3d by ultrasound at 19w3d with PPROM.      PPROM  - Hx of oligo since first formal US on  and again on , JIM 4.2 cm. Amniosure positive at OSH prior to transfer. Speculum exam neg x3 on admission to [facility]. Actim Prom negative on . Given high suspicion for PRROM, planning to continue management as such, patient now reports continued leakage of fluid  - S/p latency antibiotics completed   - S/p BMZ -, rBMZ 3/16-  - S/p NICU consult   - S/p genetic counseling due to early oligohydramnios - MaterniT Genome screening performed and negative for tested conditions  - Planning to continue inpatient management until 34.0 wga unless delivery indicated prior for fetal or maternal compromise    Fetal Status  Fetal Growth Restriction  - NST reactive this AM  - continue daily NSTs while admitted   - Last growth Ultrasound:  EFW 1326g (6%), AC 6%, normal dopplers   - breech , cont weekly BPPs     Routine   - GBS negative, 2/10. Recollected 3/16, pos.  - BCM: POPs   - 1 hr gtt 99   - TDAP 25    Dispo: Continue inpatient management with plan for delivery at 34wga pending maternal " and fetal status     Seen and discussed w/ Dr. Radha Easley MD PGY-3  Maternal Fetal Medicine, Pager [phone]

## 2025-04-21 NOTE — PROGRESS NOTES
Tatianna Mojica is a 24 y.o. female on day 75 of admission presenting with  premature rupture of membranes (PPROM) with unknown onset of labor ([facility]).    Jeferson met with Ms. Mojica at bedside this AM. Talked through financial needs and offered to write a letter stating that she is in the hospital and have the Dr kwok. She accepted. Provided [email or web address] as a good email. Sent resources for finances, food, and the letter to said email. She reports that her work is giving her long-term disability until 25 & can extend it if provided documentation that she needs additional time off to heal from the C/S. Will provide documentation at that time from .     Reviewed and approved by RENETTA EPPERSON on 25 at 11:26 AM.

## 2025-04-21 NOTE — CARE PLAN
The patient's goals for the shift include Rest    The clinical goals for the shift include Patient will have no s/s of infection by end of shift      Problem: Antepartum  Goal: Maintain pregnancy as long as maternal and/or fetal condition is stable  Outcome: Progressing     Problem:  Labor/Prolonged Premature Rupture of Membranes  Goal: No s/sx of IAI  Outcome: Progressing

## 2025-04-21 NOTE — CARE PLAN
The patient's goals for the shift include Rest    The clinical goals for the shift include Patient will have no s/s of infection by end of shift    Patient remained free from falls/injury throughout shift. VSS and no s/sx of infection at this time. Patient denies n/v, fever, or chills. FHR remained reassuring with spot check throughout shift. No changes in amniotic fluid throughout shift, and patient declines abd tenderness, ctx or cramping, and vaginal bleeding. Patient resting comfortably in bed and denies needs at this time.

## 2025-04-22 ENCOUNTER — APPOINTMENT (OUTPATIENT)
Dept: RADIOLOGY | Facility: HOSPITAL | Age: 25
End: 2025-04-22
Payer: COMMERCIAL

## 2025-04-22 PROCEDURE — 59025 FETAL NON-STRESS TEST: CPT | Mod: GC

## 2025-04-22 PROCEDURE — 76820 UMBILICAL ARTERY ECHO: CPT

## 2025-04-22 PROCEDURE — 76816 OB US FOLLOW-UP PER FETUS: CPT

## 2025-04-22 PROCEDURE — 2500000001 HC RX 250 WO HCPCS SELF ADMINISTERED DRUGS (ALT 637 FOR MEDICARE OP): Performed by: STUDENT IN AN ORGANIZED HEALTH CARE EDUCATION/TRAINING PROGRAM

## 2025-04-22 PROCEDURE — 2500000001 HC RX 250 WO HCPCS SELF ADMINISTERED DRUGS (ALT 637 FOR MEDICARE OP)

## 2025-04-22 PROCEDURE — 1220000001 HC OB SEMI-PRIVATE ROOM DAILY

## 2025-04-22 RX ADMIN — PRENATAL VIT W/ FE FUMARATE-FA TAB 27-0.8 MG 1 TABLET: 27-0.8 TAB at 08:01

## 2025-04-22 RX ADMIN — FAMOTIDINE 20 MG: 20 TABLET ORAL at 21:08

## 2025-04-22 RX ADMIN — FAMOTIDINE 20 MG: 20 TABLET ORAL at 08:01

## 2025-04-22 ASSESSMENT — PAIN - FUNCTIONAL ASSESSMENT
PAIN_FUNCTIONAL_ASSESSMENT: 0-10

## 2025-04-22 ASSESSMENT — PAIN SCALES - GENERAL
PAINLEVEL_OUTOF10: 0 - NO PAIN

## 2025-04-22 NOTE — CARE PLAN
The patient's goals for the shift include Sleep in day after u/s    The clinical goals for the shift include Pt will not develp s/sx of infection or labor and fhr will remain reassuring    Pt stable without s/sx of infection or labor. NST reactive this morning. FHR checks remain WDL and FM present. Growth ultrasound completed this morning. Baby continues to be breech. Plan for delivery at 34 weeks.    Detail Level: Zone General Sunscreen Counseling: I recommended a broad spectrum sunscreen with a SPF of 30 or higher.  I explained that SPF 30 sunscreens block approximately 97 percent of the sun's harmful rays.  Sunscreens should be applied at least 15 minutes prior to expected sun exposure and then every 2 hours after that as long as sun exposure continues. If swimming or exercising sunscreen should be reapplied every 45 minutes to an hour after getting wet or sweating.  One ounce, or the equivalent of a shot glass full of sunscreen, is adequate to protect the skin not covered by a bathing suit. I also recommended a lip balm with a sunscreen as well. Sun protective clothing can be used in lieu of sunscreen but must be worn the entire time you are exposed to the sun's rays.

## 2025-04-22 NOTE — PROGRESS NOTES
"ANTEPARTUM PROGRESS NOTE     SUBJECTIVE: No acute events overnight. Denies cramping or abdominal pain. Denies fever or chills. Denies VB or change in discharge. Normal fetal movement.     OBJECTIVE:   /76 (BP Location: Left arm, Patient Position: Lying)   Pulse 101   Temp 36.4 °C (97.5 °F) (Temporal)   Resp 18   Ht 1.727 m (5' 8\")   Wt 91.3 kg (201 lb 6.2 oz)   LMP 2024   SpO2 97%   BMI 30.62 kg/m²    Temp  Min: 36.4 °C (97.5 °F)  Max: 37.1 °C (98.8 °F)  Pulse  Min: 81  Max: 101  BP  Min: 112/76  Max: 115/78    General: No acute distress  Cardiovascular: Warm and well perfused  Respiratory: Normal respiratory effort  Abdominal:  Soft, gravid, nontender to palpation  Extremities: Warm, well perfused    NST: Baseline 135 / mod variability / pos accels / variable decel x 1 with 1 hour reassuring tracing following  [place]: acontractile       ASSESSMENT AND PLAN:     24 y.o.  at 33w4d by ultrasound at 19w3d with PPROM.      PPROM  - Hx of oligo since first formal US on  and again on , JIM 4.2 cm. Amniosure positive at OSH prior to transfer. Speculum exam neg x3 on admission to [facility]. Actim Prom negative on . Given high suspicion for PRROM, planning to continue management as such, patient now reports continued leakage of fluid  - S/p latency antibiotics completed   - S/p BMZ -, rBMZ 3/16-  - S/p NICU consult   - S/p genetic counseling due to early oligohydramnios - MaterniT Genome screening performed and negative for tested conditions  - Planning to continue inpatient management until 34.0 wga unless delivery indicated prior for fetal or maternal compromise    Fetal Status  Fetal Growth Restriction  - NST reactive this AM  - continue daily NSTs while admitted   - Last growth Ultrasound:  EFW 1326g (6%), AC 6%, normal dopplers   - breech , cont weekly BPPs, for BPP/dopplers today     Routine   - GBS negative, 2/10. Recollected 3/16, pos.  - BCM: POPs   - 1 hr gtt 99   - " TDAP 2/28/25    Dispo: Continue inpatient management with plan for delivery at 34wga pending maternal and fetal status     Seen and discussed w/ Dr. Radha Easley MD PGY-3  Maternal Fetal Medicine, Pager [phone]

## 2025-04-23 ENCOUNTER — SURGERY (OUTPATIENT)
Age: 25
End: 2025-04-23
Payer: COMMERCIAL

## 2025-04-23 ENCOUNTER — ANESTHESIA (OUTPATIENT)
Dept: OBSTETRICS AND GYNECOLOGY | Facility: HOSPITAL | Age: 25
End: 2025-04-23
Payer: COMMERCIAL

## 2025-04-23 ENCOUNTER — ANESTHESIA EVENT (OUTPATIENT)
Dept: OBSTETRICS AND GYNECOLOGY | Facility: HOSPITAL | Age: 25
End: 2025-04-23
Payer: COMMERCIAL

## 2025-04-23 LAB
ABO GROUP (TYPE) IN BLOOD: NORMAL
ANTIBODY SCREEN: NORMAL
BASE EXCESS BLDCOA CALC-SCNC: -2.4 MMOL/L (ref -10.8–-0.5)
BASE EXCESS BLDCOV CALC-SCNC: -2.2 MMOL/L (ref -8.1–-0.5)
BODY TEMPERATURE: 37 DEGREES CELSIUS
BODY TEMPERATURE: 37 DEGREES CELSIUS
ERYTHROCYTE [DISTWIDTH] IN BLOOD BY AUTOMATED COUNT: 12.1 % (ref 11.5–14.5)
HCO3 BLDCOA-SCNC: 24.6 MMOL/L (ref 15–29)
HCO3 BLDCOV-SCNC: 23.2 MMOL/L (ref 16–26)
HCT VFR BLD AUTO: 36 % (ref 36–46)
HGB BLD-MCNC: 12.4 G/DL (ref 12–16)
INHALED O2 CONCENTRATION: 21 %
INHALED O2 CONCENTRATION: 21 %
MCH RBC QN AUTO: 31 PG (ref 26–34)
MCHC RBC AUTO-ENTMCNC: 34.4 G/DL (ref 32–36)
MCV RBC AUTO: 90 FL (ref 80–100)
NRBC BLD-RTO: 0 /100 WBCS (ref 0–0)
OXYHGB MFR BLDCOA: 22.8 % (ref 94–98)
OXYHGB MFR BLDCOV: NORMAL %
PCO2 BLDCOA: 50 MM HG (ref 31–75)
PCO2 BLDCOV: 41 MM HG (ref 22–53)
PH BLDCOA: 7.3 PH (ref 7.08–7.39)
PH BLDCOV: 7.36 PH (ref 7.19–7.47)
PLATELET # BLD AUTO: 219 X10*3/UL (ref 150–450)
PO2 BLDCOA: 16 MM HG (ref 5–31)
PO2 BLDCOV: 24 MM HG (ref 13–37)
RBC # BLD AUTO: 4 X10*6/UL (ref 4–5.2)
RH FACTOR (ANTIGEN D): NORMAL
SAO2 % BLDCOA: 23 % (ref 3–69)
SAO2 % BLDCOV: NORMAL %
WBC # BLD AUTO: 15.6 X10*3/UL (ref 4.4–11.3)

## 2025-04-23 PROCEDURE — 86901 BLOOD TYPING SEROLOGIC RH(D): CPT

## 2025-04-23 PROCEDURE — 01961 ANES CESAREAN DELIVERY ONLY: CPT | Performed by: ANESTHESIOLOGIST ASSISTANT

## 2025-04-23 PROCEDURE — 2500000004 HC RX 250 GENERAL PHARMACY W/ HCPCS (ALT 636 FOR OP/ED): Mod: JZ

## 2025-04-23 PROCEDURE — P9045 ALBUMIN (HUMAN), 5%, 250 ML: HCPCS | Mod: JZ | Performed by: ANESTHESIOLOGIST ASSISTANT

## 2025-04-23 PROCEDURE — 2500000005 HC RX 250 GENERAL PHARMACY W/O HCPCS: Performed by: ANESTHESIOLOGIST ASSISTANT

## 2025-04-23 PROCEDURE — 2720000007 HC OR 272 NO HCPCS: Performed by: STUDENT IN AN ORGANIZED HEALTH CARE EDUCATION/TRAINING PROGRAM

## 2025-04-23 PROCEDURE — 2500000001 HC RX 250 WO HCPCS SELF ADMINISTERED DRUGS (ALT 637 FOR MEDICARE OP): Performed by: ANESTHESIOLOGIST ASSISTANT

## 2025-04-23 PROCEDURE — 59514 CESAREAN DELIVERY ONLY: CPT | Performed by: STUDENT IN AN ORGANIZED HEALTH CARE EDUCATION/TRAINING PROGRAM

## 2025-04-23 PROCEDURE — 2500000004 HC RX 250 GENERAL PHARMACY W/ HCPCS (ALT 636 FOR OP/ED): Performed by: ANESTHESIOLOGIST ASSISTANT

## 2025-04-23 PROCEDURE — 01961 ANES CESAREAN DELIVERY ONLY: CPT | Performed by: ANESTHESIOLOGY

## 2025-04-23 PROCEDURE — 3700000014 HC AN EPIDURAL BLOCK CHARGE: Performed by: STUDENT IN AN ORGANIZED HEALTH CARE EDUCATION/TRAINING PROGRAM

## 2025-04-23 PROCEDURE — 1210000001 HC SEMI-PRIVATE ROOM DAILY

## 2025-04-23 PROCEDURE — 2500000001 HC RX 250 WO HCPCS SELF ADMINISTERED DRUGS (ALT 637 FOR MEDICARE OP): Performed by: STUDENT IN AN ORGANIZED HEALTH CARE EDUCATION/TRAINING PROGRAM

## 2025-04-23 PROCEDURE — 85027 COMPLETE CBC AUTOMATED: CPT

## 2025-04-23 PROCEDURE — 36415 COLL VENOUS BLD VENIPUNCTURE: CPT

## 2025-04-23 PROCEDURE — 7100000016 HC LABOR RECOVERY PER HOUR: Performed by: STUDENT IN AN ORGANIZED HEALTH CARE EDUCATION/TRAINING PROGRAM

## 2025-04-23 PROCEDURE — 7100000016 HC LABOR RECOVERY PER HOUR

## 2025-04-23 PROCEDURE — 2500000001 HC RX 250 WO HCPCS SELF ADMINISTERED DRUGS (ALT 637 FOR MEDICARE OP)

## 2025-04-23 PROCEDURE — 88307 TISSUE EXAM BY PATHOLOGIST: CPT | Mod: TC,SUR | Performed by: STUDENT IN AN ORGANIZED HEALTH CARE EDUCATION/TRAINING PROGRAM

## 2025-04-23 PROCEDURE — 82805 BLOOD GASES W/O2 SATURATION: CPT

## 2025-04-23 RX ORDER — ACETAMINOPHEN 325 MG/1
975 TABLET ORAL EVERY 6 HOURS
Status: DISCONTINUED | OUTPATIENT
Start: 2025-04-23 | End: 2025-04-26 | Stop reason: HOSPADM

## 2025-04-23 RX ORDER — CEFAZOLIN 1 G/1
INJECTION, POWDER, FOR SOLUTION INTRAVENOUS AS NEEDED
Status: DISCONTINUED | OUTPATIENT
Start: 2025-04-23 | End: 2025-04-23

## 2025-04-23 RX ORDER — PHENYLEPHRINE 10 MG/250 ML(40 MCG/ML)IN 0.9 % SOD.CHLORIDE INTRAVENOUS
CONTINUOUS PRN
Status: DISCONTINUED | OUTPATIENT
Start: 2025-04-23 | End: 2025-04-23

## 2025-04-23 RX ORDER — NORETHINDRONE AND ETHINYL ESTRADIOL 0.5-0.035
KIT ORAL AS NEEDED
Status: DISCONTINUED | OUTPATIENT
Start: 2025-04-23 | End: 2025-04-23

## 2025-04-23 RX ORDER — ACETAMINOPHEN 120 MG/1
SUPPOSITORY RECTAL AS NEEDED
Status: DISCONTINUED | OUTPATIENT
Start: 2025-04-23 | End: 2025-04-23

## 2025-04-23 RX ORDER — AZITHROMYCIN MONOHYDRATE 500 MG/5ML
INJECTION, POWDER, LYOPHILIZED, FOR SOLUTION INTRAVENOUS AS NEEDED
Status: DISCONTINUED | OUTPATIENT
Start: 2025-04-23 | End: 2025-04-23

## 2025-04-23 RX ORDER — IBUPROFEN 600 MG/1
600 TABLET ORAL EVERY 6 HOURS
Status: DISCONTINUED | OUTPATIENT
Start: 2025-04-24 | End: 2025-04-23

## 2025-04-23 RX ORDER — KETOROLAC TROMETHAMINE 30 MG/ML
INJECTION, SOLUTION INTRAMUSCULAR; INTRAVENOUS AS NEEDED
Status: DISCONTINUED | OUTPATIENT
Start: 2025-04-23 | End: 2025-04-23

## 2025-04-23 RX ORDER — BUPIVACAINE HYDROCHLORIDE 7.5 MG/ML
INJECTION INTRAVENOUS AS NEEDED
Status: DISCONTINUED | OUTPATIENT
Start: 2025-04-23 | End: 2025-04-23

## 2025-04-23 RX ORDER — OXYCODONE HYDROCHLORIDE 5 MG/1
5 TABLET ORAL EVERY 4 HOURS PRN
Status: DISCONTINUED | OUTPATIENT
Start: 2025-04-24 | End: 2025-04-26 | Stop reason: HOSPADM

## 2025-04-23 RX ORDER — PHENYLEPHRINE HCL IN 0.9% NACL 1 MG/10 ML
SYRINGE (ML) INTRAVENOUS AS NEEDED
Status: DISCONTINUED | OUTPATIENT
Start: 2025-04-23 | End: 2025-04-23

## 2025-04-23 RX ORDER — IBUPROFEN 600 MG/1
600 TABLET ORAL EVERY 6 HOURS SCHEDULED
Status: DISCONTINUED | OUTPATIENT
Start: 2025-04-24 | End: 2025-04-26 | Stop reason: HOSPADM

## 2025-04-23 RX ORDER — MORPHINE SULFATE 0.5 MG/ML
INJECTION, SOLUTION EPIDURAL; INTRATHECAL; INTRAVENOUS AS NEEDED
Status: DISCONTINUED | OUTPATIENT
Start: 2025-04-23 | End: 2025-04-23

## 2025-04-23 RX ORDER — OXYCODONE HYDROCHLORIDE 5 MG/1
10 TABLET ORAL EVERY 4 HOURS PRN
Status: DISCONTINUED | OUTPATIENT
Start: 2025-04-24 | End: 2025-04-26 | Stop reason: HOSPADM

## 2025-04-23 RX ORDER — KETOROLAC TROMETHAMINE 30 MG/ML
30 INJECTION, SOLUTION INTRAMUSCULAR; INTRAVENOUS EVERY 6 HOURS
Status: DISCONTINUED | OUTPATIENT
Start: 2025-04-23 | End: 2025-04-23

## 2025-04-23 RX ORDER — ALBUMIN HUMAN 50 G/1000ML
SOLUTION INTRAVENOUS AS NEEDED
Status: DISCONTINUED | OUTPATIENT
Start: 2025-04-23 | End: 2025-04-23

## 2025-04-23 RX ORDER — FAMOTIDINE 10 MG/ML
INJECTION INTRAVENOUS AS NEEDED
Status: DISCONTINUED | OUTPATIENT
Start: 2025-04-23 | End: 2025-04-23

## 2025-04-23 RX ORDER — HYDROXYZINE HYDROCHLORIDE 25 MG/1
25 TABLET, FILM COATED ORAL EVERY 6 HOURS PRN
Status: DISCONTINUED | OUTPATIENT
Start: 2025-04-23 | End: 2025-04-26 | Stop reason: HOSPADM

## 2025-04-23 RX ORDER — HYDROMORPHONE HYDROCHLORIDE 0.2 MG/ML
0.2 INJECTION INTRAMUSCULAR; INTRAVENOUS; SUBCUTANEOUS EVERY 5 MIN PRN
Status: DISCONTINUED | OUTPATIENT
Start: 2025-04-23 | End: 2025-04-26 | Stop reason: HOSPADM

## 2025-04-23 RX ADMIN — OXYTOCIN 600 MILLI-UNITS/MIN: 10 INJECTION, SOLUTION INTRAMUSCULAR; INTRAVENOUS at 11:53

## 2025-04-23 RX ADMIN — ONDANSETRON 4 MG: 2 INJECTION, SOLUTION INTRAMUSCULAR; INTRAVENOUS at 11:07

## 2025-04-23 RX ADMIN — FAMOTIDINE 20 MG: 20 TABLET ORAL at 20:49

## 2025-04-23 RX ADMIN — KETOROLAC TROMETHAMINE 30 MG: 30 INJECTION, SOLUTION INTRAMUSCULAR; INTRAVENOUS at 12:15

## 2025-04-23 RX ADMIN — SODIUM CHLORIDE, SODIUM LACTATE, POTASSIUM CHLORIDE, AND CALCIUM CHLORIDE: 600; 310; 30; 20 INJECTION, SOLUTION INTRAVENOUS at 11:07

## 2025-04-23 RX ADMIN — KETOROLAC TROMETHAMINE 30 MG: 30 INJECTION, SOLUTION INTRAMUSCULAR; INTRAVENOUS at 18:21

## 2025-04-23 RX ADMIN — ACETAMINOPHEN 650 MG: 120 SUPPOSITORY RECTAL at 12:21

## 2025-04-23 RX ADMIN — BUPIVACAINE HYDROCHLORIDE IN DEXTROSE 2 ML: 7.5 INJECTION, SOLUTION SUBARACHNOID at 11:21

## 2025-04-23 RX ADMIN — HYDROXYZINE HYDROCHLORIDE 25 MG: 25 TABLET, FILM COATED ORAL at 17:20

## 2025-04-23 RX ADMIN — FAMOTIDINE 20 MG: 10 INJECTION, SOLUTION INTRAVENOUS at 11:07

## 2025-04-23 RX ADMIN — HYDROMORPHONE HYDROCHLORIDE 0.2 MG: 0.2 INJECTION, SOLUTION INTRAMUSCULAR; INTRAVENOUS; SUBCUTANEOUS at 13:56

## 2025-04-23 RX ADMIN — AZITHROMYCIN 500 MG: 500 INJECTION, POWDER, LYOPHILIZED, FOR SOLUTION INTRAVENOUS at 11:42

## 2025-04-23 RX ADMIN — HYDROMORPHONE HYDROCHLORIDE 0.2 MG: 0.2 INJECTION, SOLUTION INTRAMUSCULAR; INTRAVENOUS; SUBCUTANEOUS at 16:13

## 2025-04-23 RX ADMIN — DEXAMETHASONE SODIUM PHOSPHATE 4 MG: 4 INJECTION, SOLUTION INTRAMUSCULAR; INTRAVENOUS at 11:37

## 2025-04-23 RX ADMIN — CEFAZOLIN 2 G: 1 INJECTION, POWDER, FOR SOLUTION INTRAMUSCULAR; INTRAVENOUS at 11:43

## 2025-04-23 RX ADMIN — Medication 120 MCG: at 12:05

## 2025-04-23 RX ADMIN — PHENYLEPHRINE-NACL IV SOLUTION 10 MG/250ML-0.9% 0.55 MCG/KG/MIN: 10-0.9/25 SOLUTION at 11:22

## 2025-04-23 RX ADMIN — SODIUM CHLORIDE, SODIUM LACTATE, POTASSIUM CHLORIDE, AND CALCIUM CHLORIDE: 600; 310; 30; 20 INJECTION, SOLUTION INTRAVENOUS at 11:53

## 2025-04-23 RX ADMIN — HYDROMORPHONE HYDROCHLORIDE 0.2 MG: 0.2 INJECTION, SOLUTION INTRAMUSCULAR; INTRAVENOUS; SUBCUTANEOUS at 15:08

## 2025-04-23 RX ADMIN — ACETAMINOPHEN 975 MG: 325 TABLET, FILM COATED ORAL at 18:21

## 2025-04-23 RX ADMIN — ALBUMIN HUMAN 250 ML: 0.05 INJECTION, SOLUTION INTRAVENOUS at 12:17

## 2025-04-23 RX ADMIN — EPHEDRINE SULFATE 25 MG: 50 INJECTION, SOLUTION INTRAVENOUS at 12:21

## 2025-04-23 RX ADMIN — MORPHINE SULFATE 3 MG: 0.5 INJECTION EPIDURAL; INTRATHECAL; INTRAVENOUS at 12:24

## 2025-04-23 RX ADMIN — ALBUMIN HUMAN 250 ML: 0.05 INJECTION, SOLUTION INTRAVENOUS at 12:07

## 2025-04-23 SDOH — HEALTH STABILITY: MENTAL HEALTH: CURRENT SMOKER: 0

## 2025-04-23 ASSESSMENT — PAIN SCALES - GENERAL
PAIN_LEVEL: 0
PAINLEVEL_OUTOF10: 6
PAINLEVEL_OUTOF10: 0 - NO PAIN
PAINLEVEL_OUTOF10: 3
PAINLEVEL_OUTOF10: 0 - NO PAIN
PAINLEVEL_OUTOF10: 3
PAINLEVEL_OUTOF10: 0 - NO PAIN
PAINLEVEL_OUTOF10: 2

## 2025-04-23 ASSESSMENT — PAIN DESCRIPTION - DESCRIPTORS
DESCRIPTORS: SORE;BURNING
DESCRIPTORS: DISCOMFORT;CRAMPING;PRESSURE
DESCRIPTORS: DISCOMFORT;DULL;SORE

## 2025-04-23 ASSESSMENT — PAIN - FUNCTIONAL ASSESSMENT
PAIN_FUNCTIONAL_ASSESSMENT: 0-10

## 2025-04-23 NOTE — PROGRESS NOTES
"ANTEPARTUM PROGRESS NOTE   25    SUBJECTIVE: Patient sent to L&D for ~4 min decel on NST this morning. Denies cramping or abdominal pain. Denies fever or chills. Denies VB or change in discharge. Normal fetal movement.     OBJECTIVE:   /80   Pulse 100   Temp 37.6 °C (99.7 °F)   Resp 18   Ht 1.727 m (5' 8\")   Wt 91.3 kg (201 lb 6.2 oz)   LMP 2024   SpO2 98%   BMI 30.62 kg/m²    Temp  Min: 36.4 °C (97.5 °F)  Max: 37.6 °C (99.7 °F)  Pulse  Min: 86  Max: 105  BP  Min: 107/71  Max: 126/80    General: No acute distress  Cardiovascular: Warm and well perfused  Respiratory: Normal respiratory effort  Abdominal:  Soft, gravid, nontender to palpation  Extremities: Warm, well perfused    NST: Baseline 135 / mod variability / pos accels / ~4 min prolonged decel  [place]: acontractile       ASSESSMENT AND PLAN:     24 y.o.  at 33w5d by ultrasound at 19w3d with PPROM.      PPROM  - Hx of oligo since first formal US on  and again on , JIM 4.2 cm. Amniosure positive at OSH prior to transfer. Speculum exam neg x3 on admission to [facility]. Actim Prom negative on . Given high suspicion for PRROM, planning to continue management as such, patient now reports continued leakage of fluid  - S/p latency antibiotics completed   - S/p BMZ -, rBMZ 3/16-  - S/p NICU consult   - S/p genetic counseling due to early oligohydramnios - MaterniT Genome screening performed and negative for tested conditions  - Planning to move towards delivery in the setting of prolonged decel this AM, for pCS for breech presentation at NPO time    Fetal Status  Fetal Growth Restriction  - NST non-reactive this AM  - continue daily NSTs while admitted   - Last growth Ultrasound:  EFW 1326g (6%), AC 6%, normal dopplers   - breech , cont weekly BPPs, for BPP/dopplers today     Routine   - GBS negative, 2/10. Recollected 3/16, pos.  - BCM: POPs   - 1 hr gtt 99   - TDAP 25    Dispo: Planning for delivery today " in the setting of prolonged decel and 33w5d. Patient to stay on L&D for continuous fetal monitoring.     Discussed with MATY Juarez Fellow    To be seen and discussed w/ Dr. Radha Brooks MD PGY-1  Maternal Fetal Medicine, Pager [phone]

## 2025-04-23 NOTE — CARE PLAN
The patient's goals for the shift include get some rest    The clinical goals for the shift include pt will have no s/sx of infection    Over the shift, the patient did not make progress toward the following goals. Barriers to progression include ***. Recommendations to address these barriers include ***.

## 2025-04-23 NOTE — PROGRESS NOTES
Music Therapy Note    Tatianna Mojica     Therapy Session  Referral Type: New referral this admission  Visit Type: New visit  Session Start Time: 1539  Session End Time: 1556  Intervention Delivery: In-person  Conflict of Service: None  Number of family members present: 1  Family Present for Session: Friend/Caregiver  Family Participation: Supportive     Pre-assessment  Unable to Assess Reason: Outcomes not applicable  Mood/Affect: Appropriate, Calm, Cooperative         Treatment/Interventions  Music Therapy Interventions: Assessment  Interruption: No  Patient Fell Asleep at End of Session: No    Post-assessment  Unable to Assess Reason: Did not provide expressive therapy intervention  Mood/Affect: Appropriate, Calm, Cooperative  Continue Visiting: No  Total Session Time (min): 17 minutes    Narrative  Assessment Detail: Patient presented awake and alert, in bed with HOB raised, displaying calm affect. MT introduced self and role and pt was receptive to music therapy education. Patient shared that they do not play an instrument. MT explained how various interventions can be utilized for parental and child wellbeing while inpatient at [facility], including offering of interventions for coping and family bonding such as lullaby writing, guided breathing and meditation, active and passive music engagement. Patient shared that her partner writes music and plays bass and may be interested in lullaby writing; pt reqeusted this MT follow on Tuesdays and Fridays; pt planning to rest this day.  Follow-up: MT to continue to follow as schedules allow.    Education Documentation  No documentation found.

## 2025-04-23 NOTE — CARE PLAN
Problem: Pain - Adult  Goal: Verbalizes/displays adequate comfort level or baseline comfort level  Outcome: Progressing     Problem: Safety - Adult  Goal: Free from fall injury  Outcome: Progressing     Problem: Postpartum  Goal: Experiences normal postpartum course  Outcome: Progressing  Goal: Incisions, wounds, or drain sites healing without S/S of infection  Outcome: Progressing  Goal: No s/sx of hemorrhage  Outcome: Progressing     Problem: Discharge Planning  Goal: Discharge to home or other facility with appropriate resources  Outcome: Progressing   The patient's goals for the shift include pain control    The clinical goals for the shift include Patient will meet PP milestones    Patient currently meeting PP milestones, patient has been free from s/sx of infection or hemorrhage. Patient has been free from injury. Pain adequately controlled with prescribed medication regimen.

## 2025-04-23 NOTE — ANESTHESIA PREPROCEDURE EVALUATION
Patient: Tatianna Mojica    Evaluation Method: In-person visit    Procedure Information       Date: 25    Procedure:  DELIVERY - prim c/s @34.1 wks  pprom  breech  FGR  Dr. Mitchell    Location: [facility]    Surgeons: Joseline Verma MD            Relevant Problems   GYN   (+) 30 weeks gestation of pregnancy ([facility])      Gravid and    (+)  premature rupture of membranes (PPROM) with unknown onset of labor ([facility])       Clinical information reviewed:    Allergies  Meds  Problems              NPO Detail:  No data recorded     OB/GYN     Physical Exam    Airway  Mallampati: II  TM distance: >3 FB  Neck ROM: full  Mouth openin finger widths     Cardiovascular    Dental    Pulmonary    Abdominal            Anesthesia Plan    History of general anesthesia?: no  History of complications of general anesthesia?: unknown/emergency    ASA 2     CSE     The patient is not a current smoker.    Anesthetic plan and risks discussed with patient.  Use of blood products discussed with patient who consented to blood products.    Plan discussed with CAA and attending.

## 2025-04-23 NOTE — PROGRESS NOTES
Music Therapy Note    Tatianna Mojica     Therapy Session  Referral Type: New referral this admission  Visit Type: Follow-up visit  Session Start Time: 1722  Session End Time: 1724  Intervention Delivery: In-person  Conflict of Service: Declined treatment  Number of family members present: 1  Family Present for Session: Friend/Caregiver  Family Participation: Supportive     Pre-assessment  Unable to Assess Reason: Outcomes not applicable  Mood/Affect: Appropriate, Calm, Cooperative         Treatment/Interventions  Music Therapy Interventions: Assessment  Interruption: No  Patient Fell Asleep at End of Session: No    Post-assessment  Unable to Assess Reason: Did not provide expressive therapy intervention  Mood/Affect: Appropriate, Calm, Cooperative  Continue Visiting: No  Total Session Time (min): 2 minutes    Narrative  Assessment Detail: Patient presented awake and alert, supine in bed, displaying lethargic affect. Patient declined music intervention at this time, expressing gratitude for MT's attempt.  Follow-up: MT to continue to follow as schedule allows.    Education Documentation  No documentation found.

## 2025-04-23 NOTE — ANESTHESIA PROCEDURE NOTES
CSE Block    Patient location during procedure: OR  Start time: 4/23/2025 11:11 AM  End time: 4/23/2025 11:20 AM  Reason for block: primary anesthetic}  Staffing  Performed: ANJALI and BRENDA   Authorized by: Bhaskar Gee DO    Performed by: ANJALI Sanchez    Preanesthetic Checklist  Completed: patient identified, IV checked, risks and benefits discussed, surgical consent, monitors and equipment checked, pre-op evaluation, timeout performed and sterile techniques followed  Block Timeout  RN/Licensed healthcare professional reads aloud to the Anesthesia provider and entire team: Patient identity, procedure with side and site, patient position, and as applicable the availability of implants/special equipment/special requirements.  Patient on coagulant treatment: no  Timeout performed at: 4/23/2025 11:12 AM    CSE Block  Patient position: sitting  Prep: ChloraPrep  Sterility prep: mask, hand, gloves, drape and cap  Sedation level: no sedation  Patient monitoring: blood pressure, continuous pulse oximetry and heart rate  Approach: midline  Local numbing: lidocaine 1% to skin and subcutaneous tissues  Vertebral space: lumbar  L3-4  Guidance: landmark technique    Epidural Needle  BARB technique: saline  Needle type: Tuohy   Needle gauge: 17 G  Needle length: 8.9 cm  Needle insertion depth: 6 cm  Spinal Needle  Needle type: pencil-point   Needle gauge: 25 G  Needle length: 12.7 cm  Free flow CSF: yes  Epidural Catheter  Catheter type: multi-orifice  Catheter size: 18 G  Catheter at skin depth: 11 cm  Catheter securement method: clear occlusive dressing and liquid medical adhesive              Assessment  Sensory level: T4 bilateral  Block outcome: Allis test negative  Number of attempts: 1  Procedure assessment: patient tolerated procedure well with no immediate complications

## 2025-04-23 NOTE — L&D DELIVERY NOTE
Birth Operative Report    Patient Name: Tatianna Mojica  : 2000  MRN: [id number]  Age: 24 y.o.    /Para:   Gestational Age: 33w5d    Date of Surgery: 2025    Operating Room Location: [place] [address]    Pre-op Diagnosis:  Intrauterine Pregnancy at 33w5d  PPROM  Breech malpresentation    Post-op Diagnosis:  Same    Procedure:   Primary Low Transverse  Section    [facility] Time: Confirmed Non-scheduled, Non-urgent    Surgeon:    * Joseline Verma - Primary    Resident/Fellow/Other Assistant: MD Jair  Surgeons and Role:  * No surgeons found with a matching role *    Anesthesia:  Type: CSE     Anesthesiologist: Bhaskar Gee DO  C-AA: ANJALI Sanchez    Surgical Staff:  Circulator: Laney King RN  Scrub Person: Zunilda Shaw     Preoperative Antibiotics: Ancef 2 g and Azithromycin 500 mg    Indication for Procedure:   24 y.o.  at 33w5d PPROM with breech malpresentation and non-reassuring fetal tracing on AM NST.     Informed Consent:  The risks, benefits, complications, and alternatives were discussed with the patient. The patient understood that the risks of  section include but are not limited to infection, bleeding, injury to nearby structures or organs, possible need for transfusion, and potential need for more surgery. The patient stated understanding and desired to proceed. All questions were answered. The site of surgery was properly noted and marked. The patient's identity was confirmed, and the procedure verified as a  delivery. A Time Out was held and the above information confirmed.     Findings:   Normal appearing gravid uterus, fallopian tubes, and ovaries. Amniotic fluid Clear;Yellow, Female infant in       breech presentation, APGARS 8 , 9 .  Birth Weight 1.94 kg.    Description of Procedure:   Patient was taken to the OR where regional anesthesia was found to be adequate.  She was then placed in the dorsal supine  position with a left lateral tilt. A camara catheter was placed, SCDs were applied, and a vaginal prep was performed. A pre-procedure time out was performed. The patient was given a prophylactic dose of IV antibiotics.  She was then prepped and draped in the usual sterile fashion.     A Pfannenstiel skin incision was made with the scalpel through the skin and subcutaneous fat to the underlying fascial layer. The fascia was incised in the midline with the scalpel and the incision was extended bluntly bilaterally. The rectus muscle were divided in the midline, the peritoneum was then identified and entered bluntly and incision extended superiorly and inferiorly taking care to avoid underlying viscera.  The bladder blade was inserted.       Uterine Incision was made with the scalpel, the uterine cavity was entered, and the hysterotomy was extended cephalocaudally by stretching. The infant was delivered atraumatically, the cord was clamped and cut and infant was handed off to awaiting nursing.  A cord segment and blood sample were collected. The placenta was then expressed. The uterus was cleared of all clot and debris. The uterine incision was repaired using a running locked stitch of 0-Vicryl in two layers. Adequate hemostasis was noted.    The hysterotomy was again evaluated and found to be hemostatic, the underside of the fascia and bladder and the rectus muscles were also found to be hemostatic. The fascia was closed using a running stitch of 0-PDS.  The L lateral aspect of the  fascial closure was noted to have a thin layer not included in the stitch. This was reinforced with a 3-0 Vicryl running stitch. The subcutaneous layer was irrigated, small bleeders were cauterized. The subcutaneous layer was re-approximated using a running stitch of 2-0 Monocryl. The skin was closed with 4-0 Monocryl.         * Joseline Verma - Primary was present for key portions of the procedure.    Joseline Verma MD    Complications:            Anesthesia Record               Intraprocedure I/O Totals          Intake    LR bolus 1000.00 mL    Oxytocin Drip 0.00 mL    The total shown is the total volume documented since Anesthesia Start was filed.    Phenylephrine Drip 0.00 mL    The total shown is the total volume documented since Anesthesia Start was filed.    Total Intake 1000 mL       Output    Urine 125 mL    Total Blood Loss - Surgical Delivery (mL) 316 mL    Total Output 441 mL       Net    Net Volume 559 mL            Blood products: none   Blood Product Administration History       None            Uterotonics/Hemostatic Agent: IV Pitocin 30 units    Specimen:   Placenta  Delivered: 2025 11:55 AM  Appearance:    Removal:      Disposition: pathology    Sponge/Instrument/Needle Counts: The sponge, lap and needle counts were correct.    Patient Disposition: Patient recovering on labor and delivery in stable condition.    Additional Procedures:  None    Task Performed by: RN or PA Surgical Assistant: N/A      Ghazala Mojica [[id number]]      Labor Events    Rupture type: Prelabor,  Prelabor  Fluid color: Clear, Yellow  Fluid odor: None  Labor type:  Without Labor  Labor allowed to proceed with plans for an attempted vaginal birth?: No       Placenta    Placenta delivery date/time: 2025 11:55  Placenta removal:   Placenta disposition: pathology       Cord    Vessels: 3 vessels  Delayed cord clamping?: Yes  Cord clamped date/time: 2025 11:53:00  Gases sent?: Yes       Anesthesia    Method: Epidural       Operative Delivery    Forceps attempted?: No  Vacuum extractor attempted?: No       Shoulder Dystocia    Shoulder dystocia present?: No        Delivery    Birth date/time: 2025 11:53:00  Delivery type:        Resuscitation    Method: Suctioning, Tactile stimulation       Apgars    Living status: Living  Apgar Component Scores:  1 min.:  5 min.:  10 min.:  15 min.:  20 min.:    Skin color:  1  1        Heart rate:  2  2       Reflex irritability:  1  2       Muscle tone:  2  2       Respiratory effort:  2  2       Total:  8  9       Apgars assigned by: TASHA LEBRON MD       Delivery Providers    Delivering clinician: Joseline Verma MD   Provider Role    Laney King, RN Delivery Nurse    Nicole Hernandez RN Nursery Nurse    Miguel Adam MD Delivery Assist

## 2025-04-24 LAB
ALBUMIN SERPL BCP-MCNC: 3.2 G/DL (ref 3.4–5)
ALP SERPL-CCNC: 68 U/L (ref 33–110)
ALT SERPL W P-5'-P-CCNC: 9 U/L (ref 7–45)
ANION GAP SERPL CALC-SCNC: 12 MMOL/L (ref 10–20)
AST SERPL W P-5'-P-CCNC: 17 U/L (ref 9–39)
BILIRUB SERPL-MCNC: 0.3 MG/DL (ref 0–1.2)
BUN SERPL-MCNC: 9 MG/DL (ref 6–23)
CALCIUM SERPL-MCNC: 8.7 MG/DL (ref 8.6–10.6)
CHLORIDE SERPL-SCNC: 105 MMOL/L (ref 98–107)
CO2 SERPL-SCNC: 23 MMOL/L (ref 21–32)
CREAT SERPL-MCNC: 0.51 MG/DL (ref 0.5–1.05)
EGFRCR SERPLBLD CKD-EPI 2021: >90 ML/MIN/1.73M*2
ERYTHROCYTE [DISTWIDTH] IN BLOOD BY AUTOMATED COUNT: 12.6 % (ref 11.5–14.5)
GLUCOSE SERPL-MCNC: 94 MG/DL (ref 74–99)
HCT VFR BLD AUTO: 28.5 % (ref 36–46)
HGB BLD-MCNC: 9.7 G/DL (ref 12–16)
MCH RBC QN AUTO: 31.4 PG (ref 26–34)
MCHC RBC AUTO-ENTMCNC: 34 G/DL (ref 32–36)
MCV RBC AUTO: 92 FL (ref 80–100)
NRBC BLD-RTO: 0 /100 WBCS (ref 0–0)
PLATELET # BLD AUTO: 161 X10*3/UL (ref 150–450)
POTASSIUM SERPL-SCNC: 3.1 MMOL/L (ref 3.5–5.3)
PROT SERPL-MCNC: 5.8 G/DL (ref 6.4–8.2)
RBC # BLD AUTO: 3.09 X10*6/UL (ref 4–5.2)
SODIUM SERPL-SCNC: 137 MMOL/L (ref 136–145)
WBC # BLD AUTO: 12.2 X10*3/UL (ref 4.4–11.3)

## 2025-04-24 PROCEDURE — 36415 COLL VENOUS BLD VENIPUNCTURE: CPT

## 2025-04-24 PROCEDURE — 1210000001 HC SEMI-PRIVATE ROOM DAILY

## 2025-04-24 PROCEDURE — 85027 COMPLETE CBC AUTOMATED: CPT | Performed by: HOSPITALIST

## 2025-04-24 PROCEDURE — 80053 COMPREHEN METABOLIC PANEL: CPT

## 2025-04-24 PROCEDURE — 36415 COLL VENOUS BLD VENIPUNCTURE: CPT | Performed by: HOSPITALIST

## 2025-04-24 PROCEDURE — 2500000001 HC RX 250 WO HCPCS SELF ADMINISTERED DRUGS (ALT 637 FOR MEDICARE OP)

## 2025-04-24 RX ORDER — DIPHENHYDRAMINE HYDROCHLORIDE 50 MG/ML
25 INJECTION, SOLUTION INTRAMUSCULAR; INTRAVENOUS EVERY 4 HOURS PRN
Status: DISCONTINUED | OUTPATIENT
Start: 2025-04-24 | End: 2025-04-26 | Stop reason: HOSPADM

## 2025-04-24 RX ORDER — HYDRALAZINE HYDROCHLORIDE 20 MG/ML
5 INJECTION INTRAMUSCULAR; INTRAVENOUS ONCE AS NEEDED
Status: DISCONTINUED | OUTPATIENT
Start: 2025-04-24 | End: 2025-04-26 | Stop reason: HOSPADM

## 2025-04-24 RX ORDER — DIPHENHYDRAMINE HCL 25 MG
25 CAPSULE ORAL EVERY 4 HOURS PRN
Status: DISCONTINUED | OUTPATIENT
Start: 2025-04-24 | End: 2025-04-26 | Stop reason: HOSPADM

## 2025-04-24 RX ORDER — SIMETHICONE 80 MG
80 TABLET,CHEWABLE ORAL 4 TIMES DAILY PRN
Status: DISCONTINUED | OUTPATIENT
Start: 2025-04-24 | End: 2025-04-26 | Stop reason: HOSPADM

## 2025-04-24 RX ORDER — NALOXONE HYDROCHLORIDE 0.4 MG/ML
0.1 INJECTION, SOLUTION INTRAMUSCULAR; INTRAVENOUS; SUBCUTANEOUS EVERY 5 MIN PRN
Status: DISCONTINUED | OUTPATIENT
Start: 2025-04-24 | End: 2025-04-26 | Stop reason: HOSPADM

## 2025-04-24 RX ORDER — ONDANSETRON HYDROCHLORIDE 2 MG/ML
4 INJECTION, SOLUTION INTRAVENOUS EVERY 6 HOURS PRN
Status: DISCONTINUED | OUTPATIENT
Start: 2025-04-24 | End: 2025-04-26 | Stop reason: HOSPADM

## 2025-04-24 RX ORDER — MISOPROSTOL 200 UG/1
800 TABLET ORAL ONCE AS NEEDED
Status: DISCONTINUED | OUTPATIENT
Start: 2025-04-24 | End: 2025-04-26 | Stop reason: HOSPADM

## 2025-04-24 RX ORDER — ONDANSETRON 4 MG/1
4 TABLET, FILM COATED ORAL EVERY 6 HOURS PRN
Status: DISCONTINUED | OUTPATIENT
Start: 2025-04-24 | End: 2025-04-26 | Stop reason: HOSPADM

## 2025-04-24 RX ORDER — ADHESIVE BANDAGE
10 BANDAGE TOPICAL
Status: DISCONTINUED | OUTPATIENT
Start: 2025-04-24 | End: 2025-04-26 | Stop reason: HOSPADM

## 2025-04-24 RX ORDER — METHYLERGONOVINE MALEATE 0.2 MG/ML
0.2 INJECTION INTRAVENOUS ONCE AS NEEDED
Status: DISCONTINUED | OUTPATIENT
Start: 2025-04-24 | End: 2025-04-26 | Stop reason: HOSPADM

## 2025-04-24 RX ORDER — LABETALOL HYDROCHLORIDE 5 MG/ML
20 INJECTION, SOLUTION INTRAVENOUS ONCE AS NEEDED
Status: DISCONTINUED | OUTPATIENT
Start: 2025-04-24 | End: 2025-04-26 | Stop reason: HOSPADM

## 2025-04-24 RX ORDER — LOPERAMIDE HYDROCHLORIDE 2 MG/1
4 CAPSULE ORAL EVERY 2 HOUR PRN
Status: DISCONTINUED | OUTPATIENT
Start: 2025-04-24 | End: 2025-04-26 | Stop reason: HOSPADM

## 2025-04-24 RX ORDER — LIDOCAINE 560 MG/1
1 PATCH PERCUTANEOUS; TOPICAL; TRANSDERMAL
Status: DISCONTINUED | OUTPATIENT
Start: 2025-04-24 | End: 2025-04-26 | Stop reason: HOSPADM

## 2025-04-24 RX ORDER — POLYETHYLENE GLYCOL 3350 17 G/17G
17 POWDER, FOR SOLUTION ORAL 2 TIMES DAILY PRN
Status: DISCONTINUED | OUTPATIENT
Start: 2025-04-24 | End: 2025-04-26 | Stop reason: HOSPADM

## 2025-04-24 RX ORDER — CARBOPROST TROMETHAMINE 250 UG/ML
250 INJECTION, SOLUTION INTRAMUSCULAR ONCE AS NEEDED
Status: DISCONTINUED | OUTPATIENT
Start: 2025-04-24 | End: 2025-04-26 | Stop reason: HOSPADM

## 2025-04-24 RX ORDER — OXYTOCIN 10 [USP'U]/ML
10 INJECTION, SOLUTION INTRAMUSCULAR; INTRAVENOUS ONCE AS NEEDED
Status: DISCONTINUED | OUTPATIENT
Start: 2025-04-24 | End: 2025-04-26 | Stop reason: HOSPADM

## 2025-04-24 RX ORDER — OXYTOCIN/0.9 % SODIUM CHLORIDE 30/500 ML
60 PLASTIC BAG, INJECTION (ML) INTRAVENOUS ONCE AS NEEDED
Status: DISCONTINUED | OUTPATIENT
Start: 2025-04-24 | End: 2025-04-26 | Stop reason: HOSPADM

## 2025-04-24 RX ORDER — TRANEXAMIC ACID 100 MG/ML
1000 INJECTION, SOLUTION INTRAVENOUS ONCE AS NEEDED
Status: DISCONTINUED | OUTPATIENT
Start: 2025-04-24 | End: 2025-04-26 | Stop reason: HOSPADM

## 2025-04-24 RX ADMIN — IBUPROFEN 600 MG: 600 TABLET ORAL at 12:32

## 2025-04-24 RX ADMIN — IBUPROFEN 600 MG: 600 TABLET ORAL at 18:34

## 2025-04-24 RX ADMIN — OXYCODONE 5 MG: 5 TABLET ORAL at 14:18

## 2025-04-24 RX ADMIN — SIMETHICONE 80 MG: 80 TABLET, CHEWABLE ORAL at 08:17

## 2025-04-24 RX ADMIN — IBUPROFEN 600 MG: 600 TABLET ORAL at 06:30

## 2025-04-24 RX ADMIN — ACETAMINOPHEN 975 MG: 325 TABLET, FILM COATED ORAL at 18:34

## 2025-04-24 RX ADMIN — ACETAMINOPHEN 975 MG: 325 TABLET, FILM COATED ORAL at 06:30

## 2025-04-24 RX ADMIN — ACETAMINOPHEN 975 MG: 325 TABLET, FILM COATED ORAL at 00:12

## 2025-04-24 RX ADMIN — FAMOTIDINE 20 MG: 20 TABLET ORAL at 20:13

## 2025-04-24 RX ADMIN — ACETAMINOPHEN 975 MG: 325 TABLET, FILM COATED ORAL at 12:31

## 2025-04-24 RX ADMIN — SIMETHICONE 80 MG: 80 TABLET, CHEWABLE ORAL at 18:42

## 2025-04-24 RX ADMIN — PRENATAL VIT W/ FE FUMARATE-FA TAB 27-0.8 MG 1 TABLET: 27-0.8 TAB at 08:16

## 2025-04-24 RX ADMIN — IBUPROFEN 600 MG: 600 TABLET ORAL at 00:12

## 2025-04-24 RX ADMIN — FAMOTIDINE 20 MG: 20 TABLET ORAL at 08:17

## 2025-04-24 ASSESSMENT — PAIN DESCRIPTION - LOCATION
LOCATION: ABDOMEN

## 2025-04-24 ASSESSMENT — PAIN SCALES - GENERAL
PAINLEVEL_OUTOF10: 3
PAINLEVEL_OUTOF10: 2
PAIN_LEVEL: 0
PAINLEVEL_OUTOF10: 4
PAINLEVEL_OUTOF10: 0 - NO PAIN
PAINLEVEL_OUTOF10: 7
PAINLEVEL_OUTOF10: 3
PAINLEVEL_OUTOF10: 5 - MODERATE PAIN
PAINLEVEL_OUTOF10: 4

## 2025-04-24 ASSESSMENT — PAIN DESCRIPTION - DESCRIPTORS
DESCRIPTORS: SORE
DESCRIPTORS: SORE
DESCRIPTORS: ACHING
DESCRIPTORS: ACHING;SORE

## 2025-04-24 ASSESSMENT — PAIN - FUNCTIONAL ASSESSMENT
PAIN_FUNCTIONAL_ASSESSMENT: 0-10

## 2025-04-24 NOTE — CARE PLAN
Problem: Pain - Adult  Goal: Verbalizes/displays adequate comfort level or baseline comfort level  2025 1644 by Irene Hull RN  Outcome: Progressing  2025 1643 by Irene Hull RN  Outcome: Progressing     Problem: Safety - Adult  Goal: Free from fall injury  2025 1644 by Irene Hull RN  Outcome: Progressing  2025 1643 by Irene Hull RN  Outcome: Progressing     Problem: Postpartum  Goal: Experiences normal postpartum course  2025 1644 by Irene Hull RN  Outcome: Progressing  2025 1643 by Irene Hull RN  Outcome: Progressing  Goal: Appropriate maternal -  bonding  2025 1644 by Irene Hull RN  Outcome: Progressing  2025 1643 by Irene Hull RN  Outcome: Progressing  Goal: Incisions, wounds, or drain sites healing without S/S of infection  2025 1644 by Irene Hull RN  Outcome: Progressing  2025 1643 by Irene Hull RN  Outcome: Progressing  Goal: No s/sx of hemorrhage  2025 1644 by Irene Hull RN  Outcome: Progressing  2025 1643 by Irene Hull RN  Outcome: Progressing     Problem: Discharge Planning  Goal: Discharge to home or other facility with appropriate resources  2025 1644 by Irene Hull RN  Outcome: Progressing  2025 1643 by Irene Hull RN  Outcome: Progressing   The patient's goals for the shift include pump ,then go see baby.    The clinical goals for the shift include Pt. will be able to void a second time s/p camara removed.    VSS and pt. Is eating,voiding,walking and passing gas.She is using breast pump and visited baby today.She is taking tylenol,motrin,oxycodone and simethicone for her pain.She also likes heat packs.Stable.

## 2025-04-24 NOTE — ANESTHESIA POSTPROCEDURE EVALUATION
Patient: Tatianna Mojica    Procedure Summary       Date: 25 Room / Location: [facility] OB 01 / Virtual [facility] 2 OB    Anesthesia Start: 1107 Anesthesia Stop: 1236    Procedure:  DELIVERY Diagnosis:       History of  premature rupture of membranes (PPROM)      Breech presentation, fetus 1 of multiple gestation ([facility])      Fetal growth restriction antepartum ([facility]-HCC)      (History of  premature rupture of membranes (PPROM) [Z87.59])      (Breech presentation, fetus 1 of multiple gestation ([facility]-HCC) [O32.1XX1])      (Fetal growth restriction antepartum ([facility]HS-HCC) [O36.5990])    Surgeons: Joseline Verma MD Responsible Provider: Bhaskar Gee DO    Anesthesia Type: CSE ASA Status: 2            Anesthesia Type: CSE    Vitals Value Taken Time   /65 25 14:36   Temp 36.6 °C (97.9 °F) 25 12:32   Pulse 92 25 15:11   Resp 18 25 12:32   SpO2 94 % 25 15:11       Anesthesia Post Evaluation    Patient location during evaluation: PACU  Patient participation: complete - patient participated  Level of consciousness: awake  Pain score: 0  Pain management: adequate  Airway patency: patent  Cardiovascular status: acceptable  Respiratory status: acceptable  Hydration status: acceptable  Postoperative Nausea and Vomiting: none  Comments: Epidural sight accessed. No drainage, redness or swelling. Patient ambulating without issue. No N/V, able to void. No signs of PDPH. Taking oral intake appropriately. Pain well controlled.        No notable events documented.

## 2025-04-24 NOTE — LACTATION NOTE
This note was copied from a baby's chart.  Lactation Consultant Note  Lactation Consultation  Reason for Consult: Initial assessment, NICU baby  Consultant Name: Sylvia Linares RN IBCLC    Recommendations/Summary       I was called to pt's bedside to measure mom for flange fit.  Her right breast measures at 15 mm and her left breast measures at 14 mm.  Mom was given a pair of 18 mm flanges to use with pumping.  She was instructed to look on line and order inserts if she would like to have a better fitting flange.  Mom had no further  questions or concerns.  Invited to contact LC services as needed.

## 2025-04-24 NOTE — PROGRESS NOTES
Tatianna Mojica is a 24 y.o. female on day 78 of admission presenting with  premature rupture of membranes (PPROM) with unknown onset of labor ([facility]).    Joser met with Ms. Mojica at bedside this PM as she delivered on 25. Also present was her fiance, Misael, but he was asleep. She reports doing okay but just in pain s/p C/S. She is excited that her daughter is here and had a chance to go see her in the NICU. SWer discussed that the NICU SWer will come by and speak with her and Misael at some point to review NICU specific things. Briefly discussed [facility] program, boarding, and [facility]. Will defer to NICU SWer to further discuss.    Denies any immediate needs. SWer will plan to follow up with Ms. Mojica on 25 as planned to discuss writing a letter to present to her employer re: 12wks rest s/p C/S.    Social Work will continue to remain available for any questions or concerns. Please feel free to reach out.     2025   CHASE Marrero  OB/GYN   Office Phone: [phone]  Secure chat preferred

## 2025-04-24 NOTE — LACTATION NOTE
This note was copied from a baby's chart.  Lactation Consultant Note  Lactation Consultation  Reason for Consult: Initial assessment, NICU baby  Consultant Name: Sylvia Linares, RN IBCLC    Maternal Information  Has mother  before?: No  Infant to breast within first 2 hours of birth?: No  Breastfeeding Delayed Due to: Infant status  Exclusive Pump and Bottle Feed: No  WIC Program: No    Maternal Assessment       Infant Assessment       Feeding Assessment  Nutrition Source: Breastmilk, Donor human milk  Feeding Method: Tube feeding    LATCH TOOL       Breast Pump  Pump: Hospital grade electric pump  Frequency: 8-10 times per day  Duration: Initiate phase  Breast Shield Size and Type: 21 mm  Units of Volume: Drops    Other OB Lactation Tools       Patient Follow-up       Other OB Lactation Documentation  Maternal Risk Factors:  delivery <37 weeks,  delivery  Infant Risk Factors: Prematurity <37 weeks, Low birth weight <2500 g    Recommendations/Summary       I spoke with mom at pt's bedside to explained availability of the [facility] LC services.  Instructed on listed patient education: DAVID, Benefits of mother's own milk for the infant, breast massage and hand expression,CDC pump cleaning & sanitizing guidelines.  Mom reports that she has pumped several times since delivery.  She is now collecting small amounts of colostrum. I discussed with mom the need to use a properly fitting flange with pumping.  She is holding the baby now and was instructed to have the baby's nurse call me when she puts the baby back to bed so that I can measure her for flange fit. Mom does not have a pump for home use and is currently without insurance.  Once mom's insurance gets sorted out, we can order her a pump.  Mom was instructed on the sam program. She had no further questions at this time. Invited to contact LC services as needed.

## 2025-04-24 NOTE — PROGRESS NOTES
Postpartum Progress Note    Assessment/Plan   Tatianna Mojica is a 24 y.o., , who delivered at 33w5d gestation via , Low Transverse in s/o PPROM and breech position with nonreassuring fetal tracing. Patient admitted on  and has been managed inpatient by MFM 2/2 oligohydraminos vs ROM presentation.     Now PPD#1 s/p , Low Transverse on 2025, EBL 354ml  - POD1 CBC pending   - Continue routine postpartum care  - Pain well controlled on po medications  - DVT risk score DVT Score (IF A SCORE IS NOT CALCULATING, MUST SELECT A BMI TO COMPLETE): 3 , ppx with SCDs and ambulation  - RH positive, rhogam not indicated  - Hgb:   Results from last 7 days   Lab Units 25  0611   HEMOGLOBIN g/dL 12.4       Elevated BP, no dx gHTN/PEC  - mild range BPs < 4hrs apart and severe range BP x1  - asymptomatic   - baseline HELLP labs pending   - continue to monitor, if further mild range BP will meet criteria for gHTN, if further severe range will meet criteria for sPEC    Maternal Well-Being  - Vitals stable  - All questions and concerns address    [place] Feeding  - Breastfeeding/pumping encouraged  - Lactation consult prn    Contraception  - POPs  - Education provided    Dispo  - Anticipate d/c on PPD #3 if meeting all postpartum milestones  - Follow-up in 1-2wks for incision check  - Follow-up in 4-6wks with primary SELMA Purcell PA-C     Assessment & Plan   premature rupture of membranes (PPROM) with unknown onset of labor ([facility])    Oligohydramnios antepartum, second trimester, not applicable or unspecified fetus ([facility])    30 weeks gestation of pregnancy ([facility])    Poor fetal growth affecting management of mother in second trimester ([facility])    History of  premature rupture of membranes (PPROM)    Breech presentation, no version ([facility])    Fetal growth restriction antepartum ([facility])    Pregnancy Problems (from 25 to present)       Problem Noted Diagnosed  Resolved    Poor fetal growth affecting management of mother in second trimester ([facility]) 3/28/2025 by Michael Sargent MD  No    Priority:  Medium        premature rupture of membranes (PPROM) with unknown onset of labor ([facility]) 2025 by Michael Sargent MD  No    Priority:  Medium       30 weeks gestation of pregnancy ([facility]) 2025 by Rema Arora MD  No    Priority:  Medium       Oligohydramnios antepartum, second trimester, not applicable or unspecified fetus ([facility]) 2025 by Shelby Gallardo MD  No    Priority:  Medium       Oligohydramnios in second trimester ([facility]) 2025 by BRADLEY Simpson-CNP  2025 by Rema Arora MD    Priority:  Medium       Overview Signed 2025  2:07 PM by Varsha Royal APRN-CNP   : JIM 3  : JIM 4, triage visit, SSE neg x 3, vaginal cultures pending  Needs to re-schedule genetics appt  Plan repeat US in 1-2 wks                 Subjective     Tatianna Mojica is PPD#1 s/p  who reports feeling overall well.    Patient evaluated today at bedside while pumping. She reports no immediate complaints or concerns.     Her pain is well controlled with current medications  She is passing flatus  She is ambulating well  She is tolerating a Adult diet Regular  She reports no breast or nursing problems  She denies emotional concerns today      Denies dizziness/lightheadedness, SOB, palpitations, extreme fatigue, heavy bleeding   Denies HA, SOB, RUQ pain, vision changes.      Objective   Allergies:   Patient has no known allergies.         Last Vitals:  Temp Pulse Resp BP MAP Pulse Ox   36.9 °C (98.4 °F) 89 18 95/62   97 %     Vitals Min/Max Last 24 Hours:  Temp  Min: 36.5 °C (97.7 °F)  Max: 36.9 °C (98.4 °F)  Pulse  Min: 84  Max: 100  Resp  Min: 16  Max: 20  BP  Min: 95/62  Max: 165/70    Intake/Output:     Intake/Output Summary (Last 24 hours) at 2025 1350  Last data filed at 2025 2200  Gross per 24 hour   Intake 151 ml   Output 375 ml    Net -224 ml       Physical Exam:  General: Examination reveals a well developed, well nourished, female, in no acute distress. She is alert and cooperative.  Lungs: symmetrical, non-labored breathing.  Cardiac: warm, well-perfused.  Abdomen: soft, non-tender.  Incision: Well approximated, without erythema/edema/drainage  Fundus: firm, below umbilicus, appropriately tender  Extremities: no redness or tenderness in the calves or thighs.  Neurological: alert, oriented, normal speech, no focal findings or movement disorder noted.     Lab Data:  Lab Results   Component Value Date    WBC 15.6 (H) 04/23/2025    HGB 12.4 04/23/2025    HCT 36.0 04/23/2025     04/23/2025

## 2025-04-24 NOTE — CARE PLAN
The patient's goals for the shift include to get some rest    The clinical goals for the shift include Pt will meet PP milestones    Over the shift, the patient did not make progress toward the following goals. Barriers to progression include ***. Recommendations to address these barriers include ***.

## 2025-04-25 PROCEDURE — 2500000001 HC RX 250 WO HCPCS SELF ADMINISTERED DRUGS (ALT 637 FOR MEDICARE OP)

## 2025-04-25 PROCEDURE — 2500000001 HC RX 250 WO HCPCS SELF ADMINISTERED DRUGS (ALT 637 FOR MEDICARE OP): Performed by: HOSPITALIST

## 2025-04-25 PROCEDURE — 1210000001 HC SEMI-PRIVATE ROOM DAILY

## 2025-04-25 RX ORDER — POTASSIUM CHLORIDE 1.5 G/1.58G
40 POWDER, FOR SOLUTION ORAL ONCE
Status: COMPLETED | OUTPATIENT
Start: 2025-04-25 | End: 2025-04-25

## 2025-04-25 RX ADMIN — OXYCODONE 5 MG: 5 TABLET ORAL at 00:31

## 2025-04-25 RX ADMIN — SIMETHICONE 80 MG: 80 TABLET, CHEWABLE ORAL at 08:36

## 2025-04-25 RX ADMIN — FAMOTIDINE 20 MG: 20 TABLET ORAL at 08:19

## 2025-04-25 RX ADMIN — PRENATAL VIT W/ FE FUMARATE-FA TAB 27-0.8 MG 1 TABLET: 27-0.8 TAB at 08:19

## 2025-04-25 RX ADMIN — ACETAMINOPHEN 975 MG: 325 TABLET, FILM COATED ORAL at 12:10

## 2025-04-25 RX ADMIN — IBUPROFEN 600 MG: 600 TABLET ORAL at 18:12

## 2025-04-25 RX ADMIN — ACETAMINOPHEN 975 MG: 325 TABLET, FILM COATED ORAL at 06:45

## 2025-04-25 RX ADMIN — FAMOTIDINE 20 MG: 20 TABLET ORAL at 21:35

## 2025-04-25 RX ADMIN — IBUPROFEN 600 MG: 600 TABLET ORAL at 12:10

## 2025-04-25 RX ADMIN — ACETAMINOPHEN 975 MG: 325 TABLET, FILM COATED ORAL at 00:31

## 2025-04-25 RX ADMIN — OXYCODONE 5 MG: 5 TABLET ORAL at 06:46

## 2025-04-25 RX ADMIN — IBUPROFEN 600 MG: 600 TABLET ORAL at 06:46

## 2025-04-25 RX ADMIN — POTASSIUM CHLORIDE 40 MEQ: 1.5 POWDER, FOR SOLUTION ORAL at 18:12

## 2025-04-25 RX ADMIN — ACETAMINOPHEN 975 MG: 325 TABLET, FILM COATED ORAL at 18:11

## 2025-04-25 RX ADMIN — IBUPROFEN 600 MG: 600 TABLET ORAL at 00:31

## 2025-04-25 ASSESSMENT — PAIN DESCRIPTION - DESCRIPTORS
DESCRIPTORS: ACHING;SHARP
DESCRIPTORS: DISCOMFORT
DESCRIPTORS: ACHING;SORE

## 2025-04-25 ASSESSMENT — PAIN - FUNCTIONAL ASSESSMENT
PAIN_FUNCTIONAL_ASSESSMENT: 0-10
PAIN_FUNCTIONAL_ASSESSMENT: 0-10

## 2025-04-25 ASSESSMENT — PAIN SCALES - GENERAL
PAINLEVEL_OUTOF10: 4
PAINLEVEL_OUTOF10: 4
PAINLEVEL_OUTOF10: 6
PAINLEVEL_OUTOF10: 4
PAINLEVEL_OUTOF10: 4
PAINLEVEL_OUTOF10: 6
PAINLEVEL_OUTOF10: 4

## 2025-04-25 NOTE — CARE PLAN
VSS t/o shift. Incision, fundus, and bleeding WDL. Pt ambulating, voiding, passing flatus/BM's, pumping, and visits NICU. Pain well managed on current regimen.       Problem: Pain - Adult  Goal: Verbalizes/displays adequate comfort level or baseline comfort level  Outcome: Progressing     Problem: Safety - Adult  Goal: Free from fall injury  Outcome: Progressing     Problem: Postpartum  Goal: Experiences normal postpartum course  Outcome: Progressing  Goal: Appropriate maternal -  bonding  Outcome: Progressing  Goal: Incisions, wounds, or drain sites healing without S/S of infection  Outcome: Progressing  Goal: No s/sx of hemorrhage  Outcome: Progressing     Problem: Discharge Planning  Goal: Discharge to home or other facility with appropriate resources  Outcome: Progressing

## 2025-04-25 NOTE — PROGRESS NOTES
Postpartum Progress Note    Assessment/Plan   Tatianna Mojica is a 24 y.o., , who delivered at 33w5d gestation via , Low Transverse in s/o PPROM and breech position with nonreassuring fetal tracing. Patient admitted on  and has been managed inpatient by MFM 2/2 oligohydraminos vs ROM presentation.     Now PPD#2 s/p , Low Transverse on 2025, EBL 354ml  - POD1 CBC pending   - Continue routine postpartum care  - Pain well controlled on po medications  - DVT risk score DVT Score (IF A SCORE IS NOT CALCULATING, MUST SELECT A BMI TO COMPLETE): 3 , ppx with SCDs and ambulation  - RH positive, rhogam not indicated  - Hgb:   Results from last 7 days   Lab Units 25  1633 25  0611   HEMOGLOBIN g/dL 9.7* 12.4     Acute Blood Loss Anemia  -   - hgb trend as above   - asymptomatic   - VSS   - PO Fe sent at DC     Elevated BP, no dx gHTN/PEC  - mild range BPs < 4hrs apart and severe range BP x1  - asymptomatic   - HELLP labs negative  - continue to monitor, if further mild range BP will meet criteria for gHTN, if further severe range will meet criteria for sPEC    Hypokalemia  - 40 mEq K+ replaced   - follow-up BMP in AM    Maternal Well-Being  - Vitals stable  - All questions and concerns address     Feeding  - Breastfeeding/pumping encouraged  - Lactation consult prn    Contraception  - POPs  - Education provided    Dispo  - Anticipate d/c on PPD #3 if meeting all postpartum milestones  - Follow-up in 1-2wks for incision check  - Follow-up in 4-6wks with primary SELMA Purcell PA-C     Assessment & Plan   premature rupture of membranes (PPROM) with unknown onset of labor ([facility])    Oligohydramnios antepartum, second trimester, not applicable or unspecified fetus ([facility])    30 weeks gestation of pregnancy ([facility])    Poor fetal growth affecting management of mother in second trimester ([facility])    History of  premature rupture of membranes  (PPROM)    Breech presentation, no version ([facility])    Fetal growth restriction antepartum ([facility])    Pregnancy Problems (from 25 to present)       Problem Noted Diagnosed Resolved    Poor fetal growth affecting management of mother in second trimester ([facility]) 3/28/2025 by Michael Sargent MD  No    Priority:  Medium        premature rupture of membranes (PPROM) with unknown onset of labor ([facility]) 2025 by Michael Sargent MD  No    Priority:  Medium       30 weeks gestation of pregnancy ([facility]) 2025 by Rema Arora MD  No    Priority:  Medium       Oligohydramnios antepartum, second trimester, not applicable or unspecified fetus ([facility]) 2025 by Shelby Gallardo MD  No    Priority:  Medium       Oligohydramnios in second trimester ([facility]) 2025 by BRADLEY Simpson-CNP  2025 by Rema Arora MD    Priority:  Medium       Overview Signed 2025  2:07 PM by Varsha Royal APRN-CNP   : JIM 3  : JIM 4, triage visit, SSE neg x 3, vaginal cultures pending  Needs to re-schedule genetics appt  Plan repeat US in 1-2 wks                 Subjective     Tatianna Mojica is PPD#2 s/p  who reports feeling overall well.    Patient evaluated today at bedside and reports doing good. Has no immediate concerns or complaints.   Denies dizziness/lightheadedness, SOB, palpitations, extreme fatigue, heavy bleeding   Denies HA, SOB, RUQ pain, vision changes.    Her pain is well controlled with current medications  She is passing flatus  She is ambulating well  She is tolerating a Adult diet Regular  She reports no breast or nursing problems  She denies emotional concerns today         Objective   Allergies:   Patient has no known allergies.         Last Vitals:  Temp Pulse Resp BP MAP Pulse Ox   36.4 °C (97.5 °F) 90 18 105/71   96 %     Vitals Min/Max Last 24 Hours:  Temp  Min: 36.4 °C (97.5 °F)  Max: 36.5 °C (97.7 °F)  Pulse  Min: 79  Max: 97  Resp  Min: 18  Max: 18  BP  Min:  102/67  Max: 115/77    Intake/Output:     Intake/Output Summary (Last 24 hours) at 4/25/2025 1533  Last data filed at 4/25/2025 0355  Gross per 24 hour   Intake --   Output 800 ml   Net -800 ml       Physical Exam:  General: Examination reveals a well developed, well nourished, female, in no acute distress. She is alert and cooperative.  Lungs: symmetrical, non-labored breathing.  Cardiac: warm, well-perfused.  Abdomen: soft, non-tender.  Incision: Well approximated, without erythema/edema/drainage  Fundus: firm, below umbilicus, appropriately tender  Extremities: no redness or tenderness in the calves or thighs.  Neurological: alert, oriented, normal speech, no focal findings or movement disorder noted.     Lab Data:  Lab Results   Component Value Date    WBC 12.2 (H) 04/24/2025    HGB 9.7 (L) 04/24/2025    HCT 28.5 (L) 04/24/2025     04/24/2025

## 2025-04-26 ENCOUNTER — PHARMACY VISIT (OUTPATIENT)
Dept: PHARMACY | Facility: CLINIC | Age: 25
End: 2025-04-26
Payer: COMMERCIAL

## 2025-04-26 VITALS
SYSTOLIC BLOOD PRESSURE: 122 MMHG | OXYGEN SATURATION: 98 % | HEART RATE: 75 BPM | HEIGHT: 68 IN | WEIGHT: 201.39 LBS | TEMPERATURE: 96.8 F | DIASTOLIC BLOOD PRESSURE: 75 MMHG | RESPIRATION RATE: 16 BRPM | BODY MASS INDEX: 30.52 KG/M2

## 2025-04-26 PROBLEM — R03.0 ELEVATED BP WITHOUT DIAGNOSIS OF HYPERTENSION: Status: ACTIVE | Noted: 2025-04-26

## 2025-04-26 LAB
ANION GAP SERPL CALC-SCNC: 9 MMOL/L (ref 10–20)
BUN SERPL-MCNC: 13 MG/DL (ref 6–23)
CALCIUM SERPL-MCNC: 8 MG/DL (ref 8.6–10.6)
CHLORIDE SERPL-SCNC: 108 MMOL/L (ref 98–107)
CO2 SERPL-SCNC: 28 MMOL/L (ref 21–32)
CREAT SERPL-MCNC: 0.63 MG/DL (ref 0.5–1.05)
EGFRCR SERPLBLD CKD-EPI 2021: >90 ML/MIN/1.73M*2
GLUCOSE SERPL-MCNC: 99 MG/DL (ref 74–99)
POTASSIUM SERPL-SCNC: 4 MMOL/L (ref 3.5–5.3)
SODIUM SERPL-SCNC: 141 MMOL/L (ref 136–145)

## 2025-04-26 PROCEDURE — 2500000001 HC RX 250 WO HCPCS SELF ADMINISTERED DRUGS (ALT 637 FOR MEDICARE OP)

## 2025-04-26 PROCEDURE — 2500000004 HC RX 250 GENERAL PHARMACY W/ HCPCS (ALT 636 FOR OP/ED)

## 2025-04-26 PROCEDURE — 99239 HOSP IP/OBS DSCHRG MGMT >30: CPT

## 2025-04-26 PROCEDURE — RXMED WILLOW AMBULATORY MEDICATION CHARGE

## 2025-04-26 PROCEDURE — 80048 BASIC METABOLIC PNL TOTAL CA: CPT | Performed by: HOSPITALIST

## 2025-04-26 PROCEDURE — 36415 COLL VENOUS BLD VENIPUNCTURE: CPT | Performed by: HOSPITALIST

## 2025-04-26 RX ORDER — NALOXONE HYDROCHLORIDE 4 MG/.1ML
1 SPRAY NASAL AS NEEDED
Qty: 2 EACH | Refills: 0 | Status: SHIPPED | OUTPATIENT
Start: 2025-04-26

## 2025-04-26 RX ORDER — POLYETHYLENE GLYCOL 3350 17 G/17G
17 POWDER, FOR SOLUTION ORAL 2 TIMES DAILY PRN
Qty: 238 G | Refills: 0 | Status: SHIPPED | OUTPATIENT
Start: 2025-04-26

## 2025-04-26 RX ORDER — CETIRIZINE HYDROCHLORIDE 10 MG/1
10 TABLET ORAL ONCE
Status: COMPLETED | OUTPATIENT
Start: 2025-04-26 | End: 2025-04-26

## 2025-04-26 RX ORDER — ACETAMINOPHEN 325 MG/1
975 TABLET ORAL EVERY 6 HOURS PRN
Qty: 120 TABLET | Refills: 0 | Status: SHIPPED | OUTPATIENT
Start: 2025-04-26

## 2025-04-26 RX ORDER — IBUPROFEN 600 MG/1
600 TABLET ORAL EVERY 6 HOURS PRN
Qty: 60 TABLET | Refills: 0 | Status: SHIPPED | OUTPATIENT
Start: 2025-04-26

## 2025-04-26 RX ORDER — FERROUS SULFATE 325(65) MG
325 TABLET ORAL EVERY OTHER DAY
Qty: 30 TABLET | Refills: 0 | Status: SHIPPED | OUTPATIENT
Start: 2025-04-26 | End: 2025-06-25

## 2025-04-26 RX ORDER — OXYCODONE HYDROCHLORIDE 5 MG/1
5 TABLET ORAL EVERY 4 HOURS PRN
Qty: 10 TABLET | Refills: 0 | Status: SHIPPED | OUTPATIENT
Start: 2025-04-26

## 2025-04-26 RX ORDER — NORETHINDRONE 0.35 MG/1
1 TABLET ORAL DAILY
Qty: 84 TABLET | Refills: 3 | Status: SHIPPED | OUTPATIENT
Start: 2025-04-26 | End: 2026-04-26

## 2025-04-26 RX ADMIN — IBUPROFEN 600 MG: 600 TABLET ORAL at 00:30

## 2025-04-26 RX ADMIN — OXYCODONE 5 MG: 5 TABLET ORAL at 00:30

## 2025-04-26 RX ADMIN — ONDANSETRON HYDROCHLORIDE 4 MG: 4 TABLET, FILM COATED ORAL at 08:35

## 2025-04-26 RX ADMIN — ACETAMINOPHEN 975 MG: 325 TABLET, FILM COATED ORAL at 00:30

## 2025-04-26 RX ADMIN — IBUPROFEN 600 MG: 600 TABLET ORAL at 06:48

## 2025-04-26 RX ADMIN — CETIRIZINE HYDROCHLORIDE 10 MG: 10 TABLET, FILM COATED ORAL at 11:39

## 2025-04-26 RX ADMIN — ACETAMINOPHEN 975 MG: 325 TABLET, FILM COATED ORAL at 14:44

## 2025-04-26 RX ADMIN — FAMOTIDINE 20 MG: 20 TABLET ORAL at 08:35

## 2025-04-26 RX ADMIN — IBUPROFEN 600 MG: 600 TABLET ORAL at 14:44

## 2025-04-26 RX ADMIN — PRENATAL VIT W/ FE FUMARATE-FA TAB 27-0.8 MG 1 TABLET: 27-0.8 TAB at 08:35

## 2025-04-26 RX ADMIN — OXYCODONE 5 MG: 5 TABLET ORAL at 11:39

## 2025-04-26 RX ADMIN — ACETAMINOPHEN 975 MG: 325 TABLET, FILM COATED ORAL at 06:48

## 2025-04-26 ASSESSMENT — PAIN SCALES - GENERAL
PAINLEVEL_OUTOF10: 5 - MODERATE PAIN
PAINLEVEL_OUTOF10: 4
PAINLEVEL_OUTOF10: 6
PAINLEVEL_OUTOF10: 7
PAINLEVEL_OUTOF10: 5 - MODERATE PAIN

## 2025-04-26 ASSESSMENT — PAIN - FUNCTIONAL ASSESSMENT
PAIN_FUNCTIONAL_ASSESSMENT: 0-10

## 2025-04-26 ASSESSMENT — PAIN DESCRIPTION - DESCRIPTORS
DESCRIPTORS: SORE;BURNING
DESCRIPTORS: SORE;BURNING

## 2025-04-26 NOTE — LACTATION NOTE
Lactation Consultant Note    Recommendations/Summary  Spoke with Patient regarding pumping for baby in the NICu.  Mom stated she has started to obtain 5 ML at a pumping session for the baby and is excited about this.     She stated she is having insurance issues that need to be worked out on her end with Medicaid.   NICU lactation did review option of Pierre pump with her and she voiced she may want to do this once the baby is getting discharged home, but, she plans to stay here as a border patient for until baby discharged (if this is an option for her    Reviewed agin the requirement for the PIERRE rental for the 25$ deposit cash or credit card.    Encouraged her to continue to pump every 3 hours for 15 minutes on both breasts and to send any pumped breast milk to the NICU for the baby.     Denies any further questions at this time.,

## 2025-04-26 NOTE — CARE PLAN
VSS t/o shift. Incision, fundus, and bleeding WDL. Pt ambulating, voiding, passing flatus/BM's, pumping, and visits NICU. Pain well managed on current regimen. Pt discharged and provided instructions.       Problem: Pain - Adult  Goal: Verbalizes/displays adequate comfort level or baseline comfort level  Outcome: Met     Problem: Safety - Adult  Goal: Free from fall injury  Outcome: Met     Problem: Postpartum  Goal: Experiences normal postpartum course  Outcome: Met  Goal: Appropriate maternal -  bonding  Outcome: Met  Goal: Incisions, wounds, or drain sites healing without S/S of infection  Outcome: Met  Goal: No s/sx of hemorrhage  Outcome: Met     Problem: Discharge Planning  Goal: Discharge to home or other facility with appropriate resources  Outcome: Met

## 2025-04-26 NOTE — DISCHARGE SUMMARY
Discharge Summary    Tatianna Mojica is a 24 y.o. year old female , who delivered at 33w5d gestation via , Low Transverse in s/o PPROM and breech position with nonreassuring fetal tracing. Patient admitted on  and has been managed inpatient by MFM 2/2 oligohydraminos vs ROM presentation.     Admission Date: 2025  Discharge Date: 25       Discharge Diagnosis  , Low Transverse    Hospital Course  Delivery Date: 2025 11:53 AM  Delivery type: , Low Transverse   GA at delivery: 33w5d   Outcome: Living  Intrapartum complications: None  Feeding method: Breastfeeding Status: Yes     Procedures: none  Contraception at discharge: POPs. We discussed pregnancy spacing of at least one year, abstaining from intercourse for 6wks, and the ability to become pregnant in the absence of regular menses. Pt verbalized understanding.    Acute Blood Loss Anemia  - hgb 12.4-> -> 9.7  - asymptomatic   - VSS   - PO Fe sent at DC      Elevated BP, no dx gHTN/PEC  - mild range BPs < 4hrs apart and severe range BP x1  - asymptomatic, nausea this AM resolved with anti-emetic, tolerating POs, had a BM last night (diarrhea)  - HELLP labs negative  - continue to monitor, if further mild range BP will meet criteria for gHTN, if further severe range will meet criteria for sPEC     Hypokalemia  - K+ 3.1-> 40 mEq -> 4.0    Maternal Well-being  - Patient tearful about going home without her baby  - Discussed that patient has been in the hospital since early February, and it would help her adjustment to go home prior to going home with her infant  - Offered OB Behavioral Health referral, declines at this time  - Connected with NICU SW and support group    Abdominal Rash  - Macular erythematous rash, non-pruritic- Likely reaction to chloraprep since rash is more concentrated in that area, does NOT extend to incision  - Recommend shower (patient has not showered since surgery) [facility]  [facility]    Meeting all postpartum milestones. OK for DC today and follow up as below.   - Follow-up in 1-2wks for incision check  - Follow-up in 4-6wks with primary OGYN    Pertinent Physical Exam At Time of Discharge    General: Examination reveals a well developed, well nourished, female, in no acute distress. She is alert and cooperative.  Lungs: symmetrical, non-labored breathing.  Cardiac: warm, well-perfused.  Abdomen: soft, non-tender. Macular erythematous rash, non-pruritic, scattered throughout abdomen, NOT extending to incision.  Incision: Well approximated, without erythema/edema/drainage, resolving ecchymosis surrounding incision  Fundus: firm, below umbilicus, appropriately tender  Extremities: no redness or tenderness in the calves or thighs.  Neurological: alert, oriented, normal speech, no focal findings or movement disorder noted.     Vitals  /85   Pulse 91   Temp 37 °C (98.6 °F) (Temporal)   Resp 16   Ht 1.727 m (5' 8\")   Wt 91.3 kg (201 lb 6.2 oz)   LMP 09/16/2024   SpO2 97%   Breastfeeding Yes   BMI 30.62 kg/m²      Discharge Meds     Your medication list        START taking these medications        Instructions Last Dose Given Next Dose Due   acetaminophen 325 mg tablet  Commonly known as: Tylenol      Take 3 tablets (975 mg) by mouth every 6 hours if needed for mild pain (1 - 3) or moderate pain (4 - 6).       ferrous sulfate 325 mg (65 mg elemental) tablet  Commonly known as: Iron (ferrous sulfate)      Take 1 tablet (325 mg) by mouth every other day for 30 doses. Take on an empty stomach with vitamin C supplement or vitamin C containing juice       ibuprofen 600 mg tablet      Take 1 tablet (600 mg) by mouth every 6 hours if needed for mild pain (1 - 3) or moderate pain (4 - 6).       naloxone 4 mg/0.1 mL nasal spray  Commonly known as: Narcan      Administer 1 spray (4 mg) into affected nostril(s) if needed for opioid reversal or respiratory depression. May repeat every 2-3 " minutes if needed, alternating nostrils, until medical assistance becomes available.       norethindrone 0.35 mg tablet  Commonly known as: Micronor      Take 1 tablet (0.35 mg) over 28 days by mouth once daily.       oxyCODONE 5 mg immediate release tablet  Commonly known as: Roxicodone      Take 1 tablet (5 mg) by mouth every 4 hours if needed for severe pain (7 - 10).       polyethylene glycol 17 gram packet  Commonly known as: Glycolax, Miralax      Take 17 g by mouth 2 times a day as needed (constipation).              CONTINUE taking these medications        Instructions Last Dose Given Next Dose Due   M- Plus 27 mg iron- 1 mg tablet  Generic drug: prenatal vitamin calcium-iron-folic                     Where to Get Your Medications        These medications were sent to [facility]/pharmacy #[id number] - [place], [place] - [address] [address]      Phone: [phone]   norethindrone 0.35 mg tablet       These medications were sent to [facility]  [address], [address], [place] [id number]      Hours: 8AM to 6PM Mon-Fri, 8AM to 4PM Sat, 9AM to 1PM Sun Phone: [phone]   acetaminophen 325 mg tablet  ferrous sulfate 325 mg (65 mg elemental) tablet  ibuprofen 600 mg tablet  naloxone 4 mg/0.1 mL nasal spray  oxyCODONE 5 mg immediate release tablet  polyethylene glycol 17 gram packet          Complications Requiring Follow-Up  ABLA    Test Results Pending At Discharge  Pending Labs       Order Current Status    POCT urinalysis dipstick In process    Surgical Pathology Exam - PLACENTA In process            Outpatient Follow-Up    I spent 35 minutes in the professional and overall care of this patient.      Malissa Dowling, APRN-CNP

## 2025-04-28 LAB
LABORATORY COMMENT REPORT: NORMAL
PATH REPORT.FINAL DX SPEC: NORMAL
PATH REPORT.GROSS SPEC: NORMAL
PATH REPORT.RELEVANT HX SPEC: NORMAL
PATH REPORT.TOTAL CANCER: NORMAL

## 2025-04-30 ENCOUNTER — LACTATION ENCOUNTER (OUTPATIENT)
Dept: OTHER | Facility: HOSPITAL | Age: 25
End: 2025-04-30

## 2025-04-30 NOTE — LACTATION NOTE
This note was copied from a baby's chart.  Lactation Consultant Note  Lactation Consultation   Leyda Arreguin RN, IBCLC    Recommendations/Summary  Attempted to reach mom by phone to check on her progress pumping and see if she has obtained a pump. Left message with NICU contact information.

## 2025-05-05 ENCOUNTER — LACTATION ENCOUNTER (OUTPATIENT)
Dept: OTHER | Facility: HOSPITAL | Age: 25
End: 2025-05-05

## 2025-05-05 NOTE — LACTATION NOTE
This note was copied from a baby's chart.  Lactation Consultant Note   Breast Pump   Offered  Radha vargas Optio [employer] breastpump for home use while infant remains hospitalized. Terms of rental agreement reviewed. Mom indicates her wearable pump is not that effective for her.   Recommendations/Summary  Met with Mom. She reports she collects ~ 5-7 oz with each pumping effort. Offered assistance with breastfeeding infant. Invited to contact LC services as needed. Invited to NICU lactation support group meeting on 5-6-25 at 2-2-30 pm on site.

## 2025-05-07 ENCOUNTER — CLINICAL SUPPORT (OUTPATIENT)
Dept: OBSTETRICS AND GYNECOLOGY | Facility: HOSPITAL | Age: 25
End: 2025-05-07
Payer: COMMERCIAL

## 2025-05-07 ENCOUNTER — LACTATION ENCOUNTER (OUTPATIENT)
Dept: OTHER | Facility: HOSPITAL | Age: 25
End: 2025-05-07

## 2025-05-07 VITALS
HEART RATE: 109 BPM | DIASTOLIC BLOOD PRESSURE: 82 MMHG | HEIGHT: 69 IN | BODY MASS INDEX: 31.31 KG/M2 | WEIGHT: 211.4 LBS | SYSTOLIC BLOOD PRESSURE: 116 MMHG

## 2025-05-07 PROCEDURE — 99211 OFF/OP EST MAY X REQ PHY/QHP: CPT

## 2025-05-07 NOTE — LACTATION NOTE
This note was copied from a baby's chart.  Lactation Consultant Note  Lactation Consultation   Leyda ArreguinRN, IBCLC    Recommendations/Summary  Met with mother at infant's bedside. Provided with discharge education packet and reviewed it's contents of: outpatient lactation contact information, signs of adequate breastfeeding and causes for concern in the baby, and maternal self care. Mom states that she is comfortable pumping and bottle feeding but hopes to introduce the breast to the baby at home. Encouraged to follow up with outpatient lactation. Mom says that she has only one wearable pump that doesn't work well for her. Informed her that she should attempt to reach out to customer service at [employer] and ask for assistance or a replacement. Also, assisted her with renting a [employer] pump and provided with receipt and information for use and return. Answered any questions.

## 2025-05-07 NOTE — PROGRESS NOTES
Patient here 2 weeks post  section for incision check. Patient denies any constant pain, with the exception of inconsistent sharp pain on the right side. Nidhi Lyons CNM was made aware, spoke to the patient and educated her on the different reasons for this. Patient's incision was well approximated with no redness, drainage, warmth or odor. Patient had all of her questions answered and states that she has her PP visit scheduled. Patient aware of when to contact the office. Patient left clinic with no questions, comments or concerns at this time.    FARIDA Tao

## 2025-05-16 ENCOUNTER — APPOINTMENT (OUTPATIENT)
Dept: OBSTETRICS AND GYNECOLOGY | Facility: CLINIC | Age: 25
End: 2025-05-16
Payer: COMMERCIAL

## 2025-06-19 ENCOUNTER — TELEPHONE (OUTPATIENT)
Dept: CASE MANAGEMENT | Facility: HOSPITAL | Age: 25
End: 2025-06-19
Payer: COMMERCIAL

## 2025-06-19 ENCOUNTER — DOCUMENTATION (OUTPATIENT)
Dept: OBSTETRICS AND GYNECOLOGY | Facility: CLINIC | Age: 25
End: 2025-06-19
Payer: COMMERCIAL

## 2025-06-19 NOTE — TELEPHONE ENCOUNTER
Received phone call from Ms. Mojica asking for a return to work letter clearing her to work. Advised her that SW cannot write that letter as SW is not a medical professional, but to instead reach out to her outpt OB. No further questions or concerns.     Social Work will continue to remain available for any questions or concerns. Please feel free to reach out.     6/19/2025   CHASE Marrero  OB/GYN   Office Phone: [phone]  Secure chat preferred

## 2025-06-19 NOTE — PROGRESS NOTES
Pt calling-csection 4/23.  Requesting RTW letter.   Advised pt she requires a PPV, pt scheduled for 7/1 at 1130 at [facility] per request.  Letter generated, released to [employer] and faxed to pt's employer, [phone], per request.

## 2025-07-01 ENCOUNTER — APPOINTMENT (OUTPATIENT)
Dept: OBSTETRICS AND GYNECOLOGY | Facility: CLINIC | Age: 25
End: 2025-07-01
Payer: COMMERCIAL

## 2025-07-03 ENCOUNTER — APPOINTMENT (OUTPATIENT)
Facility: CLINIC | Age: 25
End: 2025-07-03
Payer: COMMERCIAL

## 2025-07-08 ENCOUNTER — APPOINTMENT (OUTPATIENT)
Dept: OBSTETRICS AND GYNECOLOGY | Facility: CLINIC | Age: 25
End: 2025-07-08
Payer: COMMERCIAL

## (undated) DEVICE — SUTURE, MONOCRYL, 2-0, 36 IN, CTX, VIOLET

## (undated) DEVICE — SUTURE, PDS II, 0, 60 IN, CTX, VIOLET

## (undated) DEVICE — SUTURE, VICRYL, 4-0, 27 IN, PS-1, UNDYED

## (undated) DEVICE — TOWEL PACK, STERILE, 4/PACK, BLUE

## (undated) DEVICE — DRAPE PACK, CESAREAN SECTION, CUSTOM, UHC

## (undated) DEVICE — SUTURE, VICRYL, 0, 36 IN, CT, UNDYED

## (undated) DEVICE — GLOVE, SURGICAL, PROTEXIS PI BLUE W/NEUTHERA, 6.5, PF, LF

## (undated) DEVICE — GLOVE, SURGICAL, PROTEXIS PI MICRO, 6.0, PF, LF